# Patient Record
Sex: FEMALE | Race: WHITE | NOT HISPANIC OR LATINO | Employment: FULL TIME | URBAN - METROPOLITAN AREA
[De-identification: names, ages, dates, MRNs, and addresses within clinical notes are randomized per-mention and may not be internally consistent; named-entity substitution may affect disease eponyms.]

---

## 2023-03-16 PROBLEM — R10.12 ABDOMINAL PAIN, LUQ (LEFT UPPER QUADRANT): Status: ACTIVE | Noted: 2023-03-16

## 2023-03-16 PROBLEM — R94.31 ABNORMAL EKG: Status: ACTIVE | Noted: 2023-03-16

## 2023-03-16 PROBLEM — K43.9 ABDOMINAL WALL HERNIA: Status: ACTIVE | Noted: 2023-03-16

## 2023-03-16 PROBLEM — M79.89 SOFT TISSUE MASS: Status: ACTIVE | Noted: 2023-03-16

## 2023-03-16 PROBLEM — R11.0 FEELING QUEASY: Status: ACTIVE | Noted: 2023-03-16

## 2023-03-16 PROBLEM — J02.9 SORE THROAT: Status: ACTIVE | Noted: 2023-03-16

## 2023-03-16 PROBLEM — M25.512 PAIN IN JOINT OF LEFT SHOULDER: Status: ACTIVE | Noted: 2023-03-16

## 2023-03-16 PROBLEM — R10.9 FLANK PAIN, ACUTE: Status: ACTIVE | Noted: 2023-03-16

## 2023-03-16 PROBLEM — R10.11 ABDOMINAL PAIN, RUQ (RIGHT UPPER QUADRANT): Status: ACTIVE | Noted: 2023-03-16

## 2023-03-16 PROBLEM — R39.89 BLADDER PAIN: Status: ACTIVE | Noted: 2023-03-16

## 2023-03-16 PROBLEM — E11.9 DIABETES MELLITUS TYPE 2 WITHOUT RETINOPATHY (MULTI): Status: ACTIVE | Noted: 2023-03-16

## 2023-03-16 PROBLEM — R31.9 HEMATURIA: Status: ACTIVE | Noted: 2023-03-16

## 2023-03-16 PROBLEM — M79.646 THUMB PAIN: Status: ACTIVE | Noted: 2023-03-16

## 2023-03-16 PROBLEM — K76.0 STEATOSIS OF LIVER: Status: ACTIVE | Noted: 2023-03-16

## 2023-03-16 PROBLEM — R91.1 LUNG NODULE SEEN ON IMAGING STUDY: Status: ACTIVE | Noted: 2023-03-16

## 2023-03-16 PROBLEM — M79.602 LEFT ARM PAIN: Status: ACTIVE | Noted: 2023-03-16

## 2023-03-16 PROBLEM — H61.20 IMPACTED CERUMEN: Status: ACTIVE | Noted: 2023-03-16

## 2023-03-16 PROBLEM — J20.9 ACUTE BRONCHITIS: Status: ACTIVE | Noted: 2023-03-16

## 2023-03-16 PROBLEM — R44.8 FACIAL PRESSURE: Status: ACTIVE | Noted: 2023-03-16

## 2023-03-16 PROBLEM — R19.7 DIARRHEA: Status: ACTIVE | Noted: 2023-03-16

## 2023-03-16 PROBLEM — D30.00 RENAL BENIGN NEOPLASM: Status: ACTIVE | Noted: 2023-03-16

## 2023-03-16 PROBLEM — R10.31 ABDOMINAL PAIN, RLQ (RIGHT LOWER QUADRANT): Status: ACTIVE | Noted: 2023-03-16

## 2023-03-16 PROBLEM — R10.32 ABDOMINAL PAIN, LLQ (LEFT LOWER QUADRANT): Status: ACTIVE | Noted: 2023-03-16

## 2023-03-16 PROBLEM — E78.5 HYPERLIPIDEMIA: Status: ACTIVE | Noted: 2023-03-16

## 2023-03-16 PROBLEM — R53.83 FATIGUE: Status: ACTIVE | Noted: 2023-03-16

## 2023-03-16 PROBLEM — H93.11 TINNITUS OF RIGHT EAR: Status: ACTIVE | Noted: 2023-03-16

## 2023-03-16 PROBLEM — H52.00 HYPEROPIA: Status: ACTIVE | Noted: 2023-03-16

## 2023-03-16 PROBLEM — R14.0 ABDOMINAL BLOATING: Status: ACTIVE | Noted: 2023-03-16

## 2023-03-16 PROBLEM — N81.9 VAGINAL VAULT PROLAPSE: Status: ACTIVE | Noted: 2023-03-16

## 2023-03-16 PROBLEM — E11.9 TYPE 2 DIABETES MELLITUS (MULTI): Status: ACTIVE | Noted: 2023-03-16

## 2023-03-16 PROBLEM — K59.00 CONSTIPATION: Status: ACTIVE | Noted: 2023-03-16

## 2023-03-16 PROBLEM — R68.81 EARLY SATIETY: Status: ACTIVE | Noted: 2023-03-16

## 2023-03-16 PROBLEM — J06.9 URI WITH COUGH AND CONGESTION: Status: ACTIVE | Noted: 2023-03-16

## 2023-03-16 PROBLEM — R05.3 CHRONIC COUGH: Status: ACTIVE | Noted: 2023-03-16

## 2023-03-16 PROBLEM — I10 ESSENTIAL HYPERTENSION: Status: ACTIVE | Noted: 2023-03-16

## 2023-03-16 PROBLEM — N13.30 HYDRONEPHROSIS: Status: ACTIVE | Noted: 2023-03-16

## 2023-03-16 PROBLEM — R10.2 FEMALE PELVIC PAIN: Status: ACTIVE | Noted: 2023-03-16

## 2023-03-16 PROBLEM — L03.90 WOUND CELLULITIS: Status: ACTIVE | Noted: 2023-03-16

## 2023-03-16 PROBLEM — R06.02 SHORTNESS OF BREATH: Status: ACTIVE | Noted: 2023-03-16

## 2023-03-16 PROBLEM — R16.0 HEPATOMEGALY: Status: ACTIVE | Noted: 2023-03-16

## 2023-03-16 PROBLEM — R05.9 COUGH: Status: ACTIVE | Noted: 2023-03-16

## 2023-03-16 PROBLEM — N39.0 UTI (URINARY TRACT INFECTION): Status: ACTIVE | Noted: 2023-03-16

## 2023-03-16 PROBLEM — R09.81 NASAL CONGESTION: Status: ACTIVE | Noted: 2023-03-16

## 2023-03-16 PROBLEM — R35.0 URINARY FREQUENCY: Status: ACTIVE | Noted: 2023-03-16

## 2023-03-16 PROBLEM — R11.10 VOMITING IN ADULT: Status: ACTIVE | Noted: 2023-03-16

## 2023-03-16 PROBLEM — N39.3 FEMALE STRESS INCONTINENCE: Status: ACTIVE | Noted: 2023-03-16

## 2023-03-16 PROBLEM — J30.2 SEASONAL ALLERGIES: Status: ACTIVE | Noted: 2023-03-16

## 2023-03-16 PROBLEM — N81.11 CYSTOCELE, MIDLINE: Status: ACTIVE | Noted: 2023-03-16

## 2023-03-16 PROBLEM — H81.10 BENIGN PAROXYSMAL POSITIONAL VERTIGO: Status: ACTIVE | Noted: 2023-03-16

## 2023-03-16 PROBLEM — J34.2 ACQUIRED DEVIATED NASAL SEPTUM: Status: ACTIVE | Noted: 2023-03-16

## 2023-03-16 PROBLEM — G47.33 OBSTRUCTIVE SLEEP APNEA: Status: ACTIVE | Noted: 2023-03-16

## 2023-03-16 PROBLEM — U07.1 COVID-19 VIRUS INFECTION: Status: ACTIVE | Noted: 2023-03-16

## 2023-03-16 PROBLEM — M54.50 LOW BACK PAIN: Status: ACTIVE | Noted: 2023-03-16

## 2023-03-16 PROBLEM — E28.39 HYPOESTROGENISM: Status: ACTIVE | Noted: 2023-03-16

## 2023-03-16 PROBLEM — E55.9 VITAMIN D DEFICIENCY: Status: ACTIVE | Noted: 2023-03-16

## 2023-03-16 PROBLEM — R10.13 ABDOMINAL PAIN, EPIGASTRIC: Status: ACTIVE | Noted: 2023-03-16

## 2023-03-16 PROBLEM — L30.9 ECZEMA: Status: ACTIVE | Noted: 2023-03-16

## 2023-03-16 RX ORDER — BLOOD SUGAR DIAGNOSTIC
STRIP MISCELLANEOUS
COMMUNITY
End: 2023-04-18 | Stop reason: SDUPTHER

## 2023-03-16 RX ORDER — LANCETS
EACH MISCELLANEOUS
COMMUNITY
Start: 2015-09-23 | End: 2024-01-12 | Stop reason: SDUPTHER

## 2023-03-16 RX ORDER — ASPIRIN 81 MG/1
1 TABLET ORAL DAILY
COMMUNITY
Start: 2022-03-09

## 2023-03-16 RX ORDER — AZELASTINE 1 MG/ML
SPRAY, METERED NASAL 2 TIMES DAILY
COMMUNITY
End: 2023-03-31 | Stop reason: SDUPTHER

## 2023-03-16 RX ORDER — LISINOPRIL 20 MG/1
1 TABLET ORAL DAILY
COMMUNITY
Start: 2013-10-31 | End: 2023-05-05 | Stop reason: SDUPTHER

## 2023-03-16 RX ORDER — METFORMIN HYDROCHLORIDE 500 MG/1
1 TABLET, EXTENDED RELEASE ORAL 2 TIMES DAILY
COMMUNITY
Start: 2015-10-21 | End: 2023-06-20 | Stop reason: SDUPTHER

## 2023-03-16 RX ORDER — FAMOTIDINE 20 MG/1
TABLET, FILM COATED ORAL 2 TIMES DAILY
COMMUNITY
Start: 2020-02-26 | End: 2023-05-05 | Stop reason: SDUPTHER

## 2023-03-16 RX ORDER — POTASSIUM CHLORIDE 750 MG/1
1 TABLET, FILM COATED, EXTENDED RELEASE ORAL DAILY
COMMUNITY
Start: 2017-09-22 | End: 2023-05-23 | Stop reason: SDUPTHER

## 2023-03-16 RX ORDER — HYDROCHLOROTHIAZIDE 25 MG/1
1 TABLET ORAL DAILY
COMMUNITY
Start: 2012-11-01 | End: 2023-05-23 | Stop reason: SDUPTHER

## 2023-03-16 RX ORDER — ROSUVASTATIN CALCIUM 5 MG/1
1 TABLET, COATED ORAL NIGHTLY
COMMUNITY
Start: 2018-05-29 | End: 2023-03-31 | Stop reason: SDUPTHER

## 2023-03-24 LAB
ALANINE AMINOTRANSFERASE (SGPT) (U/L) IN SER/PLAS: 49 U/L (ref 7–45)
ALBUMIN (G/DL) IN SER/PLAS: 4.5 G/DL (ref 3.4–5)
ALKALINE PHOSPHATASE (U/L) IN SER/PLAS: 110 U/L (ref 33–136)
ANION GAP IN SER/PLAS: 16 MMOL/L (ref 10–20)
ASPARTATE AMINOTRANSFERASE (SGOT) (U/L) IN SER/PLAS: 41 U/L (ref 9–39)
BASOPHILS (10*3/UL) IN BLOOD BY AUTOMATED COUNT: 0.07 X10E9/L (ref 0–0.1)
BASOPHILS/100 LEUKOCYTES IN BLOOD BY AUTOMATED COUNT: 0.9 % (ref 0–2)
BILIRUBIN TOTAL (MG/DL) IN SER/PLAS: 0.5 MG/DL (ref 0–1.2)
CALCIUM (MG/DL) IN SER/PLAS: 10 MG/DL (ref 8.6–10.3)
CARBON DIOXIDE, TOTAL (MMOL/L) IN SER/PLAS: 27 MMOL/L (ref 21–32)
CHLORIDE (MMOL/L) IN SER/PLAS: 98 MMOL/L (ref 98–107)
CREATININE (MG/DL) IN SER/PLAS: 0.99 MG/DL (ref 0.5–1.05)
EOSINOPHILS (10*3/UL) IN BLOOD BY AUTOMATED COUNT: 0.32 X10E9/L (ref 0–0.7)
EOSINOPHILS/100 LEUKOCYTES IN BLOOD BY AUTOMATED COUNT: 4.1 % (ref 0–6)
ERYTHROCYTE DISTRIBUTION WIDTH (RATIO) BY AUTOMATED COUNT: 13.2 % (ref 11.5–14.5)
ERYTHROCYTE MEAN CORPUSCULAR HEMOGLOBIN CONCENTRATION (G/DL) BY AUTOMATED: 32.3 G/DL (ref 32–36)
ERYTHROCYTE MEAN CORPUSCULAR VOLUME (FL) BY AUTOMATED COUNT: 93 FL (ref 80–100)
ERYTHROCYTES (10*6/UL) IN BLOOD BY AUTOMATED COUNT: 4.55 X10E12/L (ref 4–5.2)
GFR FEMALE: 63 ML/MIN/1.73M2
GLUCOSE (MG/DL) IN SER/PLAS: 107 MG/DL (ref 74–99)
HEMATOCRIT (%) IN BLOOD BY AUTOMATED COUNT: 42.4 % (ref 36–46)
HEMOGLOBIN (G/DL) IN BLOOD: 13.7 G/DL (ref 12–16)
IMMATURE GRANULOCYTES/100 LEUKOCYTES IN BLOOD BY AUTOMATED COUNT: 0.4 % (ref 0–0.9)
LEUKOCYTES (10*3/UL) IN BLOOD BY AUTOMATED COUNT: 7.8 X10E9/L (ref 4.4–11.3)
LYMPHOCYTES (10*3/UL) IN BLOOD BY AUTOMATED COUNT: 2.53 X10E9/L (ref 1.2–4.8)
LYMPHOCYTES/100 LEUKOCYTES IN BLOOD BY AUTOMATED COUNT: 32.4 % (ref 13–44)
MONOCYTES (10*3/UL) IN BLOOD BY AUTOMATED COUNT: 0.7 X10E9/L (ref 0.1–1)
MONOCYTES/100 LEUKOCYTES IN BLOOD BY AUTOMATED COUNT: 9 % (ref 2–10)
NEUTROPHILS (10*3/UL) IN BLOOD BY AUTOMATED COUNT: 4.17 X10E9/L (ref 1.2–7.7)
NEUTROPHILS/100 LEUKOCYTES IN BLOOD BY AUTOMATED COUNT: 53.2 % (ref 40–80)
PLATELETS (10*3/UL) IN BLOOD AUTOMATED COUNT: 245 X10E9/L (ref 150–450)
POTASSIUM (MMOL/L) IN SER/PLAS: 3.8 MMOL/L (ref 3.5–5.3)
PROTEIN TOTAL: 7.7 G/DL (ref 6.4–8.2)
SODIUM (MMOL/L) IN SER/PLAS: 137 MMOL/L (ref 136–145)
UREA NITROGEN (MG/DL) IN SER/PLAS: 18 MG/DL (ref 6–23)

## 2023-03-25 LAB
ESTIMATED AVERAGE GLUCOSE FOR HBA1C: 171 MG/DL
HEMOGLOBIN A1C/HEMOGLOBIN TOTAL IN BLOOD: 7.6 %

## 2023-03-31 ENCOUNTER — OFFICE VISIT (OUTPATIENT)
Dept: PRIMARY CARE | Facility: CLINIC | Age: 68
End: 2023-03-31
Payer: COMMERCIAL

## 2023-03-31 VITALS
HEIGHT: 62 IN | WEIGHT: 148.2 LBS | RESPIRATION RATE: 16 BRPM | BODY MASS INDEX: 27.27 KG/M2 | HEART RATE: 77 BPM | TEMPERATURE: 97.7 F | SYSTOLIC BLOOD PRESSURE: 126 MMHG | OXYGEN SATURATION: 98 % | DIASTOLIC BLOOD PRESSURE: 84 MMHG

## 2023-03-31 DIAGNOSIS — K76.0 STEATOSIS OF LIVER: ICD-10-CM

## 2023-03-31 DIAGNOSIS — E78.2 MIXED HYPERLIPIDEMIA: Primary | ICD-10-CM

## 2023-03-31 DIAGNOSIS — E11.9 DIABETES MELLITUS TYPE 2 WITHOUT RETINOPATHY (MULTI): ICD-10-CM

## 2023-03-31 DIAGNOSIS — R16.0 HEPATOMEGALY: ICD-10-CM

## 2023-03-31 DIAGNOSIS — J30.2 SEASONAL ALLERGIES: ICD-10-CM

## 2023-03-31 DIAGNOSIS — Z00.00 HEALTHCARE MAINTENANCE: ICD-10-CM

## 2023-03-31 DIAGNOSIS — E55.9 VITAMIN D DEFICIENCY: ICD-10-CM

## 2023-03-31 DIAGNOSIS — I10 ESSENTIAL HYPERTENSION: ICD-10-CM

## 2023-03-31 PROCEDURE — 4010F ACE/ARB THERAPY RXD/TAKEN: CPT | Performed by: INTERNAL MEDICINE

## 2023-03-31 PROCEDURE — 99214 OFFICE O/P EST MOD 30 MIN: CPT | Performed by: INTERNAL MEDICINE

## 2023-03-31 PROCEDURE — 1036F TOBACCO NON-USER: CPT | Performed by: INTERNAL MEDICINE

## 2023-03-31 PROCEDURE — 1160F RVW MEDS BY RX/DR IN RCRD: CPT | Performed by: INTERNAL MEDICINE

## 2023-03-31 PROCEDURE — 3051F HG A1C>EQUAL 7.0%<8.0%: CPT | Performed by: INTERNAL MEDICINE

## 2023-03-31 PROCEDURE — 3074F SYST BP LT 130 MM HG: CPT | Performed by: INTERNAL MEDICINE

## 2023-03-31 PROCEDURE — 1159F MED LIST DOCD IN RCRD: CPT | Performed by: INTERNAL MEDICINE

## 2023-03-31 PROCEDURE — 3079F DIAST BP 80-89 MM HG: CPT | Performed by: INTERNAL MEDICINE

## 2023-03-31 RX ORDER — AZELASTINE 1 MG/ML
2 SPRAY, METERED NASAL 2 TIMES DAILY
Qty: 30 ML | Refills: 3 | Status: SHIPPED | OUTPATIENT
Start: 2023-03-31 | End: 2023-03-31

## 2023-03-31 RX ORDER — ROSUVASTATIN CALCIUM 5 MG/1
5 TABLET, COATED ORAL NIGHTLY
Qty: 90 TABLET | Refills: 3 | Status: SHIPPED | OUTPATIENT
Start: 2023-03-31 | End: 2023-03-31

## 2023-03-31 ASSESSMENT — PATIENT HEALTH QUESTIONNAIRE - PHQ9
SUM OF ALL RESPONSES TO PHQ9 QUESTIONS 1 AND 2: 0
1. LITTLE INTEREST OR PLEASURE IN DOING THINGS: NOT AT ALL
2. FEELING DOWN, DEPRESSED OR HOPELESS: NOT AT ALL

## 2023-03-31 ASSESSMENT — ENCOUNTER SYMPTOMS
DEPRESSION: 0
LOSS OF SENSATION IN FEET: 0
OCCASIONAL FEELINGS OF UNSTEADINESS: 0

## 2023-03-31 NOTE — PROGRESS NOTES
Subjective   Patient ID: Pooja Burns is a 67 y.o. female who presents for Follow-up (Patient is here for a follow up./Has no new concerns today. /Was in the ER in Jan and Feb. ).  HPI    Patient has been feeling pretty good and has been complaint with prescribed medications.    We reviewed and discussed details of recent blood work: CBC, CMP, TSH, Lipid panel, Hb A1c, Vit D done in March 2023. Results within acceptable range.  Mildly elevated LFT's noted.  HbA1c: 7.6.  She was contacted by [facility] pharmacy and advised to lower Metformin dose to 500 mg bid.    Patient was evaluated at [facility] ER in Feb 2023 with LLQ abdominal pain, diagnosed with colotis.  I reviewed available hospital records and discharge orders. Discussed hospital course with patient in details. I reviewed discharge medications, reconciled discharge medications and compared with outpatient medication list. All medications changes were discussed with patient in details. Current medication list was updated to reflect recent changes.       She developed skin rash after taking Dicyclomine, went to ER, treated with Benadryl, sx removed.     CT abd/pel showed diffuse steatosis of the liver. And diverticulosis, also possible enterocolitis.     Patient had coloscopy in Oct 2020, few polyps removed, another colonoscopy on Feb 7th 2023, one polyp removed, next colonoscopy advised in 2025.     Review of Systems   Constitutional: Negative.  Negative for activity change, appetite change and unexpected weight change.   HENT: Negative.  Negative for congestion, ear discharge, ear pain, hearing loss, mouth sores, nosebleeds, sinus pressure, sinus pain, sore throat, trouble swallowing and voice change.    Eyes: Negative.  Negative for pain, discharge, redness and visual disturbance.   Respiratory: Negative.  Negative for cough, chest tightness, shortness of breath, wheezing and stridor.    Cardiovascular: Negative.  Negative for chest pain, palpitations and leg  "swelling.   Gastrointestinal: Negative.  Negative for abdominal pain, blood in stool, constipation, diarrhea, nausea and vomiting.   Endocrine: Negative.  Negative for polydipsia, polyphagia and polyuria.   Genitourinary: Negative.  Negative for difficulty urinating, dysuria, flank pain, frequency and urgency.   Musculoskeletal: Negative.  Negative for arthralgias, back pain, gait problem, joint swelling, myalgias, neck pain and neck stiffness.   Skin: Negative.  Negative for color change, rash and wound.   Allergic/Immunologic: Negative.  Negative for environmental allergies, food allergies and immunocompromised state.   Neurological: Negative.  Negative for dizziness, tremors, seizures, syncope, facial asymmetry, speech difficulty, weakness, light-headedness, numbness and headaches.   Hematological: Negative.  Negative for adenopathy. Does not bruise/bleed easily.   Psychiatric/Behavioral: Negative.  Negative for agitation, behavioral problems, confusion, hallucinations, sleep disturbance and suicidal ideas. The patient is not nervous/anxious.    All other systems reviewed and are negative.      Objective     Review of systems was performed and all systems were negative except what in HPI    /84   Pulse 77   Temp 36.5 °C (97.7 °F)   Resp 16   Ht 1.575 m (5' 2\")   Wt 67.2 kg (148 lb 3.2 oz)   SpO2 98%   BMI 27.11 kg/m²    Physical Exam  Vitals and nursing note reviewed.   Constitutional:       General: She is not in acute distress.     Appearance: Normal appearance.   HENT:      Head: Normocephalic and atraumatic.      Right Ear: External ear normal.      Left Ear: External ear normal.      Nose: Nose normal. No congestion or rhinorrhea.   Eyes:      General:         Right eye: No discharge.         Left eye: No discharge.      Extraocular Movements: Extraocular movements intact.      Conjunctiva/sclera: Conjunctivae normal.      Pupils: Pupils are equal, round, and reactive to light.   Cardiovascular: "      Rate and Rhythm: Normal rate and regular rhythm.      Pulses: Normal pulses.      Heart sounds: Normal heart sounds. No murmur heard.     No friction rub. No gallop.   Pulmonary:      Effort: Pulmonary effort is normal. No respiratory distress.      Breath sounds: Normal breath sounds. No stridor. No wheezing, rhonchi or rales.   Chest:      Chest wall: No tenderness.   Abdominal:      General: Bowel sounds are normal.      Palpations: Abdomen is soft. There is no mass.      Tenderness: There is no abdominal tenderness. There is no guarding or rebound.   Musculoskeletal:         General: No swelling or deformity. Normal range of motion.      Cervical back: Normal range of motion and neck supple. No rigidity or tenderness.      Right lower leg: No edema.      Left lower leg: No edema.   Lymphadenopathy:      Cervical: No cervical adenopathy.   Skin:     General: Skin is warm and dry.      Coloration: Skin is not jaundiced.      Findings: No bruising or erythema.   Neurological:      General: No focal deficit present.      Mental Status: She is alert and oriented to person, place, and time. Mental status is at baseline.      Cranial Nerves: No cranial nerve deficit.      Motor: No weakness.      Coordination: Coordination normal.      Gait: Gait normal.   Psychiatric:         Mood and Affect: Mood normal.         Behavior: Behavior normal.         Thought Content: Thought content normal.         Judgment: Judgment normal.         Assessment/Plan   Problem List Items Addressed This Visit          Circulatory    Essential hypertension     Controlled. Continue current medication.             Digestive    Hepatomegaly     Obtain US liver         Steatosis of liver    Relevant Orders    Referral to Hepatology       Endocrine/Metabolic    Diabetes mellitus type 2 without retinopathy (CMS/HCC)     Clinically stable. Continue current medications.  Follow low carb diet         Vitamin D deficiency     Continue Vit D  supplement.            Other    Hyperlipidemia - Primary     Clinically stable. Continue statin.         Relevant Medications    rosuvastatin (Crestor) 5 mg tablet    Seasonal allergies    Relevant Medications    azelastine (Astelin) 137 mcg (0.1 %) nasal spray     Other Visit Diagnoses       Healthcare maintenance        Relevant Orders    CBC    Comprehensive Metabolic Panel    Lipid Panel    TSH with reflex to Free T4 if abnormal    Vitamin D, Total    Hemoglobin A1C    Albumin , Urine Random        It was a pleasure to see you today.  I would like to remind you about importance of a healthy lifestyle in order to improve your well-being and live longer.  Try to engage in physical activities for at least 150 minutes per week.  Eat about 10 servings of fruits and vegetables daily. My advice is 2 servings of fruits and 8 servings of vegetables.  For vegetables choose at least half of them green and at least half of them fresh.  Please avoid sugar, salt, fried food and saturated fat.    F/up in 3 months or sooner if needed.

## 2023-04-01 PROBLEM — I10 ESSENTIAL (PRIMARY) HYPERTENSION: Status: ACTIVE | Noted: 2023-04-01

## 2023-04-01 ASSESSMENT — ENCOUNTER SYMPTOMS
VOMITING: 0
EYE PAIN: 0
ALLERGIC/IMMUNOLOGIC NEGATIVE: 1
SLEEP DISTURBANCE: 0
WEAKNESS: 0
STRIDOR: 0
DIARRHEA: 0
CONFUSION: 0
CONSTIPATION: 0
SINUS PRESSURE: 0
ACTIVITY CHANGE: 0
DYSURIA: 0
APPETITE CHANGE: 0
FREQUENCY: 0
POLYPHAGIA: 0
EYES NEGATIVE: 1
SORE THROAT: 0
PSYCHIATRIC NEGATIVE: 1
HALLUCINATIONS: 0
ABDOMINAL PAIN: 0
NEUROLOGICAL NEGATIVE: 1
ARTHRALGIAS: 0
RESPIRATORY NEGATIVE: 1
NECK STIFFNESS: 0
COLOR CHANGE: 0
HEADACHES: 0
DIFFICULTY URINATING: 0
FLANK PAIN: 0
TREMORS: 0
POLYDIPSIA: 0
JOINT SWELLING: 0
NUMBNESS: 0
NERVOUS/ANXIOUS: 0
SPEECH DIFFICULTY: 0
SINUS PAIN: 0
SHORTNESS OF BREATH: 0
BLOOD IN STOOL: 0
AGITATION: 0
BACK PAIN: 0
LIGHT-HEADEDNESS: 0
WHEEZING: 0
TROUBLE SWALLOWING: 0
UNEXPECTED WEIGHT CHANGE: 0
HEMATOLOGIC/LYMPHATIC NEGATIVE: 1
EYE DISCHARGE: 0
CONSTITUTIONAL NEGATIVE: 1
NECK PAIN: 0
WOUND: 0
ADENOPATHY: 0
BRUISES/BLEEDS EASILY: 0
COUGH: 0
ENDOCRINE NEGATIVE: 1
PALPITATIONS: 0
CHEST TIGHTNESS: 0
CARDIOVASCULAR NEGATIVE: 1
EYE REDNESS: 0
SEIZURES: 0
MYALGIAS: 0
FACIAL ASYMMETRY: 0
VOICE CHANGE: 0
DIZZINESS: 0
GASTROINTESTINAL NEGATIVE: 1
MUSCULOSKELETAL NEGATIVE: 1
NAUSEA: 0

## 2023-04-18 DIAGNOSIS — E11.9 TYPE 2 DIABETES MELLITUS WITHOUT COMPLICATION, WITHOUT LONG-TERM CURRENT USE OF INSULIN (MULTI): Primary | ICD-10-CM

## 2023-04-18 RX ORDER — BLOOD SUGAR DIAGNOSTIC
1 STRIP MISCELLANEOUS DAILY
Qty: 100 STRIP | Refills: 3 | Status: SHIPPED | OUTPATIENT
Start: 2023-04-18 | End: 2023-04-18

## 2023-05-05 DIAGNOSIS — I10 ESSENTIAL (PRIMARY) HYPERTENSION: Primary | ICD-10-CM

## 2023-05-05 DIAGNOSIS — R10.13 ABDOMINAL PAIN, EPIGASTRIC: ICD-10-CM

## 2023-05-05 RX ORDER — LISINOPRIL 20 MG/1
20 TABLET ORAL DAILY
Qty: 30 TABLET | Refills: 2 | Status: SHIPPED | OUTPATIENT
Start: 2023-05-05 | End: 2023-05-12 | Stop reason: ALTCHOICE

## 2023-05-05 RX ORDER — FAMOTIDINE 20 MG/1
20 TABLET, FILM COATED ORAL 2 TIMES DAILY PRN
Qty: 30 TABLET | Refills: 3 | Status: SHIPPED | OUTPATIENT
Start: 2023-05-05 | End: 2023-06-30 | Stop reason: SDUPTHER

## 2023-05-12 DIAGNOSIS — I10 ESSENTIAL (PRIMARY) HYPERTENSION: ICD-10-CM

## 2023-05-12 RX ORDER — LISINOPRIL 5 MG/1
5 TABLET ORAL DAILY
Qty: 30 TABLET | Refills: 0 | Status: SHIPPED | OUTPATIENT
Start: 2023-05-12 | End: 2023-06-13 | Stop reason: SDUPTHER

## 2023-05-12 RX ORDER — LISINOPRIL 5 MG/1
5 TABLET ORAL DAILY
COMMUNITY
End: 2023-05-12 | Stop reason: SDUPTHER

## 2023-05-12 NOTE — TELEPHONE ENCOUNTER
Patient states she picked up her lisinopril and it was 20 mg, she states previously was taking only 5 mg.

## 2023-05-23 DIAGNOSIS — I10 ESSENTIAL HYPERTENSION: Primary | ICD-10-CM

## 2023-05-23 RX ORDER — POTASSIUM CHLORIDE 750 MG/1
10 TABLET, FILM COATED, EXTENDED RELEASE ORAL DAILY
Qty: 90 TABLET | Refills: 3 | Status: SHIPPED | OUTPATIENT
Start: 2023-05-23 | End: 2023-05-23

## 2023-05-23 RX ORDER — HYDROCHLOROTHIAZIDE 25 MG/1
25 TABLET ORAL DAILY
Qty: 90 TABLET | Refills: 3 | Status: SHIPPED | OUTPATIENT
Start: 2023-05-23 | End: 2023-05-23

## 2023-06-13 DIAGNOSIS — I10 ESSENTIAL (PRIMARY) HYPERTENSION: ICD-10-CM

## 2023-06-13 RX ORDER — LISINOPRIL 5 MG/1
5 TABLET ORAL DAILY
Qty: 90 TABLET | Refills: 3 | Status: SHIPPED | OUTPATIENT
Start: 2023-06-13 | End: 2023-06-13

## 2023-06-20 DIAGNOSIS — E11.9 DIABETES MELLITUS TYPE 2 WITHOUT RETINOPATHY (MULTI): Primary | ICD-10-CM

## 2023-06-20 RX ORDER — METFORMIN HYDROCHLORIDE 500 MG/1
500 TABLET, EXTENDED RELEASE ORAL 2 TIMES DAILY
Qty: 180 TABLET | Refills: 3 | Status: SHIPPED | OUTPATIENT
Start: 2023-06-20 | End: 2023-06-20

## 2023-06-30 DIAGNOSIS — R10.13 ABDOMINAL PAIN, EPIGASTRIC: ICD-10-CM

## 2023-06-30 RX ORDER — FAMOTIDINE 20 MG/1
20 TABLET, FILM COATED ORAL 2 TIMES DAILY
Qty: 180 TABLET | Refills: 3 | Status: SHIPPED | OUTPATIENT
Start: 2023-06-30 | End: 2023-06-30

## 2023-07-08 ENCOUNTER — LAB (OUTPATIENT)
Dept: LAB | Facility: LAB | Age: 68
End: 2023-07-08
Payer: COMMERCIAL

## 2023-07-08 DIAGNOSIS — Z00.00 HEALTHCARE MAINTENANCE: ICD-10-CM

## 2023-07-08 LAB
ALANINE AMINOTRANSFERASE (SGPT) (U/L) IN SER/PLAS: 43 U/L (ref 7–45)
ALBUMIN (G/DL) IN SER/PLAS: 4.4 G/DL (ref 3.4–5)
ALKALINE PHOSPHATASE (U/L) IN SER/PLAS: 123 U/L (ref 33–136)
ANION GAP IN SER/PLAS: 13 MMOL/L (ref 10–20)
ASPARTATE AMINOTRANSFERASE (SGOT) (U/L) IN SER/PLAS: 30 U/L (ref 9–39)
BILIRUBIN TOTAL (MG/DL) IN SER/PLAS: 0.5 MG/DL (ref 0–1.2)
CALCIDIOL (25 OH VITAMIN D3) (NG/ML) IN SER/PLAS: 25 NG/ML
CALCIUM (MG/DL) IN SER/PLAS: 9.8 MG/DL (ref 8.6–10.3)
CARBON DIOXIDE, TOTAL (MMOL/L) IN SER/PLAS: 26 MMOL/L (ref 21–32)
CHLORIDE (MMOL/L) IN SER/PLAS: 101 MMOL/L (ref 98–107)
CHOLESTEROL (MG/DL) IN SER/PLAS: 187 MG/DL (ref 0–199)
CHOLESTEROL IN HDL (MG/DL) IN SER/PLAS: 50.2 MG/DL
CHOLESTEROL/HDL RATIO: 3.7
CREATININE (MG/DL) IN SER/PLAS: 1.13 MG/DL (ref 0.5–1.05)
ERYTHROCYTE DISTRIBUTION WIDTH (RATIO) BY AUTOMATED COUNT: 12.6 % (ref 11.5–14.5)
ERYTHROCYTE MEAN CORPUSCULAR HEMOGLOBIN CONCENTRATION (G/DL) BY AUTOMATED: 33.7 G/DL (ref 32–36)
ERYTHROCYTE MEAN CORPUSCULAR VOLUME (FL) BY AUTOMATED COUNT: 91 FL (ref 80–100)
ERYTHROCYTES (10*6/UL) IN BLOOD BY AUTOMATED COUNT: 4.62 X10E12/L (ref 4–5.2)
ESTIMATED AVERAGE GLUCOSE FOR HBA1C: 197 MG/DL
GFR FEMALE: 53 ML/MIN/1.73M2
GLUCOSE (MG/DL) IN SER/PLAS: 157 MG/DL (ref 74–99)
HEMATOCRIT (%) IN BLOOD BY AUTOMATED COUNT: 41.9 % (ref 36–46)
HEMOGLOBIN (G/DL) IN BLOOD: 14.1 G/DL (ref 12–16)
HEMOGLOBIN A1C/HEMOGLOBIN TOTAL IN BLOOD: 8.5 %
LDL: 120 MG/DL (ref 0–99)
LEUKOCYTES (10*3/UL) IN BLOOD BY AUTOMATED COUNT: 8 X10E9/L (ref 4.4–11.3)
PLATELETS (10*3/UL) IN BLOOD AUTOMATED COUNT: 255 X10E9/L (ref 150–450)
POTASSIUM (MMOL/L) IN SER/PLAS: 4.2 MMOL/L (ref 3.5–5.3)
PROTEIN TOTAL: 7.6 G/DL (ref 6.4–8.2)
SODIUM (MMOL/L) IN SER/PLAS: 136 MMOL/L (ref 136–145)
THYROTROPIN (MIU/L) IN SER/PLAS BY DETECTION LIMIT <= 0.05 MIU/L: 1.81 MIU/L (ref 0.44–3.98)
TRIGLYCERIDE (MG/DL) IN SER/PLAS: 86 MG/DL (ref 0–149)
UREA NITROGEN (MG/DL) IN SER/PLAS: 20 MG/DL (ref 6–23)
VLDL: 17 MG/DL (ref 0–40)

## 2023-07-08 PROCEDURE — 83036 HEMOGLOBIN GLYCOSYLATED A1C: CPT

## 2023-07-08 PROCEDURE — 36415 COLL VENOUS BLD VENIPUNCTURE: CPT

## 2023-07-08 PROCEDURE — 85027 COMPLETE CBC AUTOMATED: CPT

## 2023-07-08 PROCEDURE — 80053 COMPREHEN METABOLIC PANEL: CPT

## 2023-07-08 PROCEDURE — 80061 LIPID PANEL: CPT

## 2023-07-08 PROCEDURE — 82306 VITAMIN D 25 HYDROXY: CPT

## 2023-07-08 PROCEDURE — 84443 ASSAY THYROID STIM HORMONE: CPT

## 2023-07-17 ENCOUNTER — APPOINTMENT (OUTPATIENT)
Dept: PRIMARY CARE | Facility: CLINIC | Age: 68
End: 2023-07-17
Payer: COMMERCIAL

## 2023-07-27 ENCOUNTER — OFFICE VISIT (OUTPATIENT)
Dept: PRIMARY CARE | Facility: CLINIC | Age: 68
End: 2023-07-27
Payer: COMMERCIAL

## 2023-07-27 VITALS
RESPIRATION RATE: 14 BRPM | DIASTOLIC BLOOD PRESSURE: 79 MMHG | HEIGHT: 62 IN | WEIGHT: 152.4 LBS | TEMPERATURE: 97.3 F | OXYGEN SATURATION: 98 % | SYSTOLIC BLOOD PRESSURE: 121 MMHG | HEART RATE: 75 BPM | BODY MASS INDEX: 28.05 KG/M2

## 2023-07-27 DIAGNOSIS — R06.09 DYSPNEA ON EXERTION: ICD-10-CM

## 2023-07-27 DIAGNOSIS — E11.9 TYPE 2 DIABETES MELLITUS WITHOUT COMPLICATION, WITHOUT LONG-TERM CURRENT USE OF INSULIN (MULTI): Primary | ICD-10-CM

## 2023-07-27 DIAGNOSIS — R94.31 ABNORMAL EKG: ICD-10-CM

## 2023-07-27 DIAGNOSIS — E78.2 MIXED HYPERLIPIDEMIA: ICD-10-CM

## 2023-07-27 DIAGNOSIS — I10 ESSENTIAL (PRIMARY) HYPERTENSION: ICD-10-CM

## 2023-07-27 DIAGNOSIS — K76.0 STEATOSIS OF LIVER: ICD-10-CM

## 2023-07-27 PROCEDURE — 1159F MED LIST DOCD IN RCRD: CPT | Performed by: INTERNAL MEDICINE

## 2023-07-27 PROCEDURE — 4010F ACE/ARB THERAPY RXD/TAKEN: CPT | Performed by: INTERNAL MEDICINE

## 2023-07-27 PROCEDURE — 1036F TOBACCO NON-USER: CPT | Performed by: INTERNAL MEDICINE

## 2023-07-27 PROCEDURE — 3052F HG A1C>EQUAL 8.0%<EQUAL 9.0%: CPT | Performed by: INTERNAL MEDICINE

## 2023-07-27 PROCEDURE — 3074F SYST BP LT 130 MM HG: CPT | Performed by: INTERNAL MEDICINE

## 2023-07-27 PROCEDURE — 99215 OFFICE O/P EST HI 40 MIN: CPT | Performed by: INTERNAL MEDICINE

## 2023-07-27 PROCEDURE — 1160F RVW MEDS BY RX/DR IN RCRD: CPT | Performed by: INTERNAL MEDICINE

## 2023-07-27 PROCEDURE — 3078F DIAST BP <80 MM HG: CPT | Performed by: INTERNAL MEDICINE

## 2023-07-27 ASSESSMENT — ENCOUNTER SYMPTOMS
BACK PAIN: 0
JOINT SWELLING: 0
POLYDIPSIA: 0
CONSTIPATION: 0
LIGHT-HEADEDNESS: 0
TROUBLE SWALLOWING: 0
EYE PAIN: 0
POLYPHAGIA: 0
PALPITATIONS: 0
VOMITING: 0
ABDOMINAL PAIN: 0
ADENOPATHY: 0
ALLERGIC/IMMUNOLOGIC NEGATIVE: 1
FREQUENCY: 0
NERVOUS/ANXIOUS: 0
NECK STIFFNESS: 0
DIARRHEA: 0
COUGH: 0
CONFUSION: 0
FACIAL ASYMMETRY: 0
ENDOCRINE NEGATIVE: 1
HEMATOLOGIC/LYMPHATIC NEGATIVE: 1
EYE REDNESS: 0
OCCASIONAL FEELINGS OF UNSTEADINESS: 0
WHEEZING: 0
CARDIOVASCULAR NEGATIVE: 1
NUMBNESS: 0
HEADACHES: 0
SHORTNESS OF BREATH: 1
SORE THROAT: 0
FLANK PAIN: 0
COLOR CHANGE: 0
DIFFICULTY URINATING: 0
NECK PAIN: 0
SLEEP DISTURBANCE: 0
DEPRESSION: 0
HALLUCINATIONS: 0
SPEECH DIFFICULTY: 0
FATIGUE: 1
DIZZINESS: 0
PSYCHIATRIC NEGATIVE: 1
TREMORS: 0
DYSURIA: 0
SINUS PRESSURE: 0
ACTIVITY CHANGE: 0
NEUROLOGICAL NEGATIVE: 1
EYE DISCHARGE: 0
MUSCULOSKELETAL NEGATIVE: 1
APPETITE CHANGE: 0
ARTHRALGIAS: 0
SINUS PAIN: 0
AGITATION: 0
NAUSEA: 0
EYES NEGATIVE: 1
VOICE CHANGE: 0
GASTROINTESTINAL NEGATIVE: 1
MYALGIAS: 0
LOSS OF SENSATION IN FEET: 0
BRUISES/BLEEDS EASILY: 0
STRIDOR: 0
WOUND: 0
BLOOD IN STOOL: 0
CHEST TIGHTNESS: 0
WEAKNESS: 0
UNEXPECTED WEIGHT CHANGE: 0
SEIZURES: 0

## 2023-07-27 ASSESSMENT — PATIENT HEALTH QUESTIONNAIRE - PHQ9
2. FEELING DOWN, DEPRESSED OR HOPELESS: NOT AT ALL
1. LITTLE INTEREST OR PLEASURE IN DOING THINGS: NOT AT ALL
SUM OF ALL RESPONSES TO PHQ9 QUESTIONS 1 AND 2: 0

## 2023-07-27 NOTE — PROGRESS NOTES
Subjective   Patient ID: Pooja Burns is a 68 y.o. female who presents for Follow-up (Patient is here for a follow up./No new concerns. ).  HPI    Patient has been feeling pretty good and has been complaint with prescribed medications.  C/o SOB with exertion previous EKG  (Feb 2023) showed abnormalities suggestive of inferior and anterolateral infarct, CT CAC score in 2022 was 87.  Due to DM she is considered high CVD rsk.  Will obtain perfusion stress test for further evaluation.    We reviewed and discussed details of recent blood work: CBC, CMP, TSH, Lipid panel, Hb A1c.  Results within acceptable range except elevated HbA1c: 8.5.  Will add Jardiance.  Due to elevated CVD risk and DM we discussed treatment with SGLT-2i with proven CV benefit.   Possible side effects discussed (renal impairment, genital/urinary infections, hypoglycemia, etc.)      Review of Systems   Constitutional:  Positive for fatigue. Negative for activity change, appetite change and unexpected weight change.   HENT: Negative.  Negative for congestion, ear discharge, ear pain, hearing loss, mouth sores, nosebleeds, sinus pressure, sinus pain, sore throat, trouble swallowing and voice change.    Eyes: Negative.  Negative for pain, discharge, redness and visual disturbance.   Respiratory:  Positive for shortness of breath. Negative for cough, chest tightness, wheezing and stridor.    Cardiovascular: Negative.  Negative for chest pain, palpitations and leg swelling.   Gastrointestinal: Negative.  Negative for abdominal pain, blood in stool, constipation, diarrhea, nausea and vomiting.   Endocrine: Negative.  Negative for polydipsia, polyphagia and polyuria.   Genitourinary: Negative.  Negative for difficulty urinating, dysuria, flank pain, frequency and urgency.   Musculoskeletal: Negative.  Negative for arthralgias, back pain, gait problem, joint swelling, myalgias, neck pain and neck stiffness.   Skin: Negative.  Negative for color change,  "rash and wound.   Allergic/Immunologic: Negative.  Negative for environmental allergies, food allergies and immunocompromised state.   Neurological: Negative.  Negative for dizziness, tremors, seizures, syncope, facial asymmetry, speech difficulty, weakness, light-headedness, numbness and headaches.   Hematological: Negative.  Negative for adenopathy. Does not bruise/bleed easily.   Psychiatric/Behavioral: Negative.  Negative for agitation, behavioral problems, confusion, hallucinations, sleep disturbance and suicidal ideas. The patient is not nervous/anxious.    All other systems reviewed and are negative.      Objective     Review of systems was performed and all systems were negative except what in HPI    /79   Pulse 75   Temp 36.3 °C (97.3 °F)   Resp 14   Ht 1.575 m (5' 2\")   Wt 69.1 kg (152 lb 6.4 oz)   SpO2 98%   BMI 27.87 kg/m²    Physical Exam  Vitals and nursing note reviewed.   Constitutional:       General: She is not in acute distress.     Appearance: Normal appearance.   HENT:      Head: Normocephalic and atraumatic.      Right Ear: External ear normal.      Left Ear: External ear normal.      Nose: Nose normal. No congestion or rhinorrhea.   Eyes:      General:         Right eye: No discharge.         Left eye: No discharge.      Extraocular Movements: Extraocular movements intact.      Conjunctiva/sclera: Conjunctivae normal.      Pupils: Pupils are equal, round, and reactive to light.   Cardiovascular:      Rate and Rhythm: Normal rate and regular rhythm.      Pulses: Normal pulses.      Heart sounds: Normal heart sounds. No murmur heard.     No friction rub. No gallop.   Pulmonary:      Effort: Pulmonary effort is normal. No respiratory distress.      Breath sounds: Normal breath sounds. No stridor. No wheezing, rhonchi or rales.   Chest:      Chest wall: No tenderness.   Abdominal:      General: Bowel sounds are normal.      Palpations: Abdomen is soft. There is no mass.      " Tenderness: There is no abdominal tenderness. There is no guarding or rebound.   Musculoskeletal:         General: No swelling or deformity. Normal range of motion.      Cervical back: Normal range of motion and neck supple. No rigidity or tenderness.      Right lower leg: No edema.      Left lower leg: No edema.   Lymphadenopathy:      Cervical: No cervical adenopathy.   Skin:     General: Skin is warm and dry.      Coloration: Skin is not jaundiced.      Findings: No bruising or erythema.   Neurological:      General: No focal deficit present.      Mental Status: She is alert and oriented to person, place, and time. Mental status is at baseline.      Cranial Nerves: No cranial nerve deficit.      Motor: No weakness.      Coordination: Coordination normal.      Gait: Gait normal.   Psychiatric:         Mood and Affect: Mood normal.         Behavior: Behavior normal.         Thought Content: Thought content normal.         Judgment: Judgment normal.         Assessment/Plan   Problem List Items Addressed This Visit          Cardiac and Vasculature    Abnormal EKG     Please schedule stress test.          Relevant Orders    Nuclear Stress Test    Hyperlipidemia     Clinically stable. Continue statin.         Dyspnea on exertion     Please arrange stress test.  Go to ER if symptoms worsen.         Relevant Orders    Nuclear Stress Test    RESOLVED: Essential (primary) hypertension       Endocrine/Metabolic    Type 2 diabetes mellitus (CMS/HCC) - Primary     Continue Metformin, add Jardiance 10 mg daily.          Relevant Medications    empagliflozin (Jardiance) 10 mg    Other Relevant Orders    Nuclear Stress Test       Gastrointestinal and Abdominal    Steatosis of liver     Weight loss is advised. Target BMI: 25. Please follow low carbohydrate diet and exercise at least 150 minutes weekly.  Nutritional consultation is available, please let me know if interested.         It was a pleasure to see you today.  I would  like to remind you about importance of a healthy lifestyle in order to improve your well-being and live longer.  Try to engage in physical activities for at least 150 minutes per week.  Eat about 10 servings of fruits and vegetables daily. My advice is 2 servings of fruits and 8 servings of vegetables.  For vegetables choose at least half of them green and at least half of them fresh.  Please avoid sugar, salt, fried food and saturated fat.    I spent a total of 42 minutes on the date of service for follow up visit, which included preparing to see the patient, face-to-face patient care, completing clinical documentation, obtaining and/or reviewing separately obtained history, performing a medically appropriate examination, counseling and educating the patient/family/caregiver, ordering medications, tests, or procedures, communicating with other health care providers (not separately reported), independently interpreting results (not separately reported), communicating results to the patient/family/caregiver, and care coordination (not separately reported).      F/up in 1 month or sooner if needed.

## 2023-09-27 ENCOUNTER — OFFICE VISIT (OUTPATIENT)
Dept: PRIMARY CARE | Facility: CLINIC | Age: 68
End: 2023-09-27
Payer: COMMERCIAL

## 2023-09-27 VITALS
BODY MASS INDEX: 27.79 KG/M2 | OXYGEN SATURATION: 98 % | DIASTOLIC BLOOD PRESSURE: 80 MMHG | WEIGHT: 151 LBS | HEIGHT: 62 IN | HEART RATE: 70 BPM | SYSTOLIC BLOOD PRESSURE: 114 MMHG | TEMPERATURE: 96.8 F

## 2023-09-27 DIAGNOSIS — E11.9 TYPE 2 DIABETES MELLITUS WITHOUT COMPLICATION, WITHOUT LONG-TERM CURRENT USE OF INSULIN (MULTI): Primary | ICD-10-CM

## 2023-09-27 DIAGNOSIS — I10 ESSENTIAL HYPERTENSION: ICD-10-CM

## 2023-09-27 DIAGNOSIS — K76.0 STEATOSIS OF LIVER: ICD-10-CM

## 2023-09-27 DIAGNOSIS — E55.9 VITAMIN D DEFICIENCY: ICD-10-CM

## 2023-09-27 DIAGNOSIS — R06.09 DYSPNEA ON EXERTION: ICD-10-CM

## 2023-09-27 DIAGNOSIS — E78.2 MIXED HYPERLIPIDEMIA: ICD-10-CM

## 2023-09-27 LAB
POC FINGERSTICK BLOOD GLUCOSE: 290 MG/DL (ref 70–100)
POC HEMOGLOBIN A1C: 7.9 % (ref 4.2–6.5)

## 2023-09-27 PROCEDURE — 83036 HEMOGLOBIN GLYCOSYLATED A1C: CPT | Performed by: INTERNAL MEDICINE

## 2023-09-27 PROCEDURE — 3052F HG A1C>EQUAL 8.0%<EQUAL 9.0%: CPT | Performed by: INTERNAL MEDICINE

## 2023-09-27 PROCEDURE — 4010F ACE/ARB THERAPY RXD/TAKEN: CPT | Performed by: INTERNAL MEDICINE

## 2023-09-27 PROCEDURE — 3074F SYST BP LT 130 MM HG: CPT | Performed by: INTERNAL MEDICINE

## 2023-09-27 PROCEDURE — 1159F MED LIST DOCD IN RCRD: CPT | Performed by: INTERNAL MEDICINE

## 2023-09-27 PROCEDURE — 90471 IMMUNIZATION ADMIN: CPT | Performed by: INTERNAL MEDICINE

## 2023-09-27 PROCEDURE — 3079F DIAST BP 80-89 MM HG: CPT | Performed by: INTERNAL MEDICINE

## 2023-09-27 PROCEDURE — 99214 OFFICE O/P EST MOD 30 MIN: CPT | Performed by: INTERNAL MEDICINE

## 2023-09-27 PROCEDURE — 90662 IIV NO PRSV INCREASED AG IM: CPT | Performed by: INTERNAL MEDICINE

## 2023-09-27 PROCEDURE — 1160F RVW MEDS BY RX/DR IN RCRD: CPT | Performed by: INTERNAL MEDICINE

## 2023-09-27 PROCEDURE — 82962 GLUCOSE BLOOD TEST: CPT | Performed by: INTERNAL MEDICINE

## 2023-09-27 PROCEDURE — 1036F TOBACCO NON-USER: CPT | Performed by: INTERNAL MEDICINE

## 2023-09-27 ASSESSMENT — ENCOUNTER SYMPTOMS
FACIAL ASYMMETRY: 0
STRIDOR: 0
POLYDIPSIA: 0
MYALGIAS: 0
CARDIOVASCULAR NEGATIVE: 1
CHEST TIGHTNESS: 0
APPETITE CHANGE: 0
COLOR CHANGE: 0
ENDOCRINE NEGATIVE: 1
DIARRHEA: 0
CONFUSION: 0
NEUROLOGICAL NEGATIVE: 1
ALLERGIC/IMMUNOLOGIC NEGATIVE: 1
CONSTIPATION: 0
GASTROINTESTINAL NEGATIVE: 1
AGITATION: 0
ACTIVITY CHANGE: 0
SORE THROAT: 0
JOINT SWELLING: 0
NECK PAIN: 0
DIZZINESS: 0
LIGHT-HEADEDNESS: 0
ADENOPATHY: 0
EYES NEGATIVE: 1
POLYPHAGIA: 0
BRUISES/BLEEDS EASILY: 0
NERVOUS/ANXIOUS: 0
RESPIRATORY NEGATIVE: 1
MUSCULOSKELETAL NEGATIVE: 1
SHORTNESS OF BREATH: 0
WHEEZING: 0
ARTHRALGIAS: 0
HEADACHES: 0
ABDOMINAL PAIN: 0
CONSTITUTIONAL NEGATIVE: 1
HEMATOLOGIC/LYMPHATIC NEGATIVE: 1
TREMORS: 0
SLEEP DISTURBANCE: 0
TROUBLE SWALLOWING: 0
DIFFICULTY URINATING: 0
EYE REDNESS: 0
VOICE CHANGE: 0
NECK STIFFNESS: 0
FREQUENCY: 0
NUMBNESS: 0
EYE DISCHARGE: 0
HALLUCINATIONS: 0
PALPITATIONS: 0
BLOOD IN STOOL: 0
COUGH: 0
SINUS PRESSURE: 0
DYSURIA: 0
NAUSEA: 0
FLANK PAIN: 0
SPEECH DIFFICULTY: 0
WEAKNESS: 0
UNEXPECTED WEIGHT CHANGE: 0
SEIZURES: 0
SINUS PAIN: 0
EYE PAIN: 0
PSYCHIATRIC NEGATIVE: 1
WOUND: 0
VOMITING: 0
BACK PAIN: 0

## 2023-09-27 ASSESSMENT — PATIENT HEALTH QUESTIONNAIRE - PHQ9
2. FEELING DOWN, DEPRESSED OR HOPELESS: NOT AT ALL
SUM OF ALL RESPONSES TO PHQ9 QUESTIONS 1 AND 2: 0
1. LITTLE INTEREST OR PLEASURE IN DOING THINGS: NOT AT ALL

## 2023-09-27 NOTE — PROGRESS NOTES
Subjective   Patient ID: Pooja Burns is a 68 y.o. female who presents for Follow-up.  HPI  Patient has been feeling pretty good and has been complaint with prescribed medications.    She underwent perfusion stress test in July 2023 which was normal.  We discussed results in details.  She gets SOB with exertion, most likely due to poor exercise tolerance and sedentary lifestyle.  I advised to start exercise program and gradually increase level of exertion. Advised to diony heart rate to at least 120 bpm when exercising.       We reviewed and discussed details of recent blood work: CBC, CMP, TSH, Lipid panel, Hb A1c done in July 2023.  Results within acceptable range except elevated HbA1c: 8.4.  We added Jardiance, she was not feeling well, urinating a lot, lightheaded, discontinued after 1 week.   Today's HbA1c: 7.9.  Due to losing hearing in right ear saw Dr. Dixon, had MRI done which was unremarkable except chronic ischemic changes.    Review of Systems   Constitutional: Negative.  Negative for activity change, appetite change and unexpected weight change.   HENT: Negative.  Negative for congestion, ear discharge, ear pain, hearing loss, mouth sores, nosebleeds, sinus pressure, sinus pain, sore throat, trouble swallowing and voice change.    Eyes: Negative.  Negative for pain, discharge, redness and visual disturbance.   Respiratory: Negative.  Negative for cough, chest tightness, shortness of breath, wheezing and stridor.    Cardiovascular: Negative.  Negative for chest pain, palpitations and leg swelling.   Gastrointestinal: Negative.  Negative for abdominal pain, blood in stool, constipation, diarrhea, nausea and vomiting.   Endocrine: Negative.  Negative for polydipsia, polyphagia and polyuria.   Genitourinary: Negative.  Negative for difficulty urinating, dysuria, flank pain, frequency and urgency.   Musculoskeletal: Negative.  Negative for arthralgias, back pain, gait problem, joint swelling, myalgias, neck  "pain and neck stiffness.   Skin: Negative.  Negative for color change, rash and wound.   Allergic/Immunologic: Negative.  Negative for environmental allergies, food allergies and immunocompromised state.   Neurological: Negative.  Negative for dizziness, tremors, seizures, syncope, facial asymmetry, speech difficulty, weakness, light-headedness, numbness and headaches.   Hematological: Negative.  Negative for adenopathy. Does not bruise/bleed easily.   Psychiatric/Behavioral: Negative.  Negative for agitation, behavioral problems, confusion, hallucinations, sleep disturbance and suicidal ideas. The patient is not nervous/anxious.    All other systems reviewed and are negative.      Objective     Review of systems was performed and all systems were negative except what in HPI    /80   Pulse 70   Temp 36 °C (96.8 °F) (Temporal)   Ht 1.575 m (5' 2\")   Wt 68.5 kg (151 lb)   SpO2 98%   BMI 27.62 kg/m²    Physical Exam  Vitals and nursing note reviewed.   Constitutional:       General: She is not in acute distress.     Appearance: Normal appearance.   HENT:      Head: Normocephalic and atraumatic.      Right Ear: External ear normal.      Left Ear: External ear normal.      Nose: Nose normal. No congestion or rhinorrhea.   Eyes:      General:         Right eye: No discharge.         Left eye: No discharge.      Extraocular Movements: Extraocular movements intact.      Conjunctiva/sclera: Conjunctivae normal.      Pupils: Pupils are equal, round, and reactive to light.   Cardiovascular:      Rate and Rhythm: Normal rate and regular rhythm.      Pulses: Normal pulses.      Heart sounds: Normal heart sounds. No murmur heard.     No friction rub. No gallop.   Pulmonary:      Effort: Pulmonary effort is normal. No respiratory distress.      Breath sounds: Normal breath sounds. No stridor. No wheezing, rhonchi or rales.   Chest:      Chest wall: No tenderness.   Abdominal:      General: Bowel sounds are normal.      " Palpations: Abdomen is soft. There is no mass.      Tenderness: There is no abdominal tenderness. There is no guarding or rebound.   Musculoskeletal:         General: No swelling or deformity. Normal range of motion.      Cervical back: Normal range of motion and neck supple. No rigidity or tenderness.      Right lower leg: No edema.      Left lower leg: No edema.   Lymphadenopathy:      Cervical: No cervical adenopathy.   Skin:     General: Skin is warm and dry.      Coloration: Skin is not jaundiced.      Findings: No bruising or erythema.   Neurological:      General: No focal deficit present.      Mental Status: She is alert and oriented to person, place, and time. Mental status is at baseline.      Cranial Nerves: No cranial nerve deficit.      Motor: No weakness.      Coordination: Coordination normal.      Gait: Gait normal.   Psychiatric:         Mood and Affect: Mood normal.         Behavior: Behavior normal.         Thought Content: Thought content normal.         Judgment: Judgment normal.         Assessment/Plan   Problem List Items Addressed This Visit             ICD-10-CM       Cardiac and Vasculature    Essential hypertension I10     Controlled. Continue current medication.          Hyperlipidemia E78.5     Clinically stable. Continue statin.         Dyspnea on exertion R06.09     Recent stress test: Normal (2023).  Please gradually increase your activities/exercise.            Endocrine/Metabolic    Type 2 diabetes mellitus (CMS/HCC) - Primary E11.9     Continue Metformin, was not able to tolerate Jardiance 10 mg daily (2023).          Relevant Orders    POCT glycosylated hemoglobin (Hb A1C) manually resulted (Completed)    POCT Fingerstick Glucose manually resulted (Completed)    Referral to Nutrition Services    Albumin , Urine Random    CBC    Comprehensive Metabolic Panel    Hemoglobin A1C    Vitamin D deficiency E55.9     Continue Vit D supplement.            Gastrointestinal and Abdominal     Steatosis of liver K76.0     Weight loss is advised. Target BMI: 25. Please follow low carbohydrate diet and exercise at least 150 minutes weekly.  Nutritional consultation is available, please let me know if interested.           It was a pleasure to see you today.  I would like to remind you about importance of a healthy lifestyle in order to improve your well-being and live longer.  Try to engage in physical activities for at least 150 minutes per week.  Eat about 10 servings of fruits and vegetables daily. My advice is 2 servings of fruits and 8 servings of vegetables.  For vegetables choose at least half of them green and at least half of them fresh.  Please avoid sugar, salt, fried food and saturated fat.    I spent a total of 30 minutes on the date of service for follow up visit, which included preparing to see the patient, face-to-face patient care, completing clinical documentation, obtaining and/or reviewing separately obtained history, performing a medically appropriate examination, counseling and educating the patient/family/caregiver, ordering medications, tests, or procedures, communicating with other health care providers (not separately reported), independently interpreting results (not separately reported), communicating results to the patient/family/caregiver, and care coordination (not separately reported).      F/up in 3 months or sooner if needed.

## 2023-09-28 ENCOUNTER — APPOINTMENT (OUTPATIENT)
Dept: PRIMARY CARE | Facility: CLINIC | Age: 68
End: 2023-09-28
Payer: COMMERCIAL

## 2023-10-06 ENCOUNTER — PHARMACY VISIT (OUTPATIENT)
Dept: PHARMACY | Facility: CLINIC | Age: 68
End: 2023-10-06
Payer: COMMERCIAL

## 2023-10-06 DIAGNOSIS — E11.9 DIABETES MELLITUS TYPE 2 WITHOUT RETINOPATHY (MULTI): ICD-10-CM

## 2023-10-06 RX ORDER — DEXTROSE 4 G
1 TABLET,CHEWABLE ORAL DAILY
Qty: 1 EACH | Refills: 0 | Status: SHIPPED | OUTPATIENT
Start: 2023-10-06

## 2023-10-06 RX ORDER — DEXTROSE 4 G
TABLET,CHEWABLE ORAL
COMMUNITY
End: 2023-10-06 | Stop reason: SDUPTHER

## 2023-10-07 ENCOUNTER — PHARMACY VISIT (OUTPATIENT)
Dept: PHARMACY | Facility: CLINIC | Age: 68
End: 2023-10-07
Payer: COMMERCIAL

## 2023-10-07 PROCEDURE — RXMED WILLOW AMBULATORY MEDICATION CHARGE

## 2023-10-09 ENCOUNTER — PHARMACY VISIT (OUTPATIENT)
Dept: PHARMACY | Facility: CLINIC | Age: 68
End: 2023-10-09
Payer: COMMERCIAL

## 2023-10-09 PROCEDURE — RXMED WILLOW AMBULATORY MEDICATION CHARGE

## 2023-11-07 ENCOUNTER — PHARMACY VISIT (OUTPATIENT)
Dept: PHARMACY | Facility: CLINIC | Age: 68
End: 2023-11-07
Payer: COMMERCIAL

## 2023-11-16 ENCOUNTER — PHARMACY VISIT (OUTPATIENT)
Dept: PHARMACY | Facility: CLINIC | Age: 68
End: 2023-11-16
Payer: COMMERCIAL

## 2023-11-16 PROCEDURE — RXMED WILLOW AMBULATORY MEDICATION CHARGE

## 2023-12-06 PROCEDURE — RXMED WILLOW AMBULATORY MEDICATION CHARGE

## 2023-12-11 ENCOUNTER — PHARMACY VISIT (OUTPATIENT)
Dept: PHARMACY | Facility: CLINIC | Age: 68
End: 2023-12-11
Payer: COMMERCIAL

## 2023-12-19 ENCOUNTER — PATIENT OUTREACH (OUTPATIENT)
Dept: CARE COORDINATION | Facility: CLINIC | Age: 68
End: 2023-12-19
Payer: COMMERCIAL

## 2023-12-21 PROCEDURE — RXMED WILLOW AMBULATORY MEDICATION CHARGE

## 2023-12-26 ENCOUNTER — PHARMACY VISIT (OUTPATIENT)
Dept: PHARMACY | Facility: CLINIC | Age: 68
End: 2023-12-26
Payer: COMMERCIAL

## 2024-01-04 ENCOUNTER — PHARMACY VISIT (OUTPATIENT)
Dept: PHARMACY | Facility: CLINIC | Age: 69
End: 2024-01-04
Payer: COMMERCIAL

## 2024-01-04 PROCEDURE — RXMED WILLOW AMBULATORY MEDICATION CHARGE

## 2024-01-10 DIAGNOSIS — E11.9 DIABETES MELLITUS TYPE 2 WITHOUT RETINOPATHY (MULTI): Primary | ICD-10-CM

## 2024-01-12 ENCOUNTER — TELEMEDICINE (OUTPATIENT)
Dept: PHARMACY | Facility: HOSPITAL | Age: 69
End: 2024-01-12
Payer: COMMERCIAL

## 2024-01-12 DIAGNOSIS — E11.9 TYPE 2 DIABETES MELLITUS WITHOUT COMPLICATION, WITHOUT LONG-TERM CURRENT USE OF INSULIN (MULTI): ICD-10-CM

## 2024-01-12 DIAGNOSIS — E11.9 DIABETES MELLITUS TYPE 2 WITHOUT RETINOPATHY (MULTI): ICD-10-CM

## 2024-01-12 PROCEDURE — RXMED WILLOW AMBULATORY MEDICATION CHARGE

## 2024-01-12 RX ORDER — METFORMIN HYDROCHLORIDE 500 MG/1
500 TABLET, EXTENDED RELEASE ORAL 2 TIMES DAILY
Qty: 180 TABLET | Refills: 3 | Status: SHIPPED | OUTPATIENT
Start: 2024-01-12 | End: 2024-03-18 | Stop reason: SDUPTHER

## 2024-01-12 RX ORDER — LANCETS
EACH MISCELLANEOUS
Qty: 102 EACH | Refills: 3 | Status: SHIPPED | OUTPATIENT
Start: 2024-01-12

## 2024-01-12 ASSESSMENT — ENCOUNTER SYMPTOMS
POLYPHAGIA: 1
POLYDIPSIA: 1

## 2024-01-12 NOTE — ASSESSMENT & PLAN NOTE
Pooja Burns has uncontrolled type 2 diabetes mellitus complicated by hypertension, and hyperlipidemia as evidenced by her most recent A1c of 7.9% on 09/27/23 which had decreased from 8.5% on 07/08/23. She does report fasting readings at home in the 120s-130s that are hovering around goal. She is due for an appointment with Dr. Thakkar and also due for an updated A1c. She will call the office to schedule her next PCP appointment and will make sure her labwork is completed prior to that appointment.   If patient's A1c continues to be elevated >7% after re-check, Metformin may be increased to 2 tablets twice daily.   CONTINUE:   Accu-Chek Guide test strips use to test once daily   Accu-Chek Fastclix lancets use to test once daily   Metformin  mg 1 tablet by mouth twice daily   Refills sent to [facility] for medications listed above.   Patient information recorded and transmitted to [facility] for VBID override for diabetes medication copay reduction. Patient's insurance will be updated for the additional benefit of $0 copays in 7-10 business days.

## 2024-01-12 NOTE — PROGRESS NOTES
Pharmacy Clinic Annual VBID Consult     Pooja Burns is a 68 y.o. female was referred to Clinical Pharmacy Team to complete a comprehensive medication review (CMR) with a pharmacist as part of the [employer] diabetes program.  The patient was referred for their Diabetes.    Referring Provider: Felicia Valero MD PhD    Subjective   Allergies   Allergen Reactions    Atorvastatin Myalgia    Lovastatin Myalgia    Dicyclomine Rash       [facility]  [address], [address]  [facility] [address]  Phone: [phone] Fax: [phone]    [facility] - [facility] [place] [facility] AT Portal to [facility]  [place] [address]  Phone: [phone] Fax: [phone]      Diabetes  She presents for her follow-up diabetic visit. She has type 2 diabetes mellitus. Her disease course has been improving. There are no hypoglycemic associated symptoms. Associated symptoms include polydipsia and polyphagia. There are no hypoglycemic complications. Symptoms are stable. There are no diabetic complications. Risk factors for coronary artery disease include diabetes mellitus, dyslipidemia and hypertension. Current diabetic treatment includes oral agent (monotherapy). She is compliant with treatment all of the time. Her overall blood glucose range is 130-140 mg/dl. An ACE inhibitor/angiotensin II receptor blocker is being taken. She does not see a podiatrist.Eye exam is current.     Patient has been previously unable to tolerate Jardiance and did not start Trulicity due to not being comfortable with injections.     Adherence: doesn't miss doses of metformin     SMBG  Checks blood sugar at home once daily in the morning  Running in the 120s-130s fasting in the morning      Current Diabetes Pharmacotherapy:  Metformin  mg twice daily     Historical Diabetes Pharmacotherapy:   Jardiance 10 mg (discontinued after 1 week due  "to lightheadedness)  Glipizide (discontinued due to hypoglycemia at the time)     Objective     LAB  Lab Results   Component Value Date    BILITOT 0.5 07/08/2023    CALCIUM 9.8 07/08/2023    CO2 26 07/08/2023     07/08/2023    CREATININE 1.13 (H) 07/08/2023    GLUCOSE 157 (H) 07/08/2023    ALKPHOS 123 07/08/2023    K 4.2 07/08/2023    PROT 7.6 07/08/2023     07/08/2023    AST 30 07/08/2023    ALT 43 07/08/2023    BUN 20 07/08/2023    ANIONGAP 13 07/08/2023    ALBUMIN 4.4 07/08/2023    LIPASE 81 01/31/2023     Lab Results   Component Value Date    CHOL 187 07/08/2023    CHOL 184 07/09/2022    CHOL 185 08/04/2020     Lab Results   Component Value Date    HDL 50.2 07/08/2023    HDL 55.0 07/09/2022    HDL 50.2 08/04/2020     No results found for: \"LDLCALC\"  Lab Results   Component Value Date    TRIG 86 07/08/2023    TRIG 98 07/09/2022    TRIG 103 08/04/2020     No components found for: \"CHOLHDL\"   Lab Results   Component Value Date    HGBA1C 7.9 (A) 09/27/2023    HGBA1C 8.5 (A) 07/08/2023    HGBA1C 7.6 (A) 03/24/2023     The 10-year ASCVD risk score (Dex DK, et al., 2019) is: 13.8%    Values used to calculate the score:      Age: 68 years      Sex: Female      Is Non- : No      Diabetic: Yes      Tobacco smoker: No      Systolic Blood Pressure: 114 mmHg      Is BP treated: Yes      HDL Cholesterol: 50.2 mg/dL      Total Cholesterol: 187 mg/dL    Medication Reconciliation:   Removed:   Doxycycline 100 mg twice daily (therapy completed)     Current Outpatient Medications on File Prior to Visit   Medication Sig Dispense Refill    aspirin 81 mg EC tablet Take 1 tablet (81 mg) by mouth once daily.      azelastine (Astelin) 137 mcg (0.1 %) nasal spray ADMINISTER 2 SPRAYS INTO EACH NOSTRIL IN THE MORNING AND 2 SPRAYS BEFORE BEDTIME. 30 mL 3    blood-glucose meter (Accu-Chek Guide Me Glucose Mtr) misc Use meter as directed daily. 1 each 0    famotidine (Pepcid) 20 mg tablet TAKE 1 TABLET " (20 MG) BY MOUTH 2 TIMES A DAY. 180 tablet 3    hydroCHLOROthiazide (HYDRODiuril) 25 mg tablet TAKE 1 TABLET (25MG) BY MOUTH ONCE DAILY 90 tablet 3    lisinopril 5 mg tablet TAKE 1 TABLET (5 MG) BY MOUTH ONCE DAILY. 90 tablet 3    potassium chloride CR (Klor-Con) 10 mEq ER tablet TAKE 1 TABLET (10MEQ) BY MOUTH ONCE DAILY 90 tablet 3    rosuvastatin (Crestor) 5 mg tablet TAKE 1 TABLET (5 MG) BY MOUTH ONCE DAILY AT BEDTIME. 90 tablet 3    [DISCONTINUED] blood sugar diagnostic strip USE 1 STRIP ONCE DAILY 100 each 3    [DISCONTINUED] lancets misc TEST 3 TIMES A DAY      [DISCONTINUED] metFORMIN XR (Glucophage-XR) 500 mg 24 hr tablet TAKE 1 TABLET (500 MG) BY MOUTH 2 TIMES A DAY. 180 tablet 3    [DISCONTINUED] doxycycline (Vibramycin) 100 mg capsule TAKE ONE CAPSULE TWICE A DAY FOR 10 DAYS 20 capsule 0     No current facility-administered medications on file prior to visit.      Drug Interactions  No significant drug-drug interactions exist that require change in medication therapy    Secondary Prevention:   Statin? yes, Rosuvastatin 5 mg   ACE-I/ARB? yes, Lisinopril 5 mg   Aspirin? 81 mg daily   Influenza Immunization: 09/27/23  Pneumonia Immunization: PPSV23 01/26/10, 12/03/20  Eye Exam: <1 year ago   Foot Exam: None    Assessment/Plan   Problem List Items Addressed This Visit       Diabetes mellitus type 2 without retinopathy (CMS/HCC)     Pooja Burns has uncontrolled type 2 diabetes mellitus complicated by hypertension, and hyperlipidemia as evidenced by her most recent A1c of 7.9% on 09/27/23 which had decreased from 8.5% on 07/08/23. She does report fasting readings at home in the 120s-130s that are hovering around goal. She is due for an appointment with Dr. Thakkar and also due for an updated A1c. She will call the office to schedule her next PCP appointment and will make sure her labwork is completed prior to that appointment.   If patient's A1c continues to be elevated >7% after re-check, Metformin may be  increased to 2 tablets twice daily.   CONTINUE:   Accu-Chek Guide test strips use to test once daily   Accu-Chek Fastclix lancets use to test once daily   Metformin  mg 1 tablet by mouth twice daily   Refills sent to [facility] for medications listed above.   Patient information recorded and transmitted to [facility] for VBID override for diabetes medication copay reduction. Patient's insurance will be updated for the additional benefit of $0 copays in 7-10 business days.         Relevant Medications    lancets misc    metFORMIN XR (Glucophage-XR) 500 mg 24 hr tablet    Other Relevant Orders    Follow Up In [facility] - Pharmacy    Type 2 diabetes mellitus (CMS/[facility])    Relevant Medications    blood sugar diagnostic strip    lancets misc    Other Relevant Orders    Follow Up In [facility] - Pharmacy      Pharmacy Follow-Up: VBID Renewal 01/03/25  PCP Follow-Up: Needs scheduled     Austin Gregg, PharmD     Continue all meds under the continuation of care with the referring provider and clinical pharmacy team.

## 2024-01-19 ENCOUNTER — PHARMACY VISIT (OUTPATIENT)
Dept: PHARMACY | Facility: CLINIC | Age: 69
End: 2024-01-19
Payer: COMMERCIAL

## 2024-02-01 ENCOUNTER — APPOINTMENT (OUTPATIENT)
Dept: AUDIOLOGY | Facility: CLINIC | Age: 69
End: 2024-02-01
Payer: COMMERCIAL

## 2024-02-01 ENCOUNTER — APPOINTMENT (OUTPATIENT)
Dept: OTOLARYNGOLOGY | Facility: CLINIC | Age: 69
End: 2024-02-01
Payer: COMMERCIAL

## 2024-02-19 PROCEDURE — RXMED WILLOW AMBULATORY MEDICATION CHARGE

## 2024-02-20 ENCOUNTER — PHARMACY VISIT (OUTPATIENT)
Dept: PHARMACY | Facility: CLINIC | Age: 69
End: 2024-02-20
Payer: COMMERCIAL

## 2024-03-13 ENCOUNTER — PHARMACY VISIT (OUTPATIENT)
Dept: PHARMACY | Facility: CLINIC | Age: 69
End: 2024-03-13
Payer: COMMERCIAL

## 2024-03-13 ENCOUNTER — APPOINTMENT (OUTPATIENT)
Dept: CARDIOLOGY | Facility: HOSPITAL | Age: 69
End: 2024-03-13
Payer: COMMERCIAL

## 2024-03-13 ENCOUNTER — APPOINTMENT (OUTPATIENT)
Dept: RADIOLOGY | Facility: HOSPITAL | Age: 69
End: 2024-03-13
Payer: COMMERCIAL

## 2024-03-13 ENCOUNTER — HOSPITAL ENCOUNTER (EMERGENCY)
Facility: HOSPITAL | Age: 69
Discharge: HOME | End: 2024-03-13
Attending: STUDENT IN AN ORGANIZED HEALTH CARE EDUCATION/TRAINING PROGRAM
Payer: COMMERCIAL

## 2024-03-13 VITALS
WEIGHT: 135 LBS | RESPIRATION RATE: 16 BRPM | HEIGHT: 62 IN | TEMPERATURE: 97.5 F | SYSTOLIC BLOOD PRESSURE: 125 MMHG | OXYGEN SATURATION: 97 % | BODY MASS INDEX: 24.84 KG/M2 | DIASTOLIC BLOOD PRESSURE: 76 MMHG | HEART RATE: 81 BPM

## 2024-03-13 DIAGNOSIS — N30.00 ACUTE CYSTITIS WITHOUT HEMATURIA: Primary | ICD-10-CM

## 2024-03-13 DIAGNOSIS — K64.8 INTERNAL HEMORRHOID: ICD-10-CM

## 2024-03-13 LAB
ABO GROUP (TYPE) IN BLOOD: NORMAL
ALBUMIN SERPL BCP-MCNC: 4.5 G/DL (ref 3.4–5)
ALP SERPL-CCNC: 94 U/L (ref 33–136)
ALT SERPL W P-5'-P-CCNC: 47 U/L (ref 7–45)
ANION GAP SERPL CALC-SCNC: 14 MMOL/L (ref 10–20)
ANTIBODY SCREEN: NORMAL
APPEARANCE UR: ABNORMAL
AST SERPL W P-5'-P-CCNC: 28 U/L (ref 9–39)
BASOPHILS # BLD AUTO: NORMAL 10*3/UL
BASOPHILS NFR BLD AUTO: NORMAL %
BILIRUB SERPL-MCNC: 0.6 MG/DL (ref 0–1.2)
BILIRUB UR STRIP.AUTO-MCNC: NEGATIVE MG/DL
BUN SERPL-MCNC: 21 MG/DL (ref 6–23)
CALCIUM SERPL-MCNC: 10.1 MG/DL (ref 8.6–10.3)
CHLORIDE SERPL-SCNC: 100 MMOL/L (ref 98–107)
CO2 SERPL-SCNC: 27 MMOL/L (ref 21–32)
COLOR UR: YELLOW
CREAT SERPL-MCNC: 1.31 MG/DL (ref 0.5–1.05)
EGFRCR SERPLBLD CKD-EPI 2021: 45 ML/MIN/1.73M*2
EOSINOPHIL # BLD AUTO: NORMAL 10*3/UL
EOSINOPHIL NFR BLD AUTO: NORMAL %
ERYTHROCYTE [DISTWIDTH] IN BLOOD BY AUTOMATED COUNT: 12.8 % (ref 11.5–14.5)
FLUAV RNA RESP QL NAA+PROBE: NOT DETECTED
FLUBV RNA RESP QL NAA+PROBE: NOT DETECTED
GLUCOSE SERPL-MCNC: 156 MG/DL (ref 74–99)
GLUCOSE UR STRIP.AUTO-MCNC: NEGATIVE MG/DL
HCT VFR BLD AUTO: 41.5 % (ref 36–46)
HGB BLD-MCNC: 13.7 G/DL (ref 12–16)
HOLD SPECIMEN: NORMAL
IMM GRANULOCYTES NFR BLD AUTO: 0.2 % (ref 0–0.9)
INR PPP: 1 (ref 0.9–1.1)
KETONES UR STRIP.AUTO-MCNC: NEGATIVE MG/DL
LACTATE SERPL-SCNC: 1.7 MMOL/L (ref 0.4–2)
LEUKOCYTE ESTERASE UR QL STRIP.AUTO: ABNORMAL
LIPASE SERPL-CCNC: 83 U/L (ref 9–82)
LYMPHOCYTES # BLD AUTO: NORMAL 10*3/UL
LYMPHOCYTES NFR BLD AUTO: NORMAL %
MCHC RBC AUTO-ENTMCNC: 33 G/DL (ref 32–36)
MCV RBC AUTO: 91 FL (ref 80–100)
MONOCYTES # BLD AUTO: NORMAL 10*3/UL
MONOCYTES NFR BLD AUTO: NORMAL %
NEUTROPHILS # BLD AUTO: NORMAL 10*3/UL
NEUTROPHILS NFR BLD AUTO: NORMAL %
NITRITE UR QL STRIP.AUTO: NEGATIVE
PH UR STRIP.AUTO: 5 [PH]
PLATELET # BLD AUTO: 251 X10*3/UL (ref 150–450)
POTASSIUM SERPL-SCNC: 4.4 MMOL/L (ref 3.5–5.3)
PROT SERPL-MCNC: 7.5 G/DL (ref 6.4–8.2)
PROT UR STRIP.AUTO-MCNC: NEGATIVE MG/DL
PROTHROMBIN TIME: 10.7 SECONDS (ref 9.8–12.8)
RBC # BLD AUTO: 4.54 X10*6/UL (ref 4–5.2)
RBC # UR STRIP.AUTO: NEGATIVE /UL
RBC #/AREA URNS AUTO: ABNORMAL /HPF
RH FACTOR (ANTIGEN D): NORMAL
RSV RNA RESP QL NAA+PROBE: NOT DETECTED
SARS-COV-2 RNA RESP QL NAA+PROBE: NOT DETECTED
SODIUM SERPL-SCNC: 137 MMOL/L (ref 136–145)
SP GR UR STRIP.AUTO: 1.03
SQUAMOUS #/AREA URNS AUTO: ABNORMAL /HPF
UROBILINOGEN UR STRIP.AUTO-MCNC: <2 MG/DL
WBC # BLD AUTO: 9.5 X10*3/UL (ref 4.4–11.3)
WBC #/AREA URNS AUTO: >50 /HPF

## 2024-03-13 PROCEDURE — 99285 EMERGENCY DEPT VISIT HI MDM: CPT | Mod: 25

## 2024-03-13 PROCEDURE — 96374 THER/PROPH/DIAG INJ IV PUSH: CPT

## 2024-03-13 PROCEDURE — 93005 ELECTROCARDIOGRAM TRACING: CPT

## 2024-03-13 PROCEDURE — 74177 CT ABD & PELVIS W/CONTRAST: CPT | Mod: FOREIGN READ | Performed by: RADIOLOGY

## 2024-03-13 PROCEDURE — 80053 COMPREHEN METABOLIC PANEL: CPT | Performed by: STUDENT IN AN ORGANIZED HEALTH CARE EDUCATION/TRAINING PROGRAM

## 2024-03-13 PROCEDURE — 2500000004 HC RX 250 GENERAL PHARMACY W/ HCPCS (ALT 636 FOR OP/ED): Performed by: STUDENT IN AN ORGANIZED HEALTH CARE EDUCATION/TRAINING PROGRAM

## 2024-03-13 PROCEDURE — 74177 CT ABD & PELVIS W/CONTRAST: CPT

## 2024-03-13 PROCEDURE — 96375 TX/PRO/DX INJ NEW DRUG ADDON: CPT

## 2024-03-13 PROCEDURE — 87086 URINE CULTURE/COLONY COUNT: CPT | Mod: STJLAB | Performed by: STUDENT IN AN ORGANIZED HEALTH CARE EDUCATION/TRAINING PROGRAM

## 2024-03-13 PROCEDURE — 36415 COLL VENOUS BLD VENIPUNCTURE: CPT | Performed by: STUDENT IN AN ORGANIZED HEALTH CARE EDUCATION/TRAINING PROGRAM

## 2024-03-13 PROCEDURE — RXMED WILLOW AMBULATORY MEDICATION CHARGE

## 2024-03-13 PROCEDURE — 87637 SARSCOV2&INF A&B&RSV AMP PRB: CPT | Performed by: STUDENT IN AN ORGANIZED HEALTH CARE EDUCATION/TRAINING PROGRAM

## 2024-03-13 PROCEDURE — 2550000001 HC RX 255 CONTRASTS: Performed by: STUDENT IN AN ORGANIZED HEALTH CARE EDUCATION/TRAINING PROGRAM

## 2024-03-13 PROCEDURE — 85610 PROTHROMBIN TIME: CPT | Performed by: STUDENT IN AN ORGANIZED HEALTH CARE EDUCATION/TRAINING PROGRAM

## 2024-03-13 PROCEDURE — 83605 ASSAY OF LACTIC ACID: CPT | Performed by: STUDENT IN AN ORGANIZED HEALTH CARE EDUCATION/TRAINING PROGRAM

## 2024-03-13 PROCEDURE — 86901 BLOOD TYPING SEROLOGIC RH(D): CPT | Performed by: STUDENT IN AN ORGANIZED HEALTH CARE EDUCATION/TRAINING PROGRAM

## 2024-03-13 PROCEDURE — 81003 URINALYSIS AUTO W/O SCOPE: CPT | Performed by: STUDENT IN AN ORGANIZED HEALTH CARE EDUCATION/TRAINING PROGRAM

## 2024-03-13 PROCEDURE — 99285 EMERGENCY DEPT VISIT HI MDM: CPT | Performed by: STUDENT IN AN ORGANIZED HEALTH CARE EDUCATION/TRAINING PROGRAM

## 2024-03-13 PROCEDURE — 83690 ASSAY OF LIPASE: CPT | Performed by: STUDENT IN AN ORGANIZED HEALTH CARE EDUCATION/TRAINING PROGRAM

## 2024-03-13 RX ORDER — ONDANSETRON HYDROCHLORIDE 2 MG/ML
4 INJECTION, SOLUTION INTRAVENOUS ONCE
Status: COMPLETED | OUTPATIENT
Start: 2024-03-13 | End: 2024-03-13

## 2024-03-13 RX ORDER — MORPHINE SULFATE 4 MG/ML
4 INJECTION, SOLUTION INTRAMUSCULAR; INTRAVENOUS ONCE
Status: COMPLETED | OUTPATIENT
Start: 2024-03-13 | End: 2024-03-13

## 2024-03-13 RX ORDER — CEPHALEXIN 500 MG/1
500 CAPSULE ORAL 2 TIMES DAILY
Qty: 14 CAPSULE | Refills: 0 | Status: SHIPPED | OUTPATIENT
Start: 2024-03-13 | End: 2024-03-20

## 2024-03-13 RX ADMIN — ONDANSETRON 4 MG: 2 INJECTION INTRAMUSCULAR; INTRAVENOUS at 03:18

## 2024-03-13 RX ADMIN — MORPHINE SULFATE 4 MG: 4 INJECTION, SOLUTION INTRAMUSCULAR; INTRAVENOUS at 03:18

## 2024-03-13 RX ADMIN — IOHEXOL 75 ML: 350 INJECTION, SOLUTION INTRAVENOUS at 03:32

## 2024-03-13 ASSESSMENT — PAIN DESCRIPTION - LOCATION
LOCATION: ABDOMEN
LOCATION: ABDOMEN

## 2024-03-13 ASSESSMENT — LIFESTYLE VARIABLES
HAVE PEOPLE ANNOYED YOU BY CRITICIZING YOUR DRINKING: NO
HAVE YOU EVER FELT YOU SHOULD CUT DOWN ON YOUR DRINKING: NO
EVER HAD A DRINK FIRST THING IN THE MORNING TO STEADY YOUR NERVES TO GET RID OF A HANGOVER: NO
EVER FELT BAD OR GUILTY ABOUT YOUR DRINKING: NO

## 2024-03-13 ASSESSMENT — COLUMBIA-SUICIDE SEVERITY RATING SCALE - C-SSRS
2. HAVE YOU ACTUALLY HAD ANY THOUGHTS OF KILLING YOURSELF?: NO
6. HAVE YOU EVER DONE ANYTHING, STARTED TO DO ANYTHING, OR PREPARED TO DO ANYTHING TO END YOUR LIFE?: NO
1. IN THE PAST MONTH, HAVE YOU WISHED YOU WERE DEAD OR WISHED YOU COULD GO TO SLEEP AND NOT WAKE UP?: NO

## 2024-03-13 ASSESSMENT — PAIN DESCRIPTION - PAIN TYPE: TYPE: CHRONIC PAIN

## 2024-03-13 ASSESSMENT — PAIN SCALES - GENERAL
PAINLEVEL_OUTOF10: 8
PAINLEVEL_OUTOF10: 0 - NO PAIN
PAINLEVEL_OUTOF10: 3
PAINLEVEL_OUTOF10: 8

## 2024-03-13 ASSESSMENT — PAIN - FUNCTIONAL ASSESSMENT
PAIN_FUNCTIONAL_ASSESSMENT: 0-10

## 2024-03-13 NOTE — ED PROVIDER NOTES
EMERGENCY DEPARTMENT ENCOUNTER      Pt Name: Pooja Burns  MRN: [id number]  Birthdate 1955  Date of evaluation: 3/13/2024  Provider: Nelly Haynes DO    CHIEF COMPLAINT       Chief Complaint   Patient presents with    Abdominal Pain     Diverticulitis flare up         HISTORY OF PRESENT ILLNESS    68-year-old female who presents to the emergency department with scant amount of bright red blood per rectum, and left lower quadrant abdominal pain.  No fevers, no nausea, no vomiting, no chest pain or difficulty breathing.  States that over the last day she has noticed swipes of bright red blood on the toilet paper, but nothing that seems rodrigues, and no blood with this with the poop.  She is not on a blood thinner.  She additionally states that she has got some discomfort in her left lower quadrant of her abdomen.  Discussed with a friend of hers who is a nurse who was concerned for diverticulitis flareup and sent her to the emergency department for evaluation.  She has diverticula diagnosed on prior colonoscopy, but has never had diverticulitis.  Additionally states that she has had 1 episode of diarrhea approximately 3 days ago, followed by several episodes of stool that is very long and skinny.  She is concerned that she may be constipated.          Nursing Notes were reviewed.    PAST MEDICAL HISTORY     Past Medical History:   Diagnosis Date    Benign paroxysmal vertigo, unspecified ear 03/22/2022    Benign paroxysmal positional vertigo    Calculus of gallbladder without cholecystitis without obstruction 11/14/2019    Gallstones    Chronic cough 10/18/2016    Chronic cough    Essential (primary) hypertension 03/12/2021    Hypertension    Essential (primary) hypertension 11/06/2022    Essential hypertension    Personal history of other diseases of the circulatory system 04/26/2022    History of essential hypertension    Personal history of other diseases of the digestive system 10/09/2018    History of  cholelithiasis    Personal history of other diseases of the digestive system     History of hiatal hernia    Personal history of other diseases of the nervous system and sense organs     History of sleep apnea    Personal history of other diseases of the respiratory system 11/19/2018    History of acute sinusitis    Personal history of other endocrine, nutritional and metabolic disease     History of high cholesterol    Personal history of other endocrine, nutritional and metabolic disease     History of diabetes mellitus    Personal history of other specified conditions 04/26/2022    History of chest pain    Personal history of other specified conditions 11/21/2016    History of nausea    Type 2 diabetes mellitus without complications (CMS/HCC) 11/04/2022    Diabetes mellitus type 2 without retinopathy    Unspecified disorder of ear, unspecified ear 04/26/2022    Ear, nose and throat disorder    Urinary tract infection, site not specified 04/02/2018    Acute lower UTI         SURGICAL HISTORY       Past Surgical History:   Procedure Laterality Date    APPENDECTOMY  08/22/2013    Appendectomy    HYSTERECTOMY  08/22/2013    Hysterectomy    OTHER SURGICAL HISTORY  11/22/2016    Incisional Hernia Repair - Incarcerated    OTHER SURGICAL HISTORY  10/20/2015    Nephrectomy    RHINOPLASTY  08/22/2013    Rhinoplasty         CURRENT MEDICATIONS       Previous Medications    ASPIRIN 81 MG EC TABLET    Take 1 tablet (81 mg) by mouth once daily.    AZELASTINE (ASTELIN) 137 MCG (0.1 %) NASAL SPRAY    ADMINISTER 2 SPRAYS INTO EACH NOSTRIL IN THE MORNING AND 2 SPRAYS BEFORE BEDTIME.    BLOOD SUGAR DIAGNOSTIC STRIP    Use 1 test strip to check blood sugar once daily or as directed    BLOOD-GLUCOSE METER (ACCU-CHEK GUIDE ME GLUCOSE MTR) MISC    Use meter as directed daily.    FAMOTIDINE (PEPCID) 20 MG TABLET    TAKE 1 TABLET (20 MG) BY MOUTH 2 TIMES A DAY.    HYDROCHLOROTHIAZIDE (HYDRODIURIL) 25 MG TABLET    TAKE 1 TABLET (25MG) BY  MOUTH ONCE DAILY    LANCETS MISC    Use 1 lancet to check blood sugar once daily or as directed    LISINOPRIL 5 MG TABLET    TAKE 1 TABLET (5 MG) BY MOUTH ONCE DAILY.    METFORMIN XR (GLUCOPHAGE-XR) 500 MG 24 HR TABLET    TAKE 1 TABLET (500 MG) BY MOUTH 2 TIMES A DAY.    POTASSIUM CHLORIDE CR (KLOR-CON) 10 MEQ ER TABLET    TAKE 1 TABLET (10MEQ) BY MOUTH ONCE DAILY    ROSUVASTATIN (CRESTOR) 5 MG TABLET    TAKE 1 TABLET (5 MG) BY MOUTH ONCE DAILY AT BEDTIME.       ALLERGIES     Atorvastatin, Lovastatin, and Dicyclomine    FAMILY HISTORY       Family History   Problem Relation Name Age of Onset    Heart attack Mother      Hypertension Mother      Other (acute myocardial infarction) Father      Heart disease Other            SOCIAL HISTORY       Social History     Socioeconomic History    Marital status:      Spouse name: None    Number of children: None    Years of education: None    Highest education level: None   Occupational History    None   Tobacco Use    Smoking status: Never    Smokeless tobacco: Never   Substance and Sexual Activity    Alcohol use: Never    Drug use: Never    Sexual activity: None   Other Topics Concern    None   Social History Narrative    None     Social Determinants of Health     Financial Resource Strain: Not on file   Food Insecurity: Not on file   Transportation Needs: Not on file   Physical Activity: Not on file   Stress: Not on file   Social Connections: Not on file   Intimate Partner Violence: Not on file   Housing Stability: Not on file       SCREENINGS                        PHYSICAL EXAM    (up to 7 for level 4, 8 or more for level 5)     ED Triage Vitals   Temperature Heart Rate Respirations BP   03/13/24 0157 03/13/24 0157 03/13/24 0157 03/13/24 0157   36.4 °C (97.5 °F) 70 18 156/77      Pulse Ox Temp src Heart Rate Source Patient Position   03/13/24 0157 -- 03/13/24 0157 03/13/24 0427   98 %  Monitor Sitting      BP Location FiO2 (%)     03/13/24 0427 --     Right arm         Physical Exam  Vitals and nursing note reviewed.   Constitutional:       General: She is not in acute distress.     Appearance: She is well-developed.   HENT:      Head: Normocephalic and atraumatic.   Eyes:      Conjunctiva/sclera: Conjunctivae normal.   Cardiovascular:      Rate and Rhythm: Normal rate and regular rhythm.      Heart sounds: No murmur heard.  Pulmonary:      Effort: Pulmonary effort is normal. No respiratory distress.      Breath sounds: Normal breath sounds.   Abdominal:      Palpations: Abdomen is soft.      Tenderness: There is abdominal tenderness (Mild left lower quadrant). There is no right CVA tenderness, left CVA tenderness or guarding.   Musculoskeletal:         General: No swelling.      Cervical back: Neck supple.   Skin:     General: Skin is warm and dry.      Capillary Refill: Capillary refill takes less than 2 seconds.   Neurological:      Mental Status: She is alert.   Psychiatric:         Mood and Affect: Mood normal.          DIAGNOSTIC RESULTS     LABS:  Labs Reviewed   COMPREHENSIVE METABOLIC PANEL - Abnormal       Result Value    Glucose 156 (*)     Sodium 137      Potassium 4.4      Chloride 100      Bicarbonate 27      Anion Gap 14      Urea Nitrogen 21      Creatinine 1.31 (*)     eGFR 45 (*)     Calcium 10.1      Albumin 4.5      Alkaline Phosphatase 94      Total Protein 7.5      AST 28      Bilirubin, Total 0.6      ALT 47 (*)    LIPASE - Abnormal    Lipase 83 (*)     Narrative:     Venipuncture immediately after or during the administration of Metamizole may lead to falsely low results. Testing should be performed immediately prior to Metamizole dosing.   URINALYSIS WITH REFLEX CULTURE AND MICROSCOPIC - Abnormal    Color, Urine Yellow      Appearance, Urine Hazy (*)     Specific Gravity, Urine 1.034      pH, Urine 5.0      Protein, Urine NEGATIVE      Glucose, Urine NEGATIVE      Blood, Urine NEGATIVE      Ketones, Urine NEGATIVE      Bilirubin, Urine NEGATIVE       Urobilinogen, Urine <2.0      Nitrite, Urine NEGATIVE      Leukocyte Esterase, Urine LARGE (3+) (*)    MICROSCOPIC ONLY, URINE - Abnormal    WBC, Urine >50 (*)     RBC, Urine 3-5      Squamous Epithelial Cells, Urine 1-9 (SPARSE)     SARS-COV-2 AND INFLUENZA A/B PCR - Normal    Flu A Result Not Detected      Flu B Result Not Detected      Coronavirus 2019, PCR Not Detected      Narrative:     This assay has received FDA Emergency Use Authorization (EUA) and  is only authorized for the duration of time that circumstances exist to justify the authorization of the emergency use of in vitro diagnostic tests for the detection of SARS-CoV-2 virus and/or diagnosis of COVID-19 infection under section 564(b)(1) of the Act, 21 U.S.C. 360bbb-3(b)(1). Testing for SARS-CoV-2 is only recommended for patients who meet current clinical and/or epidemiological criteria as defined by federal, state, or local public health directives. This assay is an in vitro diagnostic nucleic acid amplification test for the qualitative detection of SARS-CoV-2, Influenza A, and Influenza B from nasopharyngeal specimens and has been validated for use at [facility]. Negative results do not preclude COVID-19 infections or Influenza A/B infections, and should not be used as the sole basis for diagnosis, treatment, or other management decisions. If Influenza A/B and RSV PCR results are negative, testing for Parainfluenza virus, Adenovirus and Metapneumovirus is routinely performed for [facility] [place] and intensive care inpatients, and is available on other patients by placing an add-on request.    RSV PCR - Normal    RSV PCR Not Detected      Narrative:     This assay is an FDA-cleared, in vitro diagnostic nucleic acid amplification test for the detection of RSV from nasopharyngeal specimens, and has been validated for use at [facility]. Negative results do not preclude RSV infections, and should not  be used as the sole basis for diagnosis, treatment, or other management decisions. If Influenza A/B and RSV PCR results are negative, testing for Parainfluenza virus, Adenovirus and Metapneumovirus is routinely performed for pediatric oncology and intensive care inpatients at [facility], and is available on other patients by placing an add-on request.       LACTATE - Normal    Lactate 1.7      Narrative:     Venipuncture immediately after or during the administration of Metamizole may lead to falsely low results. Testing should be performed immediately  prior to Metamizole dosing.   PROTIME-INR - Normal    Protime 10.7      INR 1.0     URINE CULTURE   CBC WITH AUTO DIFFERENTIAL    WBC 9.5      RBC 4.54      Hemoglobin 13.7      Hematocrit 41.5      MCV 91      MCHC 33.0      RDW 12.8      Platelets 251      Neutrophils %        Immature Granulocytes %, Automated 0.2      Lymphocytes %        Monocytes %        Eosinophils %        Basophils %        Neutrophils Absolute        Lymphocytes Absolute        Monocytes Absolute        Eosinophils Absolute        Basophils Absolute       TYPE AND SCREEN    ABO TYPE A      Rh TYPE POS      ANTIBODY SCREEN NEG     URINALYSIS WITH REFLEX CULTURE AND MICROSCOPIC    Narrative:     The following orders were created for panel order Urinalysis with Reflex Culture and Microscopic.  Procedure                               Abnormality         Status                     ---------                               -----------         ------                     Urinalysis with Reflex C...[[id number]]  Abnormal            Final result               Extra Urine Gray Tube[[id number]]                            In process                   Please view results for these tests on the individual orders.   EXTRA URINE GRAY TUBE       All other labs were within normal range or not returned as of this dictation.    Imaging  CT abdomen pelvis w IV contrast   Final Result   Moderate fecal material is seen  throughout the  colon without evidence   for bowel obstruction.   Colonic diverticulosis without distinct CT evidence for acute   diverticulitis.   Hepatosplenomegaly with hepatic steatosis.   Signed by Manish Knight MD           Procedures  Procedures     EMERGENCY DEPARTMENT COURSE/MDM:   Pooja Burns is a 68 y.o. female presenting to the ED for evaluation of had concerns including Abdominal Pain (Diverticulitis flare up)..   Medical Decision Making  68-year-old female who presented to the emergency department with left lower quadrant abdominal pain and scant amount of bright red blood per rectum.  Abdominal pain workup initiated.  She had no leukocytosis or signs of anemia and no thrombocytopenia.  She had normal kidney and liver function.  CT showed moderate stool burden but no evidence of impaction or stool in the rectal vault.  Urinalysis is positive for pyuria and LE.  Will be covered with Keflex.  ANITA revealed palpable internal hemorrhoids it is not actively bleeding.  Discharged with GI follow-up.        Diagnoses as of 03/13/24 0737   Acute cystitis without hematuria   Internal hemorrhoid          External records reviewed: I reviewed external records including outpatient, PCP records, and prior discharge summaries    I have reviewed this case with the ED attending physician, and the attending agrees with the plan. Patient or family was counselled regarding labs, imaging, likely diagnosis, and plan. All questions were answered.     Nelly Haynes DO  PGY-4, emergency medicine    The above documentation was completed with the use of speech recognition software. It may contain dictation errors secondary to limitations of the software.      ED Medications administered this visit:    Medications   morphine injection 4 mg (4 mg intravenous Given 3/13/24 0318)   ondansetron (Zofran) injection 4 mg (4 mg intravenous Given 3/13/24 0318)   iohexol (OMNIPaque) 350 mg iodine/mL solution 75 mL (75 mL intravenous  Given 3/13/24 0332)       New Prescriptions from this visit:    New Prescriptions    No medications on file       Final Impression:   1. Acute cystitis without hematuria    2. Internal hemorrhoid          (Please note that portions of this note were completed with a voice recognition program.  Efforts were made to edit the dictations but occasionally words are mis-transcribed.)     Nelly Haynes, DO  Resident  03/13/24 5140

## 2024-03-13 NOTE — DISCHARGE INSTRUCTIONS
If you develop fevers, if you have nausea and vomiting that prevents you from keeping fluids down, if you notice any blood in your urine or stool, or if you develop difficulty breathing, or chest pain, or for any additional concerns, return to the emergency department for evaluation.    Please follow-up outpatient with your primary care provider. If you do not have a primary care provider, you may follow-up with the office listed above.    I given you a phone number to call and schedule with GI, you may schedule first available to ask questions regarding your internal hemorrhoids and symptoms of skinny poop.

## 2024-03-16 LAB
BACTERIA UR CULT: ABNORMAL
BACTERIA UR CULT: ABNORMAL
CARBA5 NEG: ABNORMAL
CARBA5 NEG: ABNORMAL

## 2024-03-18 ENCOUNTER — OFFICE VISIT (OUTPATIENT)
Dept: PRIMARY CARE | Facility: CLINIC | Age: 69
End: 2024-03-18
Payer: COMMERCIAL

## 2024-03-18 ENCOUNTER — TELEPHONE (OUTPATIENT)
Dept: PHARMACY | Facility: HOSPITAL | Age: 69
End: 2024-03-18

## 2024-03-18 VITALS
TEMPERATURE: 97.5 F | HEART RATE: 80 BPM | WEIGHT: 149 LBS | BODY MASS INDEX: 28.13 KG/M2 | DIASTOLIC BLOOD PRESSURE: 70 MMHG | HEIGHT: 61 IN | OXYGEN SATURATION: 97 % | RESPIRATION RATE: 16 BRPM | SYSTOLIC BLOOD PRESSURE: 120 MMHG

## 2024-03-18 DIAGNOSIS — Z12.31 BREAST CANCER SCREENING BY MAMMOGRAM: ICD-10-CM

## 2024-03-18 DIAGNOSIS — K76.0 STEATOSIS OF LIVER: ICD-10-CM

## 2024-03-18 DIAGNOSIS — E11.9 DIABETES MELLITUS TYPE 2 WITHOUT RETINOPATHY (MULTI): ICD-10-CM

## 2024-03-18 DIAGNOSIS — Q25.40 ABNORMALITY OF THORACIC AORTA (HHS-HCC): ICD-10-CM

## 2024-03-18 DIAGNOSIS — R74.8 ELEVATED LIPASE: ICD-10-CM

## 2024-03-18 DIAGNOSIS — E78.2 MIXED HYPERLIPIDEMIA: ICD-10-CM

## 2024-03-18 DIAGNOSIS — R16.0 HEPATOMEGALY: ICD-10-CM

## 2024-03-18 DIAGNOSIS — M79.674 GREAT TOE PAIN, RIGHT: Primary | ICD-10-CM

## 2024-03-18 DIAGNOSIS — N39.0 URINARY TRACT INFECTION WITHOUT HEMATURIA, SITE UNSPECIFIED: ICD-10-CM

## 2024-03-18 DIAGNOSIS — E11.9 TYPE 2 DIABETES MELLITUS WITHOUT COMPLICATION, WITHOUT LONG-TERM CURRENT USE OF INSULIN (MULTI): ICD-10-CM

## 2024-03-18 DIAGNOSIS — I10 ESSENTIAL HYPERTENSION: ICD-10-CM

## 2024-03-18 DIAGNOSIS — K64.9 HEMORRHOIDS, UNSPECIFIED HEMORRHOID TYPE: ICD-10-CM

## 2024-03-18 PROCEDURE — RXMED WILLOW AMBULATORY MEDICATION CHARGE

## 2024-03-18 PROCEDURE — 1160F RVW MEDS BY RX/DR IN RCRD: CPT | Performed by: INTERNAL MEDICINE

## 2024-03-18 PROCEDURE — 3074F SYST BP LT 130 MM HG: CPT | Performed by: INTERNAL MEDICINE

## 2024-03-18 PROCEDURE — 3078F DIAST BP <80 MM HG: CPT | Performed by: INTERNAL MEDICINE

## 2024-03-18 PROCEDURE — 1159F MED LIST DOCD IN RCRD: CPT | Performed by: INTERNAL MEDICINE

## 2024-03-18 PROCEDURE — 99215 OFFICE O/P EST HI 40 MIN: CPT | Performed by: INTERNAL MEDICINE

## 2024-03-18 PROCEDURE — 1036F TOBACCO NON-USER: CPT | Performed by: INTERNAL MEDICINE

## 2024-03-18 PROCEDURE — 4010F ACE/ARB THERAPY RXD/TAKEN: CPT | Performed by: INTERNAL MEDICINE

## 2024-03-18 RX ORDER — ROSUVASTATIN CALCIUM 5 MG/1
5 TABLET, COATED ORAL NIGHTLY
Qty: 90 TABLET | Refills: 3 | Status: SHIPPED | OUTPATIENT
Start: 2024-03-18 | End: 2025-03-18

## 2024-03-18 RX ORDER — METFORMIN HYDROCHLORIDE 500 MG/1
500 TABLET, EXTENDED RELEASE ORAL 2 TIMES DAILY
Qty: 180 TABLET | Refills: 3 | Status: SHIPPED | OUTPATIENT
Start: 2024-03-18 | End: 2025-03-18

## 2024-03-18 ASSESSMENT — ENCOUNTER SYMPTOMS
WOUND: 0
BRUISES/BLEEDS EASILY: 0
MYALGIAS: 0
CARDIOVASCULAR NEGATIVE: 1
TROUBLE SWALLOWING: 0
CONSTIPATION: 0
EYES NEGATIVE: 1
ANAL BLEEDING: 1
LIGHT-HEADEDNESS: 0
JOINT SWELLING: 0
WHEEZING: 0
PALPITATIONS: 0
MUSCULOSKELETAL NEGATIVE: 1
EYE PAIN: 0
FREQUENCY: 0
RESPIRATORY NEGATIVE: 1
NAUSEA: 0
UNEXPECTED WEIGHT CHANGE: 0
CONSTITUTIONAL NEGATIVE: 1
ACTIVITY CHANGE: 0
DYSURIA: 0
SEIZURES: 0
ABDOMINAL PAIN: 1
VOICE CHANGE: 0
AGITATION: 0
APPETITE CHANGE: 0
ENDOCRINE NEGATIVE: 1
SPEECH DIFFICULTY: 0
FACIAL ASYMMETRY: 0
SORE THROAT: 0
CHEST TIGHTNESS: 0
SINUS PAIN: 0
EYE REDNESS: 0
SLEEP DISTURBANCE: 0
FLANK PAIN: 0
SINUS PRESSURE: 0
NERVOUS/ANXIOUS: 0
POLYPHAGIA: 0
ADENOPATHY: 0
NECK STIFFNESS: 0
BACK PAIN: 0
HALLUCINATIONS: 0
HEMATOLOGIC/LYMPHATIC NEGATIVE: 1
STRIDOR: 0
BLOOD IN STOOL: 0
ARTHRALGIAS: 0
DIARRHEA: 0
TREMORS: 0
COLOR CHANGE: 0
HEADACHES: 0
CONFUSION: 0
PSYCHIATRIC NEGATIVE: 1
SHORTNESS OF BREATH: 0
NUMBNESS: 0
DIFFICULTY URINATING: 0
DIZZINESS: 0
EYE DISCHARGE: 0
VOMITING: 0
ALLERGIC/IMMUNOLOGIC NEGATIVE: 1
WEAKNESS: 0
POLYDIPSIA: 0
NEUROLOGICAL NEGATIVE: 1
NECK PAIN: 0
COUGH: 0

## 2024-03-18 NOTE — ASSESSMENT & PLAN NOTE
Consult GI for evaluation of enlarged liver and spleen.  Weight loss is advised. Target BMI: 25. Please follow low carbohydrate diet and exercise at least 150 minutes weekly.  Nutritional consultation is available, please let me know if interested.

## 2024-03-18 NOTE — PROGRESS NOTES
EDPD Note: Antibiotics Reviewed and Warranted    Contacted Ms. Pooja Burns regarding a positive urine culture/result that was taken during their recent emergency room visit. I completed education with patient. The patient is being treated appropriately with Keflex 500mg BID x 7 days. She had symptoms of LLQ & rectal bleeding with presence of pyuria in her urine, which has resolved since starting abx. Advised her to continue with regimen.    Susceptibility data from last 90 days.  Collected Specimen Info Organism Amoxicillin/Clavulanate Ampicillin Ampicillin/Sulbactam Cefazolin Cefazolin (uncomplicated UTIs only) Ceftazidime + Avibactam Ciprofloxacin Ertapenem Gentamicin Imipenem Meropenem   03/13/24 Urine from Clean Catch/Voided Klebsiella pneumoniae/variicola (1) S R S S S  S I S       Klebsiella pneumoniae/variicola (2) R R S S S S S R S R R     Collected Specimen Info Organism Meropenem + Vaborbactam Minocycline Nitrofurantoin Piperacillin/Tazobactam Tigecycline Trimethoprim/Sulfamethoxazole   03/13/24 Urine from Clean Catch/Voided Klebsiella pneumoniae/variicola (1)   I S  S     Klebsiella pneumoniae/variicola (2) S S I S S S        No further follow up needed from EDPD Team.     Katlyn Betancur, PharmD

## 2024-03-18 NOTE — PROGRESS NOTES
Subjective   Patient ID: Pooja Burns is a 68 y.o. female who presents for Follow-up (Patient is here Follow up on diabetes).  HPI    Patient has been feeling pretty good and has been complaint with prescribed medications.  C/o right great toe pain, no recent injury.    We reviewed and discussed details of recent blood work: CBC, CMP, Urine cultures.  Results within acceptable range except elevated lipase, ALT, positive urine cultures.    She went to ER on 3/13/24 with c/o abdominal pain and rectal bleeding.   CT abdomen and pelvis was done, no acute findings.   Urine cultures were positive for UTI, started on Cephalexin.    I reviewed available hospital ER  records and discharge orders. Discussed  patient's condition in details. I reviewed discharge medications, reconciled discharge medications and compared with outpatient medication list. All medications changes were discussed with patient in details. Current medication list was updated to reflect recent changes.     Patient will see GI for evaluation of enlarged liver, spleen and episodes of rectal bleeding.  Had colonoscopy in 2023, external and internal hemorrhoids were noted.    She underwent perfusion stress test in July 2023 which was normal.  We discussed results in details.  She gets SOB with exertion, most likely due to poor exercise tolerance and sedentary lifestyle.  I advised to start exercise program and gradually increase level of exertion. Advised to diony heart rate to at least 120 bpm when exercising.     Review of Systems   Constitutional: Negative.  Negative for activity change, appetite change and unexpected weight change.   HENT: Negative.  Negative for congestion, ear discharge, ear pain, hearing loss, mouth sores, nosebleeds, sinus pressure, sinus pain, sore throat, trouble swallowing and voice change.    Eyes: Negative.  Negative for pain, discharge, redness and visual disturbance.   Respiratory: Negative.  Negative for cough, chest  "tightness, shortness of breath, wheezing and stridor.    Cardiovascular: Negative.  Negative for chest pain, palpitations and leg swelling.   Gastrointestinal:  Positive for abdominal pain and anal bleeding. Negative for blood in stool, constipation, diarrhea, nausea and vomiting.   Endocrine: Negative.  Negative for polydipsia, polyphagia and polyuria.   Genitourinary: Negative.  Negative for difficulty urinating, dysuria, flank pain, frequency and urgency.   Musculoskeletal: Negative.  Negative for arthralgias, back pain, gait problem, joint swelling, myalgias, neck pain and neck stiffness.   Skin: Negative.  Negative for color change, rash and wound.   Allergic/Immunologic: Negative.  Negative for environmental allergies, food allergies and immunocompromised state.   Neurological: Negative.  Negative for dizziness, tremors, seizures, syncope, facial asymmetry, speech difficulty, weakness, light-headedness, numbness and headaches.   Hematological: Negative.  Negative for adenopathy. Does not bruise/bleed easily.   Psychiatric/Behavioral: Negative.  Negative for agitation, behavioral problems, confusion, hallucinations, sleep disturbance and suicidal ideas. The patient is not nervous/anxious.    All other systems reviewed and are negative.    Objective     Review of systems was performed and all systems were negative except what in HPI    /70 (BP Location: Left arm, Patient Position: Sitting, BP Cuff Size: Adult)   Pulse 80   Temp 36.4 °C (97.5 °F) (Temporal)   Resp 16   Ht 1.549 m (5' 1\")   Wt 67.6 kg (149 lb)   SpO2 97%   BMI 28.15 kg/m²    Physical Exam  Vitals and nursing note reviewed.   Constitutional:       General: She is not in acute distress.     Appearance: Normal appearance.   HENT:      Head: Normocephalic and atraumatic.      Right Ear: External ear normal.      Left Ear: External ear normal.      Nose: Nose normal. No congestion or rhinorrhea.   Eyes:      General:         Right eye: No " discharge.         Left eye: No discharge.      Extraocular Movements: Extraocular movements intact.      Conjunctiva/sclera: Conjunctivae normal.      Pupils: Pupils are equal, round, and reactive to light.   Cardiovascular:      Rate and Rhythm: Normal rate and regular rhythm.      Pulses: Normal pulses.      Heart sounds: Normal heart sounds. No murmur heard.     No friction rub. No gallop.   Pulmonary:      Effort: Pulmonary effort is normal. No respiratory distress.      Breath sounds: Normal breath sounds. No stridor. No wheezing, rhonchi or rales.   Chest:      Chest wall: No tenderness.   Abdominal:      General: Bowel sounds are normal.      Palpations: Abdomen is soft. There is no mass.      Tenderness: There is no abdominal tenderness. There is no guarding or rebound.   Musculoskeletal:         General: No swelling or deformity. Normal range of motion.      Cervical back: Normal range of motion and neck supple. No rigidity or tenderness.      Right lower leg: No edema.      Left lower leg: No edema.   Lymphadenopathy:      Cervical: No cervical adenopathy.   Skin:     General: Skin is warm and dry.      Coloration: Skin is not jaundiced.      Findings: No bruising or erythema.   Neurological:      General: No focal deficit present.      Mental Status: She is alert and oriented to person, place, and time. Mental status is at baseline.      Cranial Nerves: No cranial nerve deficit.      Motor: No weakness.      Coordination: Coordination normal.      Gait: Gait normal.   Psychiatric:         Mood and Affect: Mood normal.         Behavior: Behavior normal.         Thought Content: Thought content normal.         Judgment: Judgment normal.         Assessment/Plan   Problem List Items Addressed This Visit             ICD-10-CM       Cardiac and Vasculature    Essential hypertension I10     Controlled. Continue current medication.          Hyperlipidemia E78.5     Clinically stable. Continue statin.          Abnormality of thoracic aorta Q25.40     Clinically stable, will monitor.             Endocrine/Metabolic    Diabetes mellitus type 2 without retinopathy (CMS/HCC) E11.9    Relevant Medications    metFORMIN XR (Glucophage-XR) 500 mg 24 hr tablet    Type 2 diabetes mellitus (CMS/HCC) E11.9     Clinically stable. Continue current treatment.             Gastrointestinal and Abdominal    Hepatomegaly R16.0     Consult GI.         Steatosis of liver K76.0     Consult GI for evaluation of enlarged liver and spleen.  Weight loss is advised. Target BMI: 25. Please follow low carbohydrate diet and exercise at least 150 minutes weekly.  Nutritional consultation is available, please let me know if interested.          Elevated lipase R74.8     Will monitor.          Relevant Orders    Lipase    Hemorrhoids K64.9     Avoid constipation. Follow up with GI.             Genitourinary and Reproductive    UTI (urinary tract infection) N39.0    Relevant Orders    Urine Culture    Urinalysis with Reflex Microscopic       Musculoskeletal and Injuries    Great toe pain, right - Primary M79.674    Relevant Orders    Referral to Podiatry     Other Visit Diagnoses         Codes    Breast cancer screening by mammogram     Z12.31    Relevant Orders    BI mammo bilateral screening tomosynthesis        It was a pleasure to see you today.  I would like to remind you about importance of a healthy lifestyle in order to improve your well-being and live longer.  Try to engage in physical activities for at least 150 minutes per week.  Eat about 10 servings of fruits and vegetables daily. My advice is 2 servings of fruits and 8 servings of vegetables.  For vegetables choose at least half of them green and at least half of them fresh.  Please avoid sugar, salt, fried food and saturated fat.      I spent a total of 40 minutes on the date of service for follow up visit, which included preparing to see the patient, face-to-face patient care, completing  clinical documentation, obtaining and/or reviewing separately obtained history, performing a medically appropriate examination, counseling and educating the patient/family/caregiver, ordering medications, tests, or procedures, communicating with other health care providers (not separately reported), independently interpreting results (not separately reported), communicating results to the patient/family/caregiver, and care coordination (not separately reported).    F/up in 3 months or sooner if needed.

## 2024-03-21 ENCOUNTER — PHARMACY VISIT (OUTPATIENT)
Dept: PHARMACY | Facility: CLINIC | Age: 69
End: 2024-03-21
Payer: COMMERCIAL

## 2024-03-29 ENCOUNTER — LAB (OUTPATIENT)
Dept: LAB | Facility: LAB | Age: 69
End: 2024-03-29
Payer: COMMERCIAL

## 2024-03-29 DIAGNOSIS — N39.0 URINARY TRACT INFECTION WITHOUT HEMATURIA, SITE UNSPECIFIED: ICD-10-CM

## 2024-03-29 DIAGNOSIS — Z00.00 HEALTHCARE MAINTENANCE: ICD-10-CM

## 2024-03-29 DIAGNOSIS — R74.8 ELEVATED LIPASE: ICD-10-CM

## 2024-03-29 LAB
BACTERIA #/AREA URNS AUTO: ABNORMAL /HPF
LIPASE SERPL-CCNC: 87 U/L (ref 9–82)
MUCOUS THREADS #/AREA URNS AUTO: ABNORMAL /LPF
RBC #/AREA URNS AUTO: ABNORMAL /HPF
SQUAMOUS #/AREA URNS AUTO: ABNORMAL /HPF
WBC #/AREA URNS AUTO: ABNORMAL /HPF

## 2024-03-29 PROCEDURE — 36415 COLL VENOUS BLD VENIPUNCTURE: CPT

## 2024-03-29 PROCEDURE — 82570 ASSAY OF URINE CREATININE: CPT

## 2024-03-29 PROCEDURE — 82043 UR ALBUMIN QUANTITATIVE: CPT

## 2024-03-29 PROCEDURE — 81001 URINALYSIS AUTO W/SCOPE: CPT

## 2024-03-29 PROCEDURE — 83690 ASSAY OF LIPASE: CPT

## 2024-03-30 LAB
APPEARANCE UR: CLEAR
BILIRUB UR STRIP.AUTO-MCNC: NEGATIVE MG/DL
COLOR UR: ABNORMAL
CREAT UR-MCNC: 109.3 MG/DL (ref 20–320)
GLUCOSE UR STRIP.AUTO-MCNC: ABNORMAL MG/DL
KETONES UR STRIP.AUTO-MCNC: NEGATIVE MG/DL
LEUKOCYTE ESTERASE UR QL STRIP.AUTO: ABNORMAL
MICROALBUMIN UR-MCNC: 13.2 MG/L
MICROALBUMIN/CREAT UR: 12.1 UG/MG CREAT
NITRITE UR QL STRIP.AUTO: ABNORMAL
PH UR STRIP.AUTO: 5 [PH]
PROT UR STRIP.AUTO-MCNC: NEGATIVE MG/DL
RBC # UR STRIP.AUTO: NEGATIVE /UL
SP GR UR STRIP.AUTO: 1.01
UROBILINOGEN UR STRIP.AUTO-MCNC: NORMAL MG/DL

## 2024-04-05 ENCOUNTER — TELEPHONE (OUTPATIENT)
Dept: PRIMARY CARE | Facility: CLINIC | Age: 69
End: 2024-04-05
Payer: COMMERCIAL

## 2024-04-05 ENCOUNTER — PHARMACY VISIT (OUTPATIENT)
Dept: PHARMACY | Facility: CLINIC | Age: 69
End: 2024-04-05
Payer: COMMERCIAL

## 2024-04-05 DIAGNOSIS — N39.0 URINARY TRACT INFECTION WITHOUT HEMATURIA, SITE UNSPECIFIED: Primary | ICD-10-CM

## 2024-04-05 PROCEDURE — RXMED WILLOW AMBULATORY MEDICATION CHARGE

## 2024-04-05 RX ORDER — CEPHALEXIN 500 MG/1
500 CAPSULE ORAL 3 TIMES DAILY
Qty: 21 CAPSULE | Refills: 0 | Status: SHIPPED | OUTPATIENT
Start: 2024-04-05 | End: 2024-04-12

## 2024-04-05 NOTE — TELEPHONE ENCOUNTER
Patient states you left a message on her MyChart 3/29 saying that her Urine culture showed that she had a mild UTI. Patient still having symptoms and would like to know if you could prescribe an antibiotic.     Uses [facility] pharmacy.     Please advise

## 2024-04-09 LAB
ATRIAL RATE: 68 BPM
P AXIS: 20 DEGREES
P OFFSET: 184 MS
P ONSET: 138 MS
PR INTERVAL: 146 MS
Q ONSET: 211 MS
QRS COUNT: 11 BEATS
QRS DURATION: 90 MS
QT INTERVAL: 416 MS
QTC CALCULATION(BAZETT): 442 MS
QTC FREDERICIA: 434 MS
R AXIS: -18 DEGREES
T AXIS: 27 DEGREES
T OFFSET: 419 MS
VENTRICULAR RATE: 68 BPM

## 2024-04-10 ENCOUNTER — PHARMACY VISIT (OUTPATIENT)
Dept: PHARMACY | Facility: CLINIC | Age: 69
End: 2024-04-10
Payer: COMMERCIAL

## 2024-05-14 DIAGNOSIS — I10 ESSENTIAL HYPERTENSION: ICD-10-CM

## 2024-05-14 PROCEDURE — RXMED WILLOW AMBULATORY MEDICATION CHARGE

## 2024-05-14 RX ORDER — POTASSIUM CHLORIDE 750 MG/1
10 TABLET, FILM COATED, EXTENDED RELEASE ORAL DAILY
Qty: 90 TABLET | Refills: 3 | Status: SHIPPED | OUTPATIENT
Start: 2024-05-14 | End: 2025-05-14

## 2024-05-14 RX ORDER — HYDROCHLOROTHIAZIDE 25 MG/1
25 TABLET ORAL DAILY
Qty: 90 TABLET | Refills: 3 | Status: SHIPPED | OUTPATIENT
Start: 2024-05-14

## 2024-05-17 ENCOUNTER — PHARMACY VISIT (OUTPATIENT)
Dept: PHARMACY | Facility: CLINIC | Age: 69
End: 2024-05-17
Payer: COMMERCIAL

## 2024-05-24 ENCOUNTER — HOSPITAL ENCOUNTER (OUTPATIENT)
Dept: RADIOLOGY | Facility: CLINIC | Age: 69
End: 2024-05-24
Payer: COMMERCIAL

## 2024-05-29 ENCOUNTER — APPOINTMENT (OUTPATIENT)
Dept: PODIATRY | Facility: CLINIC | Age: 69
End: 2024-05-29
Payer: COMMERCIAL

## 2024-05-29 DIAGNOSIS — J30.2 SEASONAL ALLERGIES: ICD-10-CM

## 2024-05-29 PROCEDURE — RXMED WILLOW AMBULATORY MEDICATION CHARGE

## 2024-05-29 RX ORDER — AZELASTINE 1 MG/ML
2 SPRAY, METERED NASAL NIGHTLY
Qty: 30 ML | Refills: 3 | Status: SHIPPED | OUTPATIENT
Start: 2024-05-29 | End: 2025-05-29

## 2024-05-31 PROCEDURE — RXMED WILLOW AMBULATORY MEDICATION CHARGE

## 2024-06-01 ENCOUNTER — PHARMACY VISIT (OUTPATIENT)
Dept: PHARMACY | Facility: CLINIC | Age: 69
End: 2024-06-01
Payer: COMMERCIAL

## 2024-06-03 ENCOUNTER — PHARMACY VISIT (OUTPATIENT)
Dept: PHARMACY | Facility: CLINIC | Age: 69
End: 2024-06-03
Payer: COMMERCIAL

## 2024-06-06 ENCOUNTER — OFFICE VISIT (OUTPATIENT)
Dept: GASTROENTEROLOGY | Facility: HOSPITAL | Age: 69
End: 2024-06-06
Payer: COMMERCIAL

## 2024-06-06 VITALS
HEART RATE: 85 BPM | RESPIRATION RATE: 19 BRPM | BODY MASS INDEX: 28.76 KG/M2 | DIASTOLIC BLOOD PRESSURE: 75 MMHG | OXYGEN SATURATION: 96 % | TEMPERATURE: 97.6 F | SYSTOLIC BLOOD PRESSURE: 127 MMHG | WEIGHT: 152.2 LBS

## 2024-06-06 DIAGNOSIS — K64.9 HEMORRHOIDS, UNSPECIFIED HEMORRHOID TYPE: ICD-10-CM

## 2024-06-06 DIAGNOSIS — K58.9 IRRITABLE BOWEL SYNDROME, UNSPECIFIED TYPE: Primary | ICD-10-CM

## 2024-06-06 DIAGNOSIS — K76.0 HEPATIC STEATOSIS: ICD-10-CM

## 2024-06-06 DIAGNOSIS — I10 ESSENTIAL (PRIMARY) HYPERTENSION: ICD-10-CM

## 2024-06-06 PROCEDURE — 1159F MED LIST DOCD IN RCRD: CPT | Performed by: STUDENT IN AN ORGANIZED HEALTH CARE EDUCATION/TRAINING PROGRAM

## 2024-06-06 PROCEDURE — 3074F SYST BP LT 130 MM HG: CPT | Performed by: STUDENT IN AN ORGANIZED HEALTH CARE EDUCATION/TRAINING PROGRAM

## 2024-06-06 PROCEDURE — 99205 OFFICE O/P NEW HI 60 MIN: CPT | Performed by: STUDENT IN AN ORGANIZED HEALTH CARE EDUCATION/TRAINING PROGRAM

## 2024-06-06 PROCEDURE — 99215 OFFICE O/P EST HI 40 MIN: CPT | Performed by: STUDENT IN AN ORGANIZED HEALTH CARE EDUCATION/TRAINING PROGRAM

## 2024-06-06 PROCEDURE — 3061F NEG MICROALBUMINURIA REV: CPT | Performed by: STUDENT IN AN ORGANIZED HEALTH CARE EDUCATION/TRAINING PROGRAM

## 2024-06-06 PROCEDURE — 1126F AMNT PAIN NOTED NONE PRSNT: CPT | Performed by: STUDENT IN AN ORGANIZED HEALTH CARE EDUCATION/TRAINING PROGRAM

## 2024-06-06 PROCEDURE — RXMED WILLOW AMBULATORY MEDICATION CHARGE

## 2024-06-06 PROCEDURE — 4010F ACE/ARB THERAPY RXD/TAKEN: CPT | Performed by: STUDENT IN AN ORGANIZED HEALTH CARE EDUCATION/TRAINING PROGRAM

## 2024-06-06 PROCEDURE — 3078F DIAST BP <80 MM HG: CPT | Performed by: STUDENT IN AN ORGANIZED HEALTH CARE EDUCATION/TRAINING PROGRAM

## 2024-06-06 RX ORDER — LISINOPRIL 5 MG/1
5 TABLET ORAL DAILY
Qty: 90 TABLET | Refills: 3 | Status: SHIPPED | OUTPATIENT
Start: 2024-06-06 | End: 2025-06-06

## 2024-06-06 RX ORDER — HYDROCORTISONE 25 MG/G
CREAM TOPICAL 2 TIMES DAILY
Qty: 28 G | Refills: 1 | Status: SHIPPED | OUTPATIENT
Start: 2024-06-06 | End: 2024-07-06

## 2024-06-06 RX ORDER — HYOSCYAMINE SULFATE 0.125 MG
0.12 TABLET ORAL EVERY 6 HOURS PRN
Qty: 90 TABLET | Refills: 1 | Status: SHIPPED | OUTPATIENT
Start: 2024-06-06 | End: 2024-07-06

## 2024-06-06 ASSESSMENT — PAIN SCALES - GENERAL: PAINLEVEL: 0-NO PAIN

## 2024-06-06 NOTE — PATIENT INSTRUCTIONS
Thank you for seeing us in clinic today!     In summary, please do the followin.  Please get your bloodwork and ultrasound at your earliest convenience.   2. Try to eat a high fiber diet such as high-fiber whole grains include oatmeal, whole-wheat bread and pasta, bran cereals and barley; legumes include beans, peas and lentils, and fruits and vegetables. You can also add a fiber supplement.   3. Drink at least eight 8 ounce glasses of water a day.  4. Start metamucil 1 heaping teaspoon in 12 ounces of water daily.   5. You can also start miralax one packet or 17 grams daily.   6. You can use anusol twice a day as needed for bleeding hemorrhoids.   7. You can use levsin as needed for abdominal cramping. You can also try IBGard (OTC) for abdominal pain.    We will see you again in 3 months or sooner if needed.   If you have any questions or concerns, please call the office at [phone].

## 2024-06-06 NOTE — PROGRESS NOTES
"REASON FOR VISIT:  IBS symptoms    HPI:  Pooja Burns is a 69 y.o. female with a pmhx of HLD, HTN, DM2, FIDELINA who presents for IBS symptoms and hepatomegaly.     Today, her main concern is for \"IBS symptoms\" which have been occurring over about the past year. She tells me that she has been having intermittent diarrhea, about once weekly. Typically has small, skinny, solid bowel movements about every other day, but once a week has several larger loose bowel movements following a few small solid stools. She has regularly been having bleeding most noticeable on the toilet paper after the diarrheal episodes. She does occasionally note a LLQ achy pain, which does not seem to be related to/relieved by her bowel movements. She has not noticed relationship of her symptoms to any particular foods. Occasionally takes pepto bismol which helps. Denies bloating or excess flatulence. Denies nausea or vomiting. She does have GERD for which she takes PRN famotidine.     Med list notable for pepcid and metformin.     Labs notable for ALT 47 (3/2024), CBC normal.     CT a/p IVC 3/2024: Moderate fecal material is seen throughout the  colon without evidence for bowel obstruction. Colonic diverticulosis without distinct CT evidence for acute diverticulitis. Hepatosplenomegaly with hepatic steatosis.    CT a/p IVC 1/2023:   Apparent circumferential wall thickening of multiple fluid-filled  small bowel and colonic loops, nonspecific finding which can be seen with acute enterocolitis.  Distal colonic diverticulosis. No acute diverticulitis. Diffuse hepatic steatosis    Fam Hx: Uncle with colon cancer; unsure at what age.     ROS otherwise negative.     Prev endoscopic eval:   Colonoscopy 2/2023:  - Preparation of the colon was fair.                         - Stool in the rectum, in the sigmoid colon, in the                          transverse colon and in the ascending colon.                         - One 4 mm polyp in the transverse " colon, removed with                          a cold snare. Resected and retrieved.                         - Diverticulosis in the recto-sigmoid colon and in the                          sigmoid colon.                         - External and internal hemorrhoids.                         - The examination was otherwise normal.  Path: Nondiagnostic   Recall 2 years with extended bowel prep e  Colonoscopy 2020:  - One 4 mm polyp in the cecum, removed with a cold                          snare. Resected and retrieved.                         - One 5 mm polyp in the transverse colon, removed with                          a cold snare. Resected and retrieved.                         - One 8 mm polyp in the descending colon, removed with                          a cold snare. Resected and retrieved. Clip (MR                          conditional) was placed.                         - One 5 mm polyp in the sigmoid colon, removed with a                          cold snare. Resected and retrieved.                         - Three 3 to 5 mm polyps in the sigmoid colon, removed                          with a cold snare. Resected and retrieved.                         - One 6 mm polyp in the rectum, removed with a cold                          snare. Resected and retrieved. Clip (MR conditional)                          was placed.                         - Non-bleeding external and internal hemorrhoids.  Path: All polyps were TA's    REVIEW OF SYSTEMS  CONSTITUTIONAL: Negative for fevers  HEENT: Negative for frequent/significant headaches  RESPIRATORY: Negative for hemoptysis  CARDIOVASCULAR: Negative for chest pain  GI: See HPI  : Negative for hematuria  MUSCULOSKELETAL: Negative for arthralgia or myalgia  INTEGUMENTARY/SKIN: Negative for rash or skin lesion  HEMATOLOGY/LYMPHOLOGY: Negative for prolonged bleeding  ENDOCRINE: Negative for cold/heat intolerance  NEURO: Negative for encephalopathy  PSYCH: Negative for new changes  in mood or affect      Allergies   Allergen Reactions    Atorvastatin Myalgia    Lovastatin Myalgia    Dicyclomine Rash       Past Medical History:   Diagnosis Date    Benign paroxysmal vertigo, unspecified ear 03/22/2022    Benign paroxysmal positional vertigo    Calculus of gallbladder without cholecystitis without obstruction 11/14/2019    Gallstones    Chronic cough 10/18/2016    Chronic cough    Essential (primary) hypertension 03/12/2021    Hypertension    Essential (primary) hypertension 11/06/2022    Essential hypertension    Personal history of other diseases of the circulatory system 04/26/2022    History of essential hypertension    Personal history of other diseases of the digestive system 10/09/2018    History of cholelithiasis    Personal history of other diseases of the digestive system     History of hiatal hernia    Personal history of other diseases of the nervous system and sense organs     History of sleep apnea    Personal history of other diseases of the respiratory system 11/19/2018    History of acute sinusitis    Personal history of other endocrine, nutritional and metabolic disease     History of high cholesterol    Personal history of other endocrine, nutritional and metabolic disease     History of diabetes mellitus    Personal history of other specified conditions 04/26/2022    History of chest pain    Personal history of other specified conditions 11/21/2016    History of nausea    Type 2 diabetes mellitus without complications (Multi) 11/04/2022    Diabetes mellitus type 2 without retinopathy    Unspecified disorder of ear, unspecified ear 04/26/2022    Ear, nose and throat disorder    Urinary tract infection, site not specified 04/02/2018    Acute lower UTI       Past Surgical History:   Procedure Laterality Date    APPENDECTOMY  08/22/2013    Appendectomy    HYSTERECTOMY  08/22/2013    Hysterectomy    OTHER SURGICAL HISTORY  11/22/2016    Incisional Hernia Repair - Incarcerated     OTHER SURGICAL HISTORY  10/20/2015    Nephrectomy    RHINOPLASTY  08/22/2013    Rhinoplasty       Family History   Problem Relation Name Age of Onset    Heart attack Mother      Hypertension Mother      Other (acute myocardial infarction) Father      Heart disease Other         Social History     Tobacco Use    Smoking status: Never    Smokeless tobacco: Never   Substance Use Topics    Alcohol use: Never       Current Outpatient Medications   Medication Sig Dispense Refill    aspirin 81 mg EC tablet Take 1 tablet (81 mg) by mouth once daily.      azelastine (Astelin) 137 mcg (0.1 %) nasal spray ADMINISTER 2 SPRAYS INTO EACH NOSTRIL IN THE MORNING AND 2 SPRAYS BEFORE BEDTIME. 30 mL 3    blood sugar diagnostic strip Use 1 test strip to check blood sugar once daily or as directed 100 each 3    blood-glucose meter (Accu-Chek Guide Me Glucose Mtr) misc Use meter as directed daily. 1 each 0    famotidine (Pepcid) 20 mg tablet TAKE 1 TABLET (20 MG) BY MOUTH 2 TIMES A DAY. 180 tablet 3    hydroCHLOROthiazide (HYDRODiuril) 25 mg tablet TAKE 1 TABLET (25MG) BY MOUTH ONCE DAILY 90 tablet 3    lancets misc Use 1 lancet to check blood sugar once daily or as directed 102 each 3    lisinopril 5 mg tablet TAKE 1 TABLET (5 MG) BY MOUTH ONCE DAILY. 90 tablet 3    metFORMIN XR (Glucophage-XR) 500 mg 24 hr tablet TAKE 1 TABLET (500 MG) BY MOUTH 2 TIMES A DAY. 180 tablet 3    potassium chloride CR (Klor-Con) 10 mEq ER tablet Take 1 tablet (10 mEq) by mouth once daily. 90 tablet 3    rosuvastatin (Crestor) 5 mg tablet TAKE 1 TABLET (5 MG) BY MOUTH ONCE DAILY AT BEDTIME. 90 tablet 3     No current facility-administered medications for this visit.       PHYSICAL EXAM:  /75   Pulse 85   Temp 36.4 °C (97.6 °F)   Resp 19   Wt 69 kg (152 lb 3.2 oz)   SpO2 96%   BMI 28.76 kg/m²    Gen: aaox3, NAD  Head: Normocephalic, atraumatic  Eyes: Sclera anicteric, conjunctiva pink   Neck: Supple  Heart: RRR, no murmurs, rubs or gallops  Lungs:  CTAB, non-labored breathing  Abd: Soft, non-distended, non-tender, bowel sounds present, no rebound or guarding, no organomegaly  Ext: No LE edema b/l  Neuro: no gross focal deficits  Psych: Congruent mood and affect, appropriate insight and judgement  Skin: No jaundice      This SmartLink has not been configured with any valid records.    cbc  Lab Results   Component Value Date    ALT 47 (H) 03/13/2024    AST 28 03/13/2024    ALKPHOS 94 03/13/2024    BILITOT 0.6 03/13/2024       === 03/13/24 ===    CT ABDOMEN PELVIS W IV CONTRAST    - Impression -  Moderate fecal material is seen throughout the  colon without evidence  for bowel obstruction.  Colonic diverticulosis without distinct CT evidence for acute  diverticulitis.  Hepatosplenomegaly with hepatic steatosis.  Signed by Manish Knight MD      ASSESSMENT  68 yo female PMH HTN, HLD, DM2, FIDELINA, who presents to GI clinic to address diarrhea and hepatomegaly. Symptoms seem to be consistent with overflow diarrhea in the setting of constipation with high stool burden seen on recent CT A/P. Less likely to be consistent with mixed IBS. Hepatomegaly and steatosis is most likely due to MASLD given her metabolic risk factors.     PLAN  # IBS-C  -Recommend metamucil and miralax daily.   - She may also try IBgard.   -Documented allergy with hives in response to Bentyl, she may try levsin as needed for cramping if no relief with ibgard  -Educated on a high fiber diet as well as staying well-hydrated   - Ansuol for hemorrhoidal bleeding  - 2 day prep for future colonoscopies    #Hepatomegaly   -Repeat LFTs today as ALT was slightl elevated previously to 47  -Fibroscan ordered  -Hepatitis serologies negative in 2018; she will see her PCP to get HAV and HBV vaccines  -Counseled on weight loss, diabetes control, cholesterol control, and avoidance of alcohol.     RTC 3-4 months     HM:  Colonoscopy: 2/2023. Due in 2025 with a 2-day prep.   Hepatitis: HAV, HBV, HCV negative in  2018    Portia Youngblood MD  Internal Medicine PGY-2        Attending Note:   I have personally performed a face to face assessment of this Patient, which included an interview, physical exam and Assessment and Plan.  I have reviewed and confirmed the history and key findings as documented by the medical resident and edited as appropriate.     Signature: Maritza Enrique MD    Date: 6/6/2024  Time: 11:13 AM

## 2024-06-07 ENCOUNTER — HOSPITAL ENCOUNTER (OUTPATIENT)
Dept: RADIOLOGY | Facility: CLINIC | Age: 69
Discharge: HOME | End: 2024-06-07
Payer: COMMERCIAL

## 2024-06-07 VITALS — BODY MASS INDEX: 27.6 KG/M2 | WEIGHT: 150 LBS | HEIGHT: 62 IN

## 2024-06-07 DIAGNOSIS — Z12.31 BREAST CANCER SCREENING BY MAMMOGRAM: ICD-10-CM

## 2024-06-07 PROCEDURE — 77067 SCR MAMMO BI INCL CAD: CPT

## 2024-06-12 ENCOUNTER — PHARMACY VISIT (OUTPATIENT)
Dept: PHARMACY | Facility: CLINIC | Age: 69
End: 2024-06-12
Payer: COMMERCIAL

## 2024-06-21 ENCOUNTER — APPOINTMENT (OUTPATIENT)
Dept: PRIMARY CARE | Facility: CLINIC | Age: 69
End: 2024-06-21
Payer: COMMERCIAL

## 2024-06-26 ENCOUNTER — CLINICAL SUPPORT (OUTPATIENT)
Dept: GASTROENTEROLOGY | Facility: HOSPITAL | Age: 69
End: 2024-06-26
Payer: COMMERCIAL

## 2024-06-26 DIAGNOSIS — K76.0 HEPATIC STEATOSIS: ICD-10-CM

## 2024-06-28 ENCOUNTER — LAB (OUTPATIENT)
Dept: LAB | Facility: LAB | Age: 69
End: 2024-06-28
Payer: COMMERCIAL

## 2024-06-28 DIAGNOSIS — K76.0 HEPATIC STEATOSIS: ICD-10-CM

## 2024-06-28 PROCEDURE — 80076 HEPATIC FUNCTION PANEL: CPT

## 2024-06-28 PROCEDURE — 86803 HEPATITIS C AB TEST: CPT

## 2024-06-28 PROCEDURE — 36415 COLL VENOUS BLD VENIPUNCTURE: CPT

## 2024-06-29 LAB
ALBUMIN SERPL BCP-MCNC: 4.4 G/DL (ref 3.4–5)
ALP SERPL-CCNC: 84 U/L (ref 33–136)
ALT SERPL W P-5'-P-CCNC: 30 U/L (ref 7–45)
AST SERPL W P-5'-P-CCNC: 22 U/L (ref 9–39)
BILIRUB DIRECT SERPL-MCNC: 0.1 MG/DL (ref 0–0.3)
BILIRUB SERPL-MCNC: 0.5 MG/DL (ref 0–1.2)
HCV AB SER QL: NONREACTIVE
PROT SERPL-MCNC: 7.1 G/DL (ref 6.4–8.2)

## 2024-07-03 DIAGNOSIS — R10.13 ABDOMINAL PAIN, EPIGASTRIC: ICD-10-CM

## 2024-07-03 PROCEDURE — RXMED WILLOW AMBULATORY MEDICATION CHARGE

## 2024-07-03 RX ORDER — FAMOTIDINE 20 MG/1
TABLET, FILM COATED ORAL
Qty: 180 TABLET | Refills: 3 | Status: SHIPPED | OUTPATIENT
Start: 2024-07-03 | End: 2025-07-03

## 2024-07-03 RX ORDER — FAMOTIDINE 20 MG/1
TABLET, FILM COATED ORAL
Qty: 180 TABLET | Refills: 3 | Status: CANCELLED | OUTPATIENT
Start: 2024-07-03 | End: 2025-07-03

## 2024-07-09 ENCOUNTER — PHARMACY VISIT (OUTPATIENT)
Dept: PHARMACY | Facility: CLINIC | Age: 69
End: 2024-07-09
Payer: COMMERCIAL

## 2024-07-10 ENCOUNTER — TELEPHONE (OUTPATIENT)
Dept: PRIMARY CARE | Facility: CLINIC | Age: 69
End: 2024-07-10
Payer: COMMERCIAL

## 2024-07-10 ENCOUNTER — PHARMACY VISIT (OUTPATIENT)
Dept: PHARMACY | Facility: CLINIC | Age: 69
End: 2024-07-10
Payer: COMMERCIAL

## 2024-07-10 DIAGNOSIS — N39.0 URINARY TRACT INFECTION WITHOUT HEMATURIA, SITE UNSPECIFIED: Primary | ICD-10-CM

## 2024-07-10 PROCEDURE — RXMED WILLOW AMBULATORY MEDICATION CHARGE

## 2024-07-10 RX ORDER — NITROFURANTOIN 25; 75 MG/1; MG/1
100 CAPSULE ORAL 2 TIMES DAILY
Qty: 14 CAPSULE | Refills: 0 | Status: SHIPPED | OUTPATIENT
Start: 2024-07-10 | End: 2024-07-17

## 2024-07-15 ENCOUNTER — APPOINTMENT (OUTPATIENT)
Dept: PRIMARY CARE | Facility: CLINIC | Age: 69
End: 2024-07-15
Payer: COMMERCIAL

## 2024-07-15 VITALS
DIASTOLIC BLOOD PRESSURE: 60 MMHG | RESPIRATION RATE: 16 BRPM | BODY MASS INDEX: 28.58 KG/M2 | WEIGHT: 151.4 LBS | SYSTOLIC BLOOD PRESSURE: 100 MMHG | OXYGEN SATURATION: 97 % | TEMPERATURE: 98 F | HEIGHT: 61 IN | HEART RATE: 84 BPM

## 2024-07-15 DIAGNOSIS — R06.09 DYSPNEA ON EXERTION: ICD-10-CM

## 2024-07-15 DIAGNOSIS — E78.2 MIXED HYPERLIPIDEMIA: ICD-10-CM

## 2024-07-15 DIAGNOSIS — I10 ESSENTIAL HYPERTENSION: ICD-10-CM

## 2024-07-15 DIAGNOSIS — Z00.00 HEALTHCARE MAINTENANCE: ICD-10-CM

## 2024-07-15 DIAGNOSIS — N39.0 URINARY TRACT INFECTION WITHOUT HEMATURIA, SITE UNSPECIFIED: ICD-10-CM

## 2024-07-15 DIAGNOSIS — K76.0 STEATOSIS OF LIVER: ICD-10-CM

## 2024-07-15 DIAGNOSIS — E11.9 DIABETES MELLITUS TYPE 2 WITHOUT RETINOPATHY (MULTI): Primary | ICD-10-CM

## 2024-07-15 PROCEDURE — 1160F RVW MEDS BY RX/DR IN RCRD: CPT | Performed by: INTERNAL MEDICINE

## 2024-07-15 PROCEDURE — 3078F DIAST BP <80 MM HG: CPT | Performed by: INTERNAL MEDICINE

## 2024-07-15 PROCEDURE — 3074F SYST BP LT 130 MM HG: CPT | Performed by: INTERNAL MEDICINE

## 2024-07-15 PROCEDURE — 1159F MED LIST DOCD IN RCRD: CPT | Performed by: INTERNAL MEDICINE

## 2024-07-15 PROCEDURE — 4010F ACE/ARB THERAPY RXD/TAKEN: CPT | Performed by: INTERNAL MEDICINE

## 2024-07-15 PROCEDURE — 99214 OFFICE O/P EST MOD 30 MIN: CPT | Performed by: INTERNAL MEDICINE

## 2024-07-15 PROCEDURE — 1036F TOBACCO NON-USER: CPT | Performed by: INTERNAL MEDICINE

## 2024-07-15 PROCEDURE — 3061F NEG MICROALBUMINURIA REV: CPT | Performed by: INTERNAL MEDICINE

## 2024-07-15 ASSESSMENT — ENCOUNTER SYMPTOMS
APPETITE CHANGE: 0
NECK STIFFNESS: 0
ABDOMINAL PAIN: 0
EYES NEGATIVE: 1
EYE REDNESS: 0
HEADACHES: 0
BLOOD IN STOOL: 0
EYE DISCHARGE: 0
WOUND: 0
DIFFICULTY URINATING: 0
SORE THROAT: 0
MUSCULOSKELETAL NEGATIVE: 1
ENDOCRINE NEGATIVE: 1
SPEECH DIFFICULTY: 0
BRUISES/BLEEDS EASILY: 0
CONSTIPATION: 0
PSYCHIATRIC NEGATIVE: 1
LIGHT-HEADEDNESS: 0
PALPITATIONS: 0
SINUS PAIN: 0
GASTROINTESTINAL NEGATIVE: 1
NEUROLOGICAL NEGATIVE: 1
CONFUSION: 0
FREQUENCY: 0
NUMBNESS: 0
COUGH: 0
ARTHRALGIAS: 0
SLEEP DISTURBANCE: 0
DIZZINESS: 0
TROUBLE SWALLOWING: 0
SINUS PRESSURE: 0
COLOR CHANGE: 0
VOMITING: 0
WEAKNESS: 0
MYALGIAS: 0
FLANK PAIN: 0
DYSURIA: 0
POLYDIPSIA: 0
SHORTNESS OF BREATH: 0
EYE PAIN: 0
NAUSEA: 0
DIARRHEA: 0
TREMORS: 0
ALLERGIC/IMMUNOLOGIC NEGATIVE: 1
ADENOPATHY: 0
BACK PAIN: 0
VOICE CHANGE: 0
CARDIOVASCULAR NEGATIVE: 1
JOINT SWELLING: 0
CONSTITUTIONAL NEGATIVE: 1
WHEEZING: 0
NECK PAIN: 0
FACIAL ASYMMETRY: 0
HEMATOLOGIC/LYMPHATIC NEGATIVE: 1
UNEXPECTED WEIGHT CHANGE: 0
HALLUCINATIONS: 0
AGITATION: 0
CHEST TIGHTNESS: 0
NERVOUS/ANXIOUS: 0
ACTIVITY CHANGE: 0
STRIDOR: 0
POLYPHAGIA: 0
RESPIRATORY NEGATIVE: 1
SEIZURES: 0

## 2024-07-15 NOTE — PROGRESS NOTES
Subjective   Patient ID: Pooja Burns is a 69 y.o. female who presents for Follow-up (Pt is here due to a 3 month follow up).  HPI    Patient has been feeling pretty good and has been complaint with prescribed medications.    We reviewed and discussed details of blood work: CBC, CMP, TSH, Lipid panel, Hb A1c, Vit D done in 2023.  Results within acceptable range except elevated hbA1c: 8.5.    Last mammogram in June 2024 was negative.     Due to UTI she started Macrobid last week, still taking, symptoms improving.     Patient was diagnosed with fatty liver, saw GI, had Fiberscan done which showed steatosis and 0 fibrosis.    We discussed importance of weight loss.    Patient may be a candidate for GLP1-a, will obtain blood work, and if HbA1c still elevated will refer to  [facility] pharmacist for initiation of Ozempic or Mounjaro.    She went to ER on 3/13/24 with c/o abdominal pain and rectal bleeding.   CT abdomen and pelvis was done, no acute findings.   Urine cultures were positive for UTI, started on Cephalexin.    She underwent perfusion stress test in July 2023 which was normal.  She gets SOB with exertion, most likely due to poor exercise tolerance and sedentary lifestyle.  I advised to start exercise program and gradually increase level of exertion. Advised to diony heart rate to at least 120 bpm when exercising.        Review of Systems   Constitutional: Negative.  Negative for activity change, appetite change and unexpected weight change.   HENT: Negative.  Negative for congestion, ear discharge, ear pain, hearing loss, mouth sores, nosebleeds, sinus pressure, sinus pain, sore throat, trouble swallowing and voice change.    Eyes: Negative.  Negative for pain, discharge, redness and visual disturbance.   Respiratory: Negative.  Negative for cough, chest tightness, shortness of breath, wheezing and stridor.    Cardiovascular: Negative.  Negative for chest pain, palpitations and leg swelling.   Gastrointestinal:  "Negative.  Negative for abdominal pain, blood in stool, constipation, diarrhea, nausea and vomiting.   Endocrine: Negative.  Negative for polydipsia, polyphagia and polyuria.   Genitourinary: Negative.  Negative for difficulty urinating, dysuria, flank pain, frequency and urgency.   Musculoskeletal: Negative.  Negative for arthralgias, back pain, gait problem, joint swelling, myalgias, neck pain and neck stiffness.   Skin: Negative.  Negative for color change, rash and wound.   Allergic/Immunologic: Negative.  Negative for environmental allergies, food allergies and immunocompromised state.   Neurological: Negative.  Negative for dizziness, tremors, seizures, syncope, facial asymmetry, speech difficulty, weakness, light-headedness, numbness and headaches.   Hematological: Negative.  Negative for adenopathy. Does not bruise/bleed easily.   Psychiatric/Behavioral: Negative.  Negative for agitation, behavioral problems, confusion, hallucinations, sleep disturbance and suicidal ideas. The patient is not nervous/anxious.    All other systems reviewed and are negative.      Objective     Review of systems was performed and all systems were negative except what in HPI    /60 (BP Location: Left arm, Patient Position: Sitting, BP Cuff Size: Adult)   Pulse 84   Temp 36.7 °C (98 °F) (Temporal)   Resp 16   Ht 1.537 m (5' 0.5\")   Wt 68.7 kg (151 lb 6.4 oz)   SpO2 97%   BMI 29.08 kg/m²    Physical Exam  Vitals and nursing note reviewed.   Constitutional:       General: She is not in acute distress.     Appearance: Normal appearance.   HENT:      Head: Normocephalic and atraumatic.      Right Ear: External ear normal.      Left Ear: External ear normal.      Nose: Nose normal. No congestion or rhinorrhea.   Eyes:      General:         Right eye: No discharge.         Left eye: No discharge.      Extraocular Movements: Extraocular movements intact.      Conjunctiva/sclera: Conjunctivae normal.      Pupils: Pupils are " equal, round, and reactive to light.   Cardiovascular:      Rate and Rhythm: Normal rate and regular rhythm.      Pulses: Normal pulses.      Heart sounds: Normal heart sounds. No murmur heard.     No friction rub. No gallop.   Pulmonary:      Effort: Pulmonary effort is normal. No respiratory distress.      Breath sounds: Normal breath sounds. No stridor. No wheezing, rhonchi or rales.   Chest:      Chest wall: No tenderness.   Abdominal:      General: Bowel sounds are normal.      Palpations: Abdomen is soft. There is no mass.      Tenderness: There is no abdominal tenderness. There is no guarding or rebound.   Musculoskeletal:         General: No swelling or deformity. Normal range of motion.      Cervical back: Normal range of motion and neck supple. No rigidity or tenderness.      Right lower leg: No edema.      Left lower leg: No edema.   Lymphadenopathy:      Cervical: No cervical adenopathy.   Skin:     General: Skin is warm and dry.      Coloration: Skin is not jaundiced.      Findings: No bruising or erythema.   Neurological:      General: No focal deficit present.      Mental Status: She is alert and oriented to person, place, and time. Mental status is at baseline.      Cranial Nerves: No cranial nerve deficit.      Motor: No weakness.      Coordination: Coordination normal.      Gait: Gait normal.   Psychiatric:         Mood and Affect: Mood normal.         Behavior: Behavior normal.         Thought Content: Thought content normal.         Judgment: Judgment normal.         Assessment/Plan   Problem List Items Addressed This Visit             ICD-10-CM       Cardiac and Vasculature    Essential hypertension I10     Controlled. Continue current medication.          Hyperlipidemia E78.5     Clinically stable. Continue statin.         Dyspnea on exertion R06.09     Recent stress test: Normal (2023).  Please gradually increase your activities/exercise.            Endocrine/Metabolic    Diabetes mellitus  type 2 without retinopathy (Multi) - Primary E11.9     Clinically stable. Continue current medications.  Follow low carb diet         Relevant Orders    Albumin-Creatinine Ratio, Urine Random    Hemoglobin A1C       Gastrointestinal and Abdominal    Steatosis of liver K76.0     Consult GI for evaluation of enlarged liver and spleen.  Weight loss is advised. Target BMI: 25. Please follow low carbohydrate diet and exercise at least 150 minutes weekly.  Nutritional consultation is available, please let me know if interested.             Genitourinary and Reproductive    UTI (urinary tract infection) N39.0     Clinically stable. Continue treatment as directed.          Other Visit Diagnoses         Codes    Healthcare maintenance     Z00.00    Relevant Orders    CBC    Comprehensive Metabolic Panel    Lipid Panel    TSH with reflex to Free T4 if abnormal        It was a pleasure to see you today.  I would like to remind you about importance of a healthy lifestyle in order to improve your well-being and live longer.  Try to engage in physical activities for at least 150 minutes per week.  Eat about 10 servings of fruits and vegetables daily. My advice is 2 servings of fruits and 8 servings of vegetables.  For vegetables choose at least half of them green and at least half of them fresh.  Please avoid sugar, salt, fried food and saturated fat.    I spent a total of 30 minutes on the date of service for follow up visit, which included preparing to see the patient, face-to-face patient care, completing clinical documentation, obtaining and/or reviewing separately obtained history, performing a medically appropriate examination, counseling and educating the patient/family/caregiver, ordering medications, tests, or procedures, communicating with other health care providers (not separately reported), independently interpreting results (not separately reported), communicating results to the patient/family/caregiver, and care  coordination (not separately reported).    F/up in 3 months or sooner if needed.

## 2024-07-27 ENCOUNTER — LAB (OUTPATIENT)
Dept: LAB | Facility: LAB | Age: 69
End: 2024-07-27
Payer: COMMERCIAL

## 2024-07-27 DIAGNOSIS — Z00.00 HEALTHCARE MAINTENANCE: ICD-10-CM

## 2024-07-27 DIAGNOSIS — E11.9 DIABETES MELLITUS TYPE 2 WITHOUT RETINOPATHY (MULTI): Primary | ICD-10-CM

## 2024-07-27 DIAGNOSIS — E11.9 DIABETES MELLITUS TYPE 2 WITHOUT RETINOPATHY (MULTI): ICD-10-CM

## 2024-07-27 LAB
ALBUMIN SERPL BCP-MCNC: 4.3 G/DL (ref 3.4–5)
ALP SERPL-CCNC: 77 U/L (ref 33–136)
ALT SERPL W P-5'-P-CCNC: 36 U/L (ref 7–45)
ANION GAP SERPL CALC-SCNC: 15 MMOL/L (ref 10–20)
AST SERPL W P-5'-P-CCNC: 26 U/L (ref 9–39)
BILIRUB SERPL-MCNC: 0.5 MG/DL (ref 0–1.2)
BUN SERPL-MCNC: 17 MG/DL (ref 6–23)
CALCIUM SERPL-MCNC: 9.6 MG/DL (ref 8.6–10.6)
CHLORIDE SERPL-SCNC: 104 MMOL/L (ref 98–107)
CHOLEST SERPL-MCNC: 170 MG/DL (ref 0–199)
CHOLESTEROL/HDL RATIO: 3.1
CO2 SERPL-SCNC: 25 MMOL/L (ref 21–32)
CREAT SERPL-MCNC: 1.12 MG/DL (ref 0.5–1.05)
CREAT UR-MCNC: 134.2 MG/DL (ref 20–320)
EGFRCR SERPLBLD CKD-EPI 2021: 54 ML/MIN/1.73M*2
ERYTHROCYTE [DISTWIDTH] IN BLOOD BY AUTOMATED COUNT: 13.1 % (ref 11.5–14.5)
EST. AVERAGE GLUCOSE BLD GHB EST-MCNC: 209 MG/DL
GLUCOSE SERPL-MCNC: 166 MG/DL (ref 74–99)
HBA1C MFR BLD: 8.9 %
HCT VFR BLD AUTO: 45.8 % (ref 36–46)
HDLC SERPL-MCNC: 54.4 MG/DL
HGB BLD-MCNC: 14 G/DL (ref 12–16)
LDLC SERPL CALC-MCNC: 95 MG/DL
MCH RBC QN AUTO: 30.8 PG (ref 26–34)
MCHC RBC AUTO-ENTMCNC: 30.6 G/DL (ref 32–36)
MCV RBC AUTO: 101 FL (ref 80–100)
MICROALBUMIN UR-MCNC: 10.9 MG/L
MICROALBUMIN/CREAT UR: 8.1 UG/MG CREAT
NON HDL CHOLESTEROL: 116 MG/DL (ref 0–149)
NRBC BLD-RTO: 0.3 /100 WBCS (ref 0–0)
PLATELET # BLD AUTO: 231 X10*3/UL (ref 150–450)
POTASSIUM SERPL-SCNC: 4.3 MMOL/L (ref 3.5–5.3)
PROT SERPL-MCNC: 6.9 G/DL (ref 6.4–8.2)
RBC # BLD AUTO: 4.54 X10*6/UL (ref 4–5.2)
SODIUM SERPL-SCNC: 140 MMOL/L (ref 136–145)
TRIGL SERPL-MCNC: 103 MG/DL (ref 0–149)
TSH SERPL-ACNC: 1.98 MIU/L (ref 0.44–3.98)
VLDL: 21 MG/DL (ref 0–40)
WBC # BLD AUTO: 6.9 X10*3/UL (ref 4.4–11.3)

## 2024-07-27 PROCEDURE — 80061 LIPID PANEL: CPT

## 2024-07-27 PROCEDURE — 80053 COMPREHEN METABOLIC PANEL: CPT

## 2024-07-27 PROCEDURE — 82570 ASSAY OF URINE CREATININE: CPT

## 2024-07-27 PROCEDURE — 82043 UR ALBUMIN QUANTITATIVE: CPT

## 2024-07-27 PROCEDURE — 85027 COMPLETE CBC AUTOMATED: CPT

## 2024-07-27 PROCEDURE — 36415 COLL VENOUS BLD VENIPUNCTURE: CPT

## 2024-07-27 PROCEDURE — 83036 HEMOGLOBIN GLYCOSYLATED A1C: CPT

## 2024-07-27 PROCEDURE — 84443 ASSAY THYROID STIM HORMONE: CPT

## 2024-07-28 NOTE — RESULT ENCOUNTER NOTE
Your recent lab work showed highly elevated glucose and HbA1c.  You will be contacted by [facility] Pharmacist regarding treatment recommendations.    Rest of results were was acceptable. We will discuss details during your next office visit. Please keep your next appointment as scheduled. Dr. Thakkar

## 2024-08-08 ENCOUNTER — APPOINTMENT (OUTPATIENT)
Dept: PODIATRY | Facility: CLINIC | Age: 69
End: 2024-08-08
Payer: COMMERCIAL

## 2024-08-09 ENCOUNTER — APPOINTMENT (OUTPATIENT)
Dept: OPHTHALMOLOGY | Facility: CLINIC | Age: 69
End: 2024-08-09
Payer: COMMERCIAL

## 2024-08-16 PROCEDURE — RXMED WILLOW AMBULATORY MEDICATION CHARGE

## 2024-08-21 ENCOUNTER — PHARMACY VISIT (OUTPATIENT)
Dept: PHARMACY | Facility: CLINIC | Age: 69
End: 2024-08-21
Payer: COMMERCIAL

## 2024-08-30 ENCOUNTER — APPOINTMENT (OUTPATIENT)
Dept: PHARMACY | Facility: HOSPITAL | Age: 69
End: 2024-08-30
Payer: COMMERCIAL

## 2024-09-04 PROCEDURE — RXMED WILLOW AMBULATORY MEDICATION CHARGE

## 2024-09-07 ENCOUNTER — PHARMACY VISIT (OUTPATIENT)
Dept: PHARMACY | Facility: CLINIC | Age: 69
End: 2024-09-07
Payer: COMMERCIAL

## 2024-09-09 ENCOUNTER — APPOINTMENT (OUTPATIENT)
Dept: GASTROENTEROLOGY | Facility: CLINIC | Age: 69
End: 2024-09-09
Payer: COMMERCIAL

## 2024-09-17 ENCOUNTER — APPOINTMENT (OUTPATIENT)
Dept: PODIATRY | Facility: CLINIC | Age: 69
End: 2024-09-17
Payer: COMMERCIAL

## 2024-09-25 ENCOUNTER — TELEPHONE (OUTPATIENT)
Dept: PRIMARY CARE | Facility: CLINIC | Age: 69
End: 2024-09-25
Payer: COMMERCIAL

## 2024-09-25 DIAGNOSIS — E11.9 DIABETES MELLITUS TYPE 2 WITHOUT RETINOPATHY (MULTI): Primary | ICD-10-CM

## 2024-09-25 PROCEDURE — RXMED WILLOW AMBULATORY MEDICATION CHARGE

## 2024-09-25 RX ORDER — DEXTROSE 4 G
1 TABLET,CHEWABLE ORAL DAILY
Qty: 1 EACH | Refills: 0 | Status: CANCELLED | OUTPATIENT
Start: 2024-09-25

## 2024-09-25 RX ORDER — DEXTROSE 4 G
1 TABLET,CHEWABLE ORAL DAILY
Qty: 1 EACH | Refills: 0 | Status: SHIPPED | OUTPATIENT
Start: 2024-09-25

## 2024-09-26 DIAGNOSIS — E11.9 DIABETES MELLITUS TYPE 2 WITHOUT RETINOPATHY (MULTI): Primary | ICD-10-CM

## 2024-09-26 PROCEDURE — RXMED WILLOW AMBULATORY MEDICATION CHARGE

## 2024-09-26 RX ORDER — LANCETS
EACH MISCELLANEOUS
Qty: 100 EACH | Refills: 2 | Status: SHIPPED | OUTPATIENT
Start: 2024-09-26

## 2024-09-30 ENCOUNTER — PHARMACY VISIT (OUTPATIENT)
Dept: PHARMACY | Facility: CLINIC | Age: 69
End: 2024-09-30
Payer: COMMERCIAL

## 2024-10-03 ENCOUNTER — APPOINTMENT (OUTPATIENT)
Dept: PODIATRY | Facility: CLINIC | Age: 69
End: 2024-10-03
Payer: COMMERCIAL

## 2024-10-03 DIAGNOSIS — E11.9 TYPE 2 DIABETES MELLITUS WITHOUT COMPLICATION, WITHOUT LONG-TERM CURRENT USE OF INSULIN (MULTI): ICD-10-CM

## 2024-10-03 DIAGNOSIS — M20.5X1 HALLUX LIMITUS OF RIGHT FOOT: Primary | ICD-10-CM

## 2024-10-03 PROCEDURE — 3061F NEG MICROALBUMINURIA REV: CPT | Performed by: PODIATRIST

## 2024-10-03 PROCEDURE — 3048F LDL-C <100 MG/DL: CPT | Performed by: PODIATRIST

## 2024-10-03 PROCEDURE — 99203 OFFICE O/P NEW LOW 30 MIN: CPT | Performed by: PODIATRIST

## 2024-10-03 PROCEDURE — 1159F MED LIST DOCD IN RCRD: CPT | Performed by: PODIATRIST

## 2024-10-03 PROCEDURE — 4010F ACE/ARB THERAPY RXD/TAKEN: CPT | Performed by: PODIATRIST

## 2024-10-03 PROCEDURE — 3052F HG A1C>EQUAL 8.0%<EQUAL 9.0%: CPT | Performed by: PODIATRIST

## 2024-10-03 NOTE — PROGRESS NOTES
History Of Present Illness  Pooja Burns is a 69 y.o. female presenting with chief complaint of: transition of care: patient complains of right great toe pain. History of fracture of the toe.  Fractured toe 30 years ago.  It was her distal phalanx.  Patient also has diabetes.  Her blood sugars have been a little bit high.  Hemoglobin A1c is just under 8  Patient works as a .  She has a sitting job.  She wears comfortable shoes at home.    PCP Felicia Haq PhD  Last visit 7/15/24     Past Medical History  She has a past medical history of Benign paroxysmal vertigo, unspecified ear (03/22/2022), Calculus of gallbladder without cholecystitis without obstruction (11/14/2019), Chronic cough (10/18/2016), Essential (primary) hypertension (03/12/2021), Essential (primary) hypertension (11/06/2022), Personal history of other diseases of the circulatory system (04/26/2022), Personal history of other diseases of the digestive system (10/09/2018), Personal history of other diseases of the digestive system, Personal history of other diseases of the nervous system and sense organs, Personal history of other diseases of the respiratory system (11/19/2018), Personal history of other endocrine, nutritional and metabolic disease, Personal history of other endocrine, nutritional and metabolic disease, Personal history of other specified conditions (04/26/2022), Personal history of other specified conditions (11/21/2016), Type 2 diabetes mellitus without complications (Multi) (11/04/2022), Unspecified disorder of ear, unspecified ear (04/26/2022), and Urinary tract infection, site not specified (04/02/2018).    Surgical History  She has a past surgical history that includes Appendectomy (08/22/2013); Rhinoplasty (08/22/2013); Hysterectomy (08/22/2013); Other surgical history (11/22/2016); and Other surgical history (10/20/2015).     Social History  She reports that she has never smoked. She has been exposed to  tobacco smoke. She has never used smokeless tobacco. She reports that she does not drink alcohol and does not use drugs.    Family History  Family History   Problem Relation Name Age of Onset    Heart attack Mother      Hypertension Mother      Other (acute myocardial infarction) Father      Heart disease Other      Crohn's disease Maternal Cousin          Allergies  Atorvastatin, Lovastatin, and Dicyclomine    Medications  Current Outpatient Medications   Medication Sig Dispense Refill    aspirin 81 mg EC tablet Take 1 tablet (81 mg) by mouth once daily.      azelastine (Astelin) 137 mcg (0.1 %) nasal spray ADMINISTER 2 SPRAYS INTO EACH NOSTRIL IN THE MORNING AND 2 SPRAYS BEFORE BEDTIME. 30 mL 3    blood sugar diagnostic strip Use 1 test strip to check blood sugar once daily or as directed 100 each 3    blood-glucose meter (Accu-Chek Guide Me Glucose Mtr) misc Use meter as directed daily. 1 each 0    famotidine (Pepcid) 20 mg tablet TAKE 1 TABLET (20 MG) BY MOUTH 2 TIMES A DAY. 180 tablet 3    hydroCHLOROthiazide (HYDRODiuril) 25 mg tablet TAKE 1 TABLET (25MG) BY MOUTH ONCE DAILY 90 tablet 3    lancets misc Use once daily whilte testing glucose level. 100 each 2    lisinopril 5 mg tablet Take 1 tablet (5 mg) by mouth once daily. 90 tablet 3    metFORMIN XR (Glucophage-XR) 500 mg 24 hr tablet TAKE 1 TABLET (500 MG) BY MOUTH 2 TIMES A DAY. 180 tablet 3    potassium chloride CR (Klor-Con) 10 mEq ER tablet Take 1 tablet (10 mEq) by mouth once daily. 90 tablet 3    rosuvastatin (Crestor) 5 mg tablet TAKE 1 TABLET (5 MG) BY MOUTH ONCE DAILY AT BEDTIME. 90 tablet 3    hydrocortisone (Anusol-HC) 2.5 % rectal cream Insert into the rectum 2 times a day. 28 g 1    hyoscyamine (Anaspaz, Levsin) 0.125 mg tablet Take 1 tablet (0.125 mg) by mouth every 6 hours if needed for cramping or diarrhea. 90 tablet 1    lancets misc Use 1 lancet to check blood sugar once daily or as directed 102 each 3     No current facility-administered  medications for this visit.       Review of Systems    REVIEW OF SYSTEMS  GENERAL:  Negative for malaise, significant weight loss, fever  CARDIOVASCULAR: leg swelling   MUSCULOSKELETAL:  Negative for joint pain or swelling, back pain, and muscle pain.  SKIN:  Negative for lesions, rash, and itching  PSYCH:  Negative for sleep disturbance, mood disorder and recent psychosocial stressors  NEURO: Negative, denies any burning, tingling or numbness     Objective:   Vasc: DP and PT pulses are palpable bilateral.  CFT is less than 3 seconds bilateral.  Skin temperature is warm to cool proximal to distal bilateral.      Neuro:  Light touch is intact to the foot bilateral.  Protective sensation is intact to the foot when tested with the 5.07 SWM bilateral.  There is no clonus noted.  The hallux is downgoing bilateral.      Derm: Nails are normal. Skin is supple with normal texture and turgor noted.  Webspaces are clean, dry and intact bilateral.  There are no hyperkeratoses, ulcerations, verruca or other lesions noted.      Ortho: Muscle strength is 5/5 for all pedal groups tested.  Patient has mild bunion deformity bilateral.  She has dorsal spurring on the right first MPJ with decreased range of motion and compared to the left first MPJ.  There is no crepitus that is appreciated.  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  Since patient's pain is mild, we will start off with a topical analgesic and wider shoes.  If pain increases we could consider injection and/or x-ray to discuss surgery.  Patient does not demonstrate signs of diabetes in her feet.  She is at no additional risk for foot ulceration at this time.

## 2024-10-04 PROCEDURE — RXMED WILLOW AMBULATORY MEDICATION CHARGE

## 2024-10-09 ENCOUNTER — PHARMACY VISIT (OUTPATIENT)
Dept: PHARMACY | Facility: CLINIC | Age: 69
End: 2024-10-09
Payer: COMMERCIAL

## 2024-10-10 NOTE — PROGRESS NOTES
Clinical Pharmacy Appointment    Patient ID: Pooja Burns is a 69 y.o. female who presents for Diabetes.    Pt is here for First appointment.     Referring Provider: Felicia Valero M*  PCP: Felicia Valero MD PhD   Last visit with PCP: 07/15/2024   Next visit with PCP: 10/15/2024      Subjective     Patient will be retiring at the end of October and will no longer be on  insurance.     HPI  DIABETES MELLITUS TYPE 2:    Diagnosed with diabetes: ~10 years. Known diabetic complications: none.  Does patient follow with Endocrinology: No  Last optometry exam: ~1 year ago, couldn't get in for annual this year but will wait until she gets her new insurance  Most recent visit in Podiatry: 10/03/2024 -- patient denies sores or cuts on feet today      Current diabetic medications include:  Metformin  mg once daily     Historical diabetic medications:   Jardiance (discontinued after 1 week due to lightheadedness)  Glipizide (discontinued due to hypoglycemia at the time)  Trulicity (was prescribed but did not start due to not being comfortable with injections and the side effects)  Metformin at 4 tablets daily (states blood sugars were too low)    Adverse Effects: notes a dry cough that she has had for a while, thinks it may be allergy related     Glucose Readings:  Glucometer/CGM Type: Accu Chek Guide Glucometer  Patient tests BG 1 time daily fasting in the morning    Current home BG readings: fasting typically in the 140s-180s     Any episodes of hypoglycemia? No, denies signs/symptoms of hypoglycemia .    Does pt have proteinuria? No, UACR 8.1 ug/mg crt 07/27/24    Lifestyle:  Diet: 3 meals/day.   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 Yes, Rosuvastatin 5 mg   ACE-I/ARB? Yes, Lisinopril 5 mg   Aspirin? Yes, 81 mg daily     Pertinent PMH Review:  PMH of Pancreatitis: No  PMH of Retinopathy: No  PMH of Urinary Tract Infections: Yes - a few throughout her life, none recently most recent was last year   PMH of MTC: No    Drug Interactions  No relevant drug interactions were noted.    Medication System Management  Adherence/Organization: reports never missing a dose of her medications (may occasionally miss her aspirin); does use a pill box to help with organization   Affordability/Accessibility: no issues reported     Patient's Preferred Pharmacy    [facility]  [address], [address]  [place] [id number]  Phone: [phone] Fax: [phone]    [facility] [place] [facility] to [facility]  [place] [address]  Phone: [phone] Fax: [phone]       Objective   Allergies   Allergen Reactions    Atorvastatin Myalgia    Lovastatin Myalgia    Dicyclomine Rash     Social History     Social History Narrative    Not on file      Medication Reconciliation:  Discontinued:   Hyoscyamine 0.125 mg (patient reports she never took)    Medication Review  Current Outpatient Medications   Medication Instructions    aspirin 81 mg EC tablet 1 tablet, oral, Daily    azelastine (Astelin) 137 mcg (0.1 %) nasal spray ADMINISTER 2 SPRAYS INTO EACH NOSTRIL IN THE MORNING AND 2 SPRAYS BEFORE BEDTIME.    blood sugar diagnostic strip Use 1 test strip to check blood sugar once daily or as directed    blood-glucose  "meter (Accu-Chek Guide Me Glucose Mtr) misc Use meter as directed daily.    famotidine (Pepcid) 20 mg tablet TAKE 1 TABLET (20 MG) BY MOUTH 2 TIMES A DAY.    hydroCHLOROthiazide (HYDRODiuril) 25 mg tablet TAKE 1 TABLET (25MG) BY MOUTH ONCE DAILY    lancets misc Use once daily whilte testing glucose level.    lisinopril 5 mg, oral, Daily    metFORMIN XR (GLUCOPHAGE-XR) 1,000 mg, oral, 2 times daily, Take with meals    potassium chloride CR (Klor-Con) 10 mEq ER tablet 10 mEq, oral, Daily    rosuvastatin (Crestor) 5 mg tablet TAKE 1 TABLET (5 MG) BY MOUTH ONCE DAILY AT BEDTIME.      Vitals  BP Readings from Last 2 Encounters:   07/15/24 100/60   06/06/24 127/75     BMI Readings from Last 1 Encounters:   07/15/24 29.08 kg/m²      Labs  A1C  Lab Results   Component Value Date    HGBA1C 8.9 (H) 07/27/2024    HGBA1C 7.9 (A) 09/27/2023    HGBA1C 8.5 (A) 07/08/2023     BMP  Lab Results   Component Value Date    CALCIUM 9.6 07/27/2024     07/27/2024    K 4.3 07/27/2024    CO2 25 07/27/2024     07/27/2024    BUN 17 07/27/2024    CREATININE 1.12 (H) 07/27/2024    EGFR 54 (L) 07/27/2024     LFTs  Lab Results   Component Value Date    ALT 36 07/27/2024    AST 26 07/27/2024    ALKPHOS 77 07/27/2024    BILITOT 0.5 07/27/2024     FLP  Lab Results   Component Value Date    TRIG 103 07/27/2024    CHOL 170 07/27/2024    LDLF 120 (H) 07/08/2023    LDLCALC 95 07/27/2024    HDL 54.4 07/27/2024     Urine Microalbumin  Lab Results   Component Value Date    MICROALBCREA 8.1 07/27/2024     Weight Management  Wt Readings from Last 3 Encounters:   07/15/24 68.7 kg (151 lb 6.4 oz)   06/07/24 68 kg (150 lb)   06/06/24 69 kg (152 lb 3.2 oz)      Estimated body mass index is 29.08 kg/m² as calculated from the following:    Height as of 7/15/24: 1.537 m (5' 0.5\").    Weight as of 7/15/24: 68.7 kg (151 lb 6.4 oz).     Assessment/Plan   Problem List Items Addressed This Visit       Diabetes mellitus type 2 without retinopathy (Multi) - " Primary     Patient's goal A1c is < 7%.  Is pt at goal? No, most recent A1c was 8.9% on 07/27/24 which had increased from 7.9% on 09/27/23.  Patient's SMBGs are typically in the 140s-180s fasting.     Rationale for plan:   Patient is hesitant to start new medications and is concerned with side effects of GLP-1 agonists and SGLT2 inhibitors  She has sedentary job and is retiring at the end of the month - is hoping she can work on improving her diet and increasing activity once she retires  Is agreeable to increasing her Metformin now - is due for an updated A1c at the end of the month so we will see where the updated value is then and discuss next steps. She was provided with the names of medication options and she will look into them.     Medication Changes:  INCREASE  Metformin  mg 2 tablets by mouth in the morning and 1 tablet in the evening for 7 days then increase to 2 tablets by mouth twice daily (increased from 1 tablet twice daily)    Future Considerations:  Could start either Farxiga 5 mg once daily or Januvia 100 mg once daily   If agreeable could start Ozempic, Trulicity, or Mounjaro once weekly     Monitoring and Education:  Patient was educated on common side effects of SGLT2 inhibitors such as increased urinary frequency and risk of UTI/yeast infection   Was also informed that Januvia/Tradjenta are typically well tolerated   Was instructed to check blood sugar once daily but rotate checking some days fasting in the morning and other days ~2 hours after a meal  Was educated that while it is good to be concerned about side effects we also need to assess the risk/benefit of leaving her uncontrolled diabetes untreated as it can increase the risk of heart attack and stroke and cause issues with her kidneys, vision, etc.     We will plan to follow-up in ~4 weeks which will be after patient's next A1c check and after next appointment with Dr. Thakkar. She was encouraged to reach out with any questions  and/or concerns prior to our next encounter.            Relevant Medications    metFORMIN XR (Glucophage-XR) 500 mg 24 hr tablet    Other Relevant Orders    Referral to Clinical Pharmacy     Pharmacy Follow-Up: 11/08/2024   PCP Follow-Up: 10/15/2024    Continue all meds under the continuation of care with the referring provider and clinical pharmacy team.    Thank you,    Austin Gregg, PharmD   Clinical Pharmacist    Verbal consent to manage patient's drug therapy was obtained from the patient. They were informed they may decline to participate or withdraw from participation in pharmacy services at any time.

## 2024-10-11 ENCOUNTER — APPOINTMENT (OUTPATIENT)
Dept: PHARMACY | Facility: HOSPITAL | Age: 69
End: 2024-10-11
Payer: COMMERCIAL

## 2024-10-11 DIAGNOSIS — E11.9 DIABETES MELLITUS TYPE 2 WITHOUT RETINOPATHY (MULTI): Primary | ICD-10-CM

## 2024-10-11 RX ORDER — METFORMIN HYDROCHLORIDE 500 MG/1
1000 TABLET, EXTENDED RELEASE ORAL 2 TIMES DAILY
Qty: 360 TABLET | Refills: 1 | Status: SHIPPED | OUTPATIENT
Start: 2024-10-11 | End: 2025-04-09

## 2024-10-11 NOTE — ASSESSMENT & PLAN NOTE
Patient's goal A1c is < 7%.  Is pt at goal? No, most recent A1c was 8.9% on 07/27/24 which had increased from 7.9% on 09/27/23.  Patient's SMBGs are typically in the 140s-180s fasting.     Rationale for plan:   Patient is hesitant to start new medications and is concerned with side effects of GLP-1 agonists and SGLT2 inhibitors  She has sedentary job and is retiring at the end of the month - is hoping she can work on improving her diet and increasing activity once she retires  Is agreeable to increasing her Metformin now - is due for an updated A1c at the end of the month so we will see where the updated value is then and discuss next steps. She was provided with the names of medication options and she will look into them.     Medication Changes:  INCREASE  Metformin  mg 2 tablets by mouth in the morning and 1 tablet in the evening for 7 days then increase to 2 tablets by mouth twice daily (increased from 1 tablet twice daily)    Future Considerations:  Could start either Farxiga 5 mg once daily or Januvia 100 mg once daily   If agreeable could start Ozempic, Trulicity, or Mounjaro once weekly     Monitoring and Education:  Patient was educated on common side effects of SGLT2 inhibitors such as increased urinary frequency and risk of UTI/yeast infection   Was also informed that Januvia/Tradjenta are typically well tolerated   Was instructed to check blood sugar once daily but rotate checking some days fasting in the morning and other days ~2 hours after a meal  Was educated that while it is good to be concerned about side effects we also need to assess the risk/benefit of leaving her uncontrolled diabetes untreated as it can increase the risk of heart attack and stroke and cause issues with her kidneys, vision, etc.     We will plan to follow-up in ~4 weeks which will be after patient's next A1c check and after next appointment with Dr. Thakkar. She was encouraged to reach out with any questions and/or concerns  prior to our next encounter.

## 2024-10-15 ENCOUNTER — APPOINTMENT (OUTPATIENT)
Dept: PRIMARY CARE | Facility: CLINIC | Age: 69
End: 2024-10-15
Payer: COMMERCIAL

## 2024-10-15 ENCOUNTER — LAB (OUTPATIENT)
Dept: LAB | Facility: LAB | Age: 69
End: 2024-10-15
Payer: COMMERCIAL

## 2024-10-15 VITALS
OXYGEN SATURATION: 98 % | HEART RATE: 88 BPM | DIASTOLIC BLOOD PRESSURE: 60 MMHG | HEIGHT: 61 IN | RESPIRATION RATE: 16 BRPM | BODY MASS INDEX: 29.34 KG/M2 | TEMPERATURE: 98 F | WEIGHT: 155.4 LBS | SYSTOLIC BLOOD PRESSURE: 120 MMHG

## 2024-10-15 DIAGNOSIS — Z00.00 HEALTHCARE MAINTENANCE: ICD-10-CM

## 2024-10-15 DIAGNOSIS — E11.9 DIABETES MELLITUS TYPE 2 WITHOUT RETINOPATHY (MULTI): ICD-10-CM

## 2024-10-15 DIAGNOSIS — Z23 NEED FOR IMMUNIZATION AGAINST INFLUENZA: Primary | ICD-10-CM

## 2024-10-15 DIAGNOSIS — E78.2 MIXED HYPERLIPIDEMIA: ICD-10-CM

## 2024-10-15 DIAGNOSIS — I10 ESSENTIAL HYPERTENSION: ICD-10-CM

## 2024-10-15 PROCEDURE — 3061F NEG MICROALBUMINURIA REV: CPT | Performed by: INTERNAL MEDICINE

## 2024-10-15 PROCEDURE — 83036 HEMOGLOBIN GLYCOSYLATED A1C: CPT

## 2024-10-15 PROCEDURE — 3048F LDL-C <100 MG/DL: CPT | Performed by: INTERNAL MEDICINE

## 2024-10-15 PROCEDURE — 3008F BODY MASS INDEX DOCD: CPT | Performed by: INTERNAL MEDICINE

## 2024-10-15 PROCEDURE — 3074F SYST BP LT 130 MM HG: CPT | Performed by: INTERNAL MEDICINE

## 2024-10-15 PROCEDURE — 3078F DIAST BP <80 MM HG: CPT | Performed by: INTERNAL MEDICINE

## 2024-10-15 PROCEDURE — 99397 PER PM REEVAL EST PAT 65+ YR: CPT | Performed by: INTERNAL MEDICINE

## 2024-10-15 PROCEDURE — 1123F ACP DISCUSS/DSCN MKR DOCD: CPT | Performed by: INTERNAL MEDICINE

## 2024-10-15 PROCEDURE — 90471 IMMUNIZATION ADMIN: CPT | Performed by: INTERNAL MEDICINE

## 2024-10-15 PROCEDURE — 36415 COLL VENOUS BLD VENIPUNCTURE: CPT

## 2024-10-15 PROCEDURE — 90662 IIV NO PRSV INCREASED AG IM: CPT | Performed by: INTERNAL MEDICINE

## 2024-10-15 PROCEDURE — 1160F RVW MEDS BY RX/DR IN RCRD: CPT | Performed by: INTERNAL MEDICINE

## 2024-10-15 PROCEDURE — 1159F MED LIST DOCD IN RCRD: CPT | Performed by: INTERNAL MEDICINE

## 2024-10-15 PROCEDURE — 4010F ACE/ARB THERAPY RXD/TAKEN: CPT | Performed by: INTERNAL MEDICINE

## 2024-10-15 PROCEDURE — 80053 COMPREHEN METABOLIC PANEL: CPT

## 2024-10-15 PROCEDURE — 3052F HG A1C>EQUAL 8.0%<EQUAL 9.0%: CPT | Performed by: INTERNAL MEDICINE

## 2024-10-15 PROCEDURE — 1158F ADVNC CARE PLAN TLK DOCD: CPT | Performed by: INTERNAL MEDICINE

## 2024-10-15 ASSESSMENT — PATIENT HEALTH QUESTIONNAIRE - PHQ9
1. LITTLE INTEREST OR PLEASURE IN DOING THINGS: NOT AT ALL
2. FEELING DOWN, DEPRESSED OR HOPELESS: NOT AT ALL
SUM OF ALL RESPONSES TO PHQ9 QUESTIONS 1 AND 2: 0

## 2024-10-15 NOTE — PROGRESS NOTES
Subjective   Patient ID: Pooja Burns is a 69 y.o. female who presents for Annual Exam (Pt is here due to annual physical .).  HPI    Patient comes for Physical Exam.     Patient has been feeling pretty good and has been complaint with prescribed medications.    She is planing to retire from   next month.    We reviewed blood work including CBC, CMP, TSH, Lipid Panel, HbA1c, Vit D level done in .  Results within acceptable range.    Mammogram: 2024  DEXA:   Colon ca screenin, next in   Pneumonia Vacc:  Advance Directives: Healthcare Living Will and POA not on file. Patient advised to complete forms and bring/mail to the office.   POA discussed, confirmed and documented in patient's  EPIC record.   ASCVD risk: 15.8%    Patient was diagnosed with fatty liver, saw GI, had Fiberscan done which showed steatosis and 0 fibrosis.     We discussed importance of weight loss.     Patient may be a candidate for GLP1-a, follows with  [facility] pharmacist, we will consider  initiation of Ozempic or Mounjaro.     She went to ER on 3/13/24 with c/o abdominal pain and rectal bleeding.   CT abdomen and pelvis was done, no acute findings.   Urine cultures were positive for UTI, started on Cephalexin.     She underwent perfusion stress test in 2023 which was normal.  She gets SOB with exertion, most likely due to poor exercise tolerance and sedentary lifestyle.  I advised to start exercise program and gradually increase level of exertion. Advised to diony heart rate to at least 120 bpm when exercising.     Review of Systems   Constitutional: Negative.  Negative for activity change, appetite change and unexpected weight change.   HENT: Negative.  Negative for congestion, ear discharge, ear pain, hearing loss, mouth sores, nosebleeds, sinus pressure, sinus pain, sore throat, trouble swallowing and voice change.    Eyes: Negative.  Negative for pain, discharge, redness and visual disturbance.   Respiratory:  "Negative.  Negative for cough, chest tightness, shortness of breath, wheezing and stridor.    Cardiovascular: Negative.  Negative for chest pain, palpitations and leg swelling.   Gastrointestinal: Negative.  Negative for abdominal pain, blood in stool, constipation, diarrhea, nausea and vomiting.   Endocrine: Negative.  Negative for polydipsia, polyphagia and polyuria.   Genitourinary: Negative.  Negative for difficulty urinating, dysuria, flank pain, frequency and urgency.   Musculoskeletal: Negative.  Negative for arthralgias, back pain, gait problem, joint swelling, myalgias, neck pain and neck stiffness.   Skin: Negative.  Negative for color change, rash and wound.   Allergic/Immunologic: Negative.  Negative for environmental allergies, food allergies and immunocompromised state.   Neurological: Negative.  Negative for dizziness, tremors, seizures, syncope, facial asymmetry, speech difficulty, weakness, light-headedness, numbness and headaches.   Hematological: Negative.  Negative for adenopathy. Does not bruise/bleed easily.   Psychiatric/Behavioral: Negative.  Negative for agitation, behavioral problems, confusion, hallucinations, sleep disturbance and suicidal ideas. The patient is not nervous/anxious.    All other systems reviewed and are negative.      Objective     Review of systems was performed and all systems were negative except what in HPI    /60 (BP Location: Left arm, Patient Position: Sitting, BP Cuff Size: Adult)   Pulse 88   Temp 36.7 °C (98 °F) (Temporal)   Resp 16   Ht 1.537 m (5' 0.5\")   Wt 70.5 kg (155 lb 6.4 oz)   SpO2 98%   BMI 29.85 kg/m²    Physical Exam  Vitals and nursing note reviewed.   Constitutional:       General: She is not in acute distress.     Appearance: Normal appearance.   HENT:      Head: Normocephalic and atraumatic.      Right Ear: Tympanic membrane, ear canal and external ear normal.      Left Ear: Tympanic membrane, ear canal and external ear normal.      " Nose: Nose normal. No congestion or rhinorrhea.      Mouth/Throat:      Mouth: Mucous membranes are moist.      Pharynx: Oropharynx is clear.   Eyes:      General:         Right eye: No discharge.         Left eye: No discharge.      Extraocular Movements: Extraocular movements intact.      Conjunctiva/sclera: Conjunctivae normal.      Pupils: Pupils are equal, round, and reactive to light.   Cardiovascular:      Rate and Rhythm: Normal rate and regular rhythm.      Pulses: Normal pulses.      Heart sounds: Normal heart sounds. No murmur heard.     No friction rub. No gallop.   Pulmonary:      Effort: Pulmonary effort is normal. No respiratory distress.      Breath sounds: Normal breath sounds. No stridor. No wheezing, rhonchi or rales.   Chest:      Chest wall: No tenderness.   Abdominal:      General: Bowel sounds are normal.      Palpations: Abdomen is soft. There is no mass.      Tenderness: There is no abdominal tenderness. There is no guarding or rebound.   Musculoskeletal:         General: No swelling or deformity. Normal range of motion.      Cervical back: Normal range of motion and neck supple. No rigidity or tenderness.      Right lower leg: No edema.      Left lower leg: No edema.   Lymphadenopathy:      Cervical: No cervical adenopathy.   Skin:     General: Skin is warm and dry.      Coloration: Skin is not jaundiced.      Findings: No bruising or erythema.   Neurological:      General: No focal deficit present.      Mental Status: She is alert and oriented to person, place, and time. Mental status is at baseline.      Cranial Nerves: No cranial nerve deficit.      Motor: No weakness.      Coordination: Coordination normal.      Gait: Gait normal.   Psychiatric:         Mood and Affect: Mood normal.         Behavior: Behavior normal.         Thought Content: Thought content normal.         Judgment: Judgment normal.         Assessment/Plan   Problem List Items Addressed This Visit             ICD-10-CM        Cardiac and Vasculature    Essential hypertension I10     Controlled. Continue current medication.          Hyperlipidemia E78.5     Clinically stable. Continue statin.            Endocrine/Metabolic    Diabetes mellitus type 2 without retinopathy (Multi) E11.9    Relevant Orders    Comprehensive Metabolic Panel (Completed)    Hemoglobin A1C (Completed)       Health Encounters    Healthcare maintenance Z00.00     Other Visit Diagnoses         Codes    Need for immunization against influenza    -  Primary Z23    Relevant Orders    Flu vaccine, trivalent, preservative free, HIGH-DOSE, age 65y+ (Fluzone) (Completed)        It was a pleasure to see you today.  I would like to remind you about importance of a healthy lifestyle in order to improve your well-being and live longer.  Try to engage in physical activities for at least 150 minutes per week.  Eat about 10 servings of fruits and vegetables daily. My advice is 2 servings of fruits and 8 servings of vegetables.  For vegetables choose at least half of them green and at least half of them fresh.  Please avoid sugar, salt, fried food and saturated fat.      F/up in 2-3 months or sooner if needed.

## 2024-10-16 PROBLEM — Z00.00 HEALTHCARE MAINTENANCE: Status: ACTIVE | Noted: 2024-10-16

## 2024-10-16 LAB
ALBUMIN SERPL BCP-MCNC: 4.4 G/DL (ref 3.4–5)
ALP SERPL-CCNC: 100 U/L (ref 33–136)
ALT SERPL W P-5'-P-CCNC: 58 U/L (ref 7–45)
ANION GAP SERPL CALC-SCNC: 16 MMOL/L (ref 10–20)
AST SERPL W P-5'-P-CCNC: 36 U/L (ref 9–39)
BILIRUB SERPL-MCNC: 0.6 MG/DL (ref 0–1.2)
BUN SERPL-MCNC: 19 MG/DL (ref 6–23)
CALCIUM SERPL-MCNC: 9.6 MG/DL (ref 8.6–10.6)
CHLORIDE SERPL-SCNC: 101 MMOL/L (ref 98–107)
CO2 SERPL-SCNC: 24 MMOL/L (ref 21–32)
CREAT SERPL-MCNC: 1.18 MG/DL (ref 0.5–1.05)
EGFRCR SERPLBLD CKD-EPI 2021: 50 ML/MIN/1.73M*2
EST. AVERAGE GLUCOSE BLD GHB EST-MCNC: 197 MG/DL
GLUCOSE SERPL-MCNC: 251 MG/DL (ref 74–99)
HBA1C MFR BLD: 8.5 %
POTASSIUM SERPL-SCNC: 4.1 MMOL/L (ref 3.5–5.3)
PROT SERPL-MCNC: 6.8 G/DL (ref 6.4–8.2)
SODIUM SERPL-SCNC: 137 MMOL/L (ref 136–145)

## 2024-10-16 ASSESSMENT — ENCOUNTER SYMPTOMS
BACK PAIN: 0
CONFUSION: 0
STRIDOR: 0
WEAKNESS: 0
CARDIOVASCULAR NEGATIVE: 1
NAUSEA: 0
APPETITE CHANGE: 0
LIGHT-HEADEDNESS: 0
AGITATION: 0
DIFFICULTY URINATING: 0
DIARRHEA: 0
NECK PAIN: 0
NERVOUS/ANXIOUS: 0
FLANK PAIN: 0
EYE DISCHARGE: 0
ABDOMINAL PAIN: 0
VOICE CHANGE: 0
ACTIVITY CHANGE: 0
ARTHRALGIAS: 0
CONSTIPATION: 0
CONSTITUTIONAL NEGATIVE: 1
ADENOPATHY: 0
EYE REDNESS: 0
WOUND: 0
JOINT SWELLING: 0
SINUS PRESSURE: 0
COUGH: 0
SORE THROAT: 0
SEIZURES: 0
FACIAL ASYMMETRY: 0
TREMORS: 0
DYSURIA: 0
CHEST TIGHTNESS: 0
HALLUCINATIONS: 0
PALPITATIONS: 0
UNEXPECTED WEIGHT CHANGE: 0
WHEEZING: 0
FREQUENCY: 0
DIZZINESS: 0
HEADACHES: 0
NECK STIFFNESS: 0
TROUBLE SWALLOWING: 0
MYALGIAS: 0
NEUROLOGICAL NEGATIVE: 1
BRUISES/BLEEDS EASILY: 0
RESPIRATORY NEGATIVE: 1
ENDOCRINE NEGATIVE: 1
BLOOD IN STOOL: 0
HEMATOLOGIC/LYMPHATIC NEGATIVE: 1
ALLERGIC/IMMUNOLOGIC NEGATIVE: 1
SINUS PAIN: 0
POLYDIPSIA: 0
NUMBNESS: 0
GASTROINTESTINAL NEGATIVE: 1
EYE PAIN: 0
COLOR CHANGE: 0
SPEECH DIFFICULTY: 0
PSYCHIATRIC NEGATIVE: 1
SLEEP DISTURBANCE: 0
MUSCULOSKELETAL NEGATIVE: 1
SHORTNESS OF BREATH: 0
VOMITING: 0
POLYPHAGIA: 0
EYES NEGATIVE: 1

## 2024-10-16 NOTE — ASSESSMENT & PLAN NOTE
F/up with  GI.  Weight loss is advised. Target BMI: 25. Please follow low carbohydrate diet and exercise at least 150 minutes weekly.  Nutritional consultation is available, please let me know if interested.

## 2024-10-20 NOTE — RESULT ENCOUNTER NOTE
Your recent lab work was acceptable except elevated glucose abd HbA1c.  Please follow up with clinical pharmacist regarding your medications adjustment. We will discuss details during your next office visit. Please keep your next appointment as scheduled. Dr. Thakkar

## 2024-11-07 NOTE — PROGRESS NOTES
Clinical Pharmacy Appointment    Patient ID: Pooja Burns is a 69 y.o. female who presents for Type 2 Diabetes Mellitus management    Pt is here for Follow Up appointment.     Referring Provider: Felicia Valero M*  PCP: Felicia Valero MD PhD   Last visit with PCP: 10/15/2024   Next visit with PCP: 12/18/2024      Subjective     Interval History  At last pharmacy visit, patient's metformin was increased from metformin  mg once daily to 2 tablets (1000 mg) twice daily. Patient had stated she will be retiring from  at the end of October and will not longer have  insurance. Discussion regarding SGLT2i, DPP4-inhibitors, and GLP-1 therapy was reviewed with the patient. Weight reduction was also discussed with patient by Dr. Thakkar as well as initiation of Ozempic or Mounjaro.     Last A1c: 8.5% (10/15/2024)    HPI  DIABETES MELLITUS TYPE 2:    Diagnosed with diabetes:  ~10 years. Known diabetic complications: none.  Does patient follow with Endocrinology: No  Last optometry exam: pending insurance  Most recent visit in Podiatry: 10/03/2024-- patient denies sores or cuts on feet today      Current diabetic medications include:  Metformin  mg - 2 tablets (1000 mg) twice daily  Currently taking two tabs in the morning, and one tab in the evening (1500 mg total)   Denies any ADRs    Past diabetic medications include:  Jardiance (discontinued after 1 week due to lightheadedness)  Glipizide (discontinued due to hypoglycemia at the time)  Trulicity (was prescribed but did not start due to not being comfortable with injections and the side effects)  Metformin at 4 tablets daily (states blood sugars were too low)    Glucose Readings:  Glucometer/CGM Type: Accu-Chek Guide Me Glucose Meter   Patient tests BG once times per day    Current home BG readings: check in AM - fasting typically 140-150s   Previous home BG readings: fasting typically in the 140s-180s     Any episodes of hypoglycemia? No,  denies any s/sx of low blood sugar .    Does pt have proteinuria? No - last UACR 8.1 micrograms/mg (07/27/2024)    Lifestyle:  Diet: 3 meals/day.   BK: Eggs, toast, orange, coffee with cream, water  LN: [place] (grilled cheese) or soup, fruit  DN: Usually a protein, salad/vegetable, carb  Snacks: pretzels, fruit, celery w/ cream cheese, carrots with dip  Drinks: Juice occasionally  Physical Activity: Walking more now she is retired - 40 -60 min a day    Secondary Prevention:  Statin? Yes - rosuvastatin 5 mg daily  ACE-I/ARB? Yes - lisinopril 5 mg daily  Aspirin? Yes    The 10-year ASCVD risk score (Dex CUEVAS, et al., 2019) is: 15.8%    Values used to calculate the score:      Age: 69 years      Sex: Female      Is Non- : No      Diabetic: Yes      Tobacco smoker: No      Systolic Blood Pressure: 120 mmHg      Is BP treated: Yes      HDL Cholesterol: 54.4 mg/dL      Total Cholesterol: 170 mg/dL     Pertinent PMH Review:  PMH of Pancreatitis: No  PMH of Retinopathy: No  PMH of Urinary Tract Infections: Yes, a few throughout her life - most recent 03/13/2024  PMH of MTC / MEN2: No    Medication Reconciliation:  No medication changes at this time    Drug Interactions  No relevant drug interactions were noted.    Medication System Management  Adherence/Organization: denies any missed dose, pill organizer  Affordability/Accessibility: Patient has retired and awaiting enrollment into  medicare advantage      Objective   Allergies   Allergen Reactions    Atorvastatin Myalgia    Lovastatin Myalgia    Dicyclomine Rash     Social History     Social History Narrative    Not on file      Medication Review  Current Outpatient Medications   Medication Instructions    aspirin 81 mg EC tablet 1 tablet, Daily    azelastine (Astelin) 137 mcg (0.1 %) nasal spray ADMINISTER 2 SPRAYS INTO EACH NOSTRIL IN THE MORNING AND 2 SPRAYS BEFORE BEDTIME.    blood sugar diagnostic strip Use 1 test strip to check blood  sugar once daily or as directed    blood-glucose meter (Accu-Chek Guide Me Glucose Mtr) misc Use meter as directed daily.    famotidine (Pepcid) 20 mg tablet TAKE 1 TABLET (20 MG) BY MOUTH 2 TIMES A DAY.    hydroCHLOROthiazide (HYDRODiuril) 25 mg tablet TAKE 1 TABLET (25MG) BY MOUTH ONCE DAILY    lancets misc Use once daily whilte testing glucose level.    lisinopril 5 mg, oral, Daily    metFORMIN XR (GLUCOPHAGE-XR) 1,000 mg, oral, 2 times daily, Take with meals    potassium chloride CR (Klor-Con) 10 mEq ER tablet 10 mEq, oral, Daily    rosuvastatin (Crestor) 5 mg tablet TAKE 1 TABLET (5 MG) BY MOUTH ONCE DAILY AT BEDTIME.      Vitals  BP Readings from Last 2 Encounters:   10/15/24 120/60   07/15/24 100/60     BMI Readings from Last 1 Encounters:   10/15/24 29.85 kg/m²      Labs  A1C  Lab Results   Component Value Date    HGBA1C 8.5 (H) 10/15/2024    HGBA1C 8.9 (H) 07/27/2024    HGBA1C 7.9 (A) 09/27/2023     BMP  Lab Results   Component Value Date    CALCIUM 9.6 10/15/2024     10/15/2024    K 4.1 10/15/2024    CO2 24 10/15/2024     10/15/2024    BUN 19 10/15/2024    CREATININE 1.18 (H) 10/15/2024    EGFR 50 (L) 10/15/2024     LFTs  Lab Results   Component Value Date    ALT 58 (H) 10/15/2024    AST 36 10/15/2024    ALKPHOS 100 10/15/2024    BILITOT 0.6 10/15/2024     FLP  Lab Results   Component Value Date    TRIG 103 07/27/2024    CHOL 170 07/27/2024    LDLF 120 (H) 07/08/2023    LDLCALC 95 07/27/2024    HDL 54.4 07/27/2024     Urine Microalbumin  Lab Results   Component Value Date    MICROALBCREA 8.1 07/27/2024     Weight Management  Wt Readings from Last 3 Encounters:   10/15/24 70.5 kg (155 lb 6.4 oz)   07/15/24 68.7 kg (151 lb 6.4 oz)   06/07/24 68 kg (150 lb)        Assessment/Plan   Problem List Items Addressed This Visit       Diabetes mellitus type 2 without retinopathy (Multi)     Patient's goal A1c is < 7%.  Is pt at goal? No, most recent A1c was 8.5 % on 10/15/2024 which was a slight decrease  from 8.9% on 07/27/2024  Patient's SMBGs are typically in the 140-150s fasting.     Rationale for plan:   Patient is still hesitant to start new medications. Patient has strong reservations - mainly concerns with GLP-1 agonists and associated side effects. Educated patient common side effects are NOT the same for everyone and are usually transient.  Patient expressed wanting to wait to see her progress after her next PCP visit with Dr. Thakkar. Reiterated based on her A1c her diabetes is not controlled and puts her at higher risk for cardiovascular events such as stroke and heart attack as well as worsening kidney function.   Patient expressed she wants to continue to with current dose of metformin in adjunct to diet and exercise before considering adding another medication to manage her diabetes.   Reviewed importance of healthy diet, reducing sugary foods, at least 150 min of physical activity/week  Patient was agreeable to follow up by clinical pharmacy after her PCP visit with Dr. Thakkar (12/18/2024) and if no significant changes to her A1c have been made, will revisit the discussion to start new medications for the management of her diabetes.     Medication Changes:  CONTINUE:  Metformin  mg - 2 tablets in the morning and 1 tablet in the evening    Future Considerations:  Farxiga 5 mg once daily or Januvia 100 mg - consider Xigduo or Janumet to reduce pill burden  If agreeable could start Ozempic or Mounjaro once weekly          Relevant Orders    Referral to Clinical Pharmacy     Clinical Pharmacist follow-up: 01/03/2025, Telehealth visit    Continue all meds under the continuation of care with the referring provider and clinical pharmacy team.    Thank you,  Cyndi Lenz (Suji), PharmD  PGY-1  Meds Pharmacy Resident     Verbal consent to manage patient's drug therapy was obtained from the patient. They were informed they may decline to participate or withdraw from participation in pharmacy services at any  time.

## 2024-11-08 ENCOUNTER — APPOINTMENT (OUTPATIENT)
Dept: PHARMACY | Facility: HOSPITAL | Age: 69
End: 2024-11-08
Payer: COMMERCIAL

## 2024-11-08 DIAGNOSIS — E11.9 DIABETES MELLITUS TYPE 2 WITHOUT RETINOPATHY (MULTI): ICD-10-CM

## 2024-11-08 NOTE — ASSESSMENT & PLAN NOTE
Patient's goal A1c is < 7%.  Is pt at goal? No, most recent A1c was 8.5 % on 10/15/2024 which was a slight decrease from 8.9% on 07/27/2024  Patient's SMBGs are typically in the 140-150s fasting.     Rationale for plan:   Patient is still hesitant to start new medications. Patient has strong reservations - mainly concerns with GLP-1 agonists and associated side effects. Educated patient common side effects are NOT the same for everyone and are usually transient.  Patient expressed wanting to wait to see her progress after her next PCP visit with Dr. Thakkar. Reiterated based on her A1c her diabetes is not controlled and puts her at higher risk for cardiovascular events such as stroke and heart attack as well as worsening kidney function.   Patient expressed she wants to continue to with current dose of metformin in adjunct to diet and exercise before considering adding another medication to manage her diabetes.   Reviewed importance of healthy diet, reducing sugary foods, at least 150 min of physical activity/week  Patient was agreeable to follow up by clinical pharmacy after her PCP visit with Dr. Thakkar (12/18/2024) and if no significant changes to her A1c have been made, will revisit the discussion to start new medications for the management of her diabetes.     Medication Changes:  CONTINUE:  Metformin  mg - 2 tablets in the morning and 1 tablet in the evening    Future Considerations:  Farxiga 5 mg once daily or Januvia 100 mg - consider Xigduo or Janumet to reduce pill burden  If agreeable could start Ozempic or Mounjaro once weekly

## 2024-12-04 PROCEDURE — RXMED WILLOW AMBULATORY MEDICATION CHARGE

## 2024-12-06 ENCOUNTER — PHARMACY VISIT (OUTPATIENT)
Dept: PHARMACY | Facility: CLINIC | Age: 69
End: 2024-12-06
Payer: COMMERCIAL

## 2024-12-18 ENCOUNTER — PHARMACY VISIT (OUTPATIENT)
Dept: PHARMACY | Facility: CLINIC | Age: 69
End: 2024-12-18
Payer: COMMERCIAL

## 2024-12-18 ENCOUNTER — APPOINTMENT (OUTPATIENT)
Dept: PRIMARY CARE | Facility: CLINIC | Age: 69
End: 2024-12-18
Payer: COMMERCIAL

## 2024-12-18 VITALS
BODY MASS INDEX: 28.47 KG/M2 | HEART RATE: 78 BPM | WEIGHT: 150.8 LBS | SYSTOLIC BLOOD PRESSURE: 122 MMHG | HEIGHT: 61 IN | OXYGEN SATURATION: 98 % | TEMPERATURE: 98 F | RESPIRATION RATE: 16 BRPM | DIASTOLIC BLOOD PRESSURE: 72 MMHG

## 2024-12-18 DIAGNOSIS — I10 ESSENTIAL HYPERTENSION: ICD-10-CM

## 2024-12-18 DIAGNOSIS — Z12.11 COLON CANCER SCREENING: ICD-10-CM

## 2024-12-18 DIAGNOSIS — K76.0 STEATOSIS OF LIVER: ICD-10-CM

## 2024-12-18 DIAGNOSIS — E11.9 DIABETES MELLITUS TYPE 2 WITHOUT RETINOPATHY (MULTI): ICD-10-CM

## 2024-12-18 DIAGNOSIS — Z00.00 WELCOME TO MEDICARE PREVENTIVE VISIT: Primary | ICD-10-CM

## 2024-12-18 DIAGNOSIS — R06.09 DYSPNEA ON EXERTION: ICD-10-CM

## 2024-12-18 DIAGNOSIS — E78.2 MIXED HYPERLIPIDEMIA: ICD-10-CM

## 2024-12-18 DIAGNOSIS — Z78.0 POSTMENOPAUSAL: ICD-10-CM

## 2024-12-18 DIAGNOSIS — Z12.31 ENCOUNTER FOR SCREENING MAMMOGRAM FOR MALIGNANT NEOPLASM OF BREAST: ICD-10-CM

## 2024-12-18 DIAGNOSIS — N18.31 CHRONIC KIDNEY DISEASE, STAGE 3A (MULTI): ICD-10-CM

## 2024-12-18 LAB
POC FINGERSTICK BLOOD GLUCOSE: 170 MG/DL (ref 70–100)
POC HEMOGLOBIN A1C: 8.2 % (ref 4.2–6.5)

## 2024-12-18 PROCEDURE — 3061F NEG MICROALBUMINURIA REV: CPT | Performed by: INTERNAL MEDICINE

## 2024-12-18 PROCEDURE — 3048F LDL-C <100 MG/DL: CPT | Performed by: INTERNAL MEDICINE

## 2024-12-18 PROCEDURE — 99214 OFFICE O/P EST MOD 30 MIN: CPT | Performed by: INTERNAL MEDICINE

## 2024-12-18 PROCEDURE — 3008F BODY MASS INDEX DOCD: CPT | Performed by: INTERNAL MEDICINE

## 2024-12-18 PROCEDURE — G0403 EKG FOR INITIAL PREVENT EXAM: HCPCS | Performed by: INTERNAL MEDICINE

## 2024-12-18 PROCEDURE — 1170F FXNL STATUS ASSESSED: CPT | Performed by: INTERNAL MEDICINE

## 2024-12-18 PROCEDURE — 1036F TOBACCO NON-USER: CPT | Performed by: INTERNAL MEDICINE

## 2024-12-18 PROCEDURE — 83036 HEMOGLOBIN GLYCOSYLATED A1C: CPT | Performed by: INTERNAL MEDICINE

## 2024-12-18 PROCEDURE — 3074F SYST BP LT 130 MM HG: CPT | Performed by: INTERNAL MEDICINE

## 2024-12-18 PROCEDURE — 82962 GLUCOSE BLOOD TEST: CPT | Performed by: INTERNAL MEDICINE

## 2024-12-18 PROCEDURE — 3078F DIAST BP <80 MM HG: CPT | Performed by: INTERNAL MEDICINE

## 2024-12-18 PROCEDURE — 4010F ACE/ARB THERAPY RXD/TAKEN: CPT | Performed by: INTERNAL MEDICINE

## 2024-12-18 PROCEDURE — G0402 INITIAL PREVENTIVE EXAM: HCPCS | Performed by: INTERNAL MEDICINE

## 2024-12-18 PROCEDURE — 1123F ACP DISCUSS/DSCN MKR DOCD: CPT | Performed by: INTERNAL MEDICINE

## 2024-12-18 PROCEDURE — RXMED WILLOW AMBULATORY MEDICATION CHARGE

## 2024-12-18 PROCEDURE — 1159F MED LIST DOCD IN RCRD: CPT | Performed by: INTERNAL MEDICINE

## 2024-12-18 PROCEDURE — 3052F HG A1C>EQUAL 8.0%<EQUAL 9.0%: CPT | Performed by: INTERNAL MEDICINE

## 2024-12-18 PROCEDURE — 1160F RVW MEDS BY RX/DR IN RCRD: CPT | Performed by: INTERNAL MEDICINE

## 2024-12-18 RX ORDER — INSULIN PUMP SYRINGE, 3 ML
EACH MISCELLANEOUS
Qty: 1 EACH | Refills: 0 | OUTPATIENT
Start: 2024-12-18

## 2024-12-18 RX ORDER — LANCETS 28 GAUGE
EACH MISCELLANEOUS
Qty: 100 EACH | Refills: 2 | OUTPATIENT
Start: 2024-12-18

## 2024-12-18 ASSESSMENT — ENCOUNTER SYMPTOMS
JOINT SWELLING: 0
MYALGIAS: 0
BLOOD IN STOOL: 0
PALPITATIONS: 0
FLANK PAIN: 0
HALLUCINATIONS: 0
BRUISES/BLEEDS EASILY: 0
ENDOCRINE NEGATIVE: 1
PSYCHIATRIC NEGATIVE: 1
NUMBNESS: 0
DIZZINESS: 0
NEUROLOGICAL NEGATIVE: 1
EYE REDNESS: 0
COLOR CHANGE: 0
NAUSEA: 0
POLYPHAGIA: 0
APPETITE CHANGE: 0
NECK PAIN: 0
STRIDOR: 0
ADENOPATHY: 0
CONSTITUTIONAL NEGATIVE: 1
GASTROINTESTINAL NEGATIVE: 1
SLEEP DISTURBANCE: 0
BACK PAIN: 0
OCCASIONAL FEELINGS OF UNSTEADINESS: 0
TREMORS: 0
FREQUENCY: 0
WHEEZING: 0
DIARRHEA: 0
UNEXPECTED WEIGHT CHANGE: 0
WEAKNESS: 0
SEIZURES: 0
DIFFICULTY URINATING: 0
EYE PAIN: 0
NERVOUS/ANXIOUS: 0
EYES NEGATIVE: 1
TROUBLE SWALLOWING: 0
SINUS PRESSURE: 0
CARDIOVASCULAR NEGATIVE: 1
SORE THROAT: 0
FACIAL ASYMMETRY: 0
POLYDIPSIA: 0
ALLERGIC/IMMUNOLOGIC NEGATIVE: 1
CHEST TIGHTNESS: 0
HEMATOLOGIC/LYMPHATIC NEGATIVE: 1
EYE DISCHARGE: 0
AGITATION: 0
LIGHT-HEADEDNESS: 0
VOICE CHANGE: 0
SHORTNESS OF BREATH: 1
SPEECH DIFFICULTY: 0
COUGH: 0
LOSS OF SENSATION IN FEET: 0
ABDOMINAL PAIN: 0
DEPRESSION: 0
ARTHRALGIAS: 0
VOMITING: 0
CONFUSION: 0
HEADACHES: 0
WOUND: 0
CONSTIPATION: 0
DYSURIA: 0
MUSCULOSKELETAL NEGATIVE: 1
SINUS PAIN: 0
NECK STIFFNESS: 0
ACTIVITY CHANGE: 0

## 2024-12-18 ASSESSMENT — ACTIVITIES OF DAILY LIVING (ADL)
TAKING_MEDICATION: INDEPENDENT
DOING_HOUSEWORK: INDEPENDENT
GROCERY_SHOPPING: INDEPENDENT
MANAGING_FINANCES: INDEPENDENT
DRESSING: INDEPENDENT
BATHING: INDEPENDENT

## 2024-12-18 ASSESSMENT — VISUAL ACUITY
OS_CC: 20/40
OD_CC: 20/40

## 2024-12-18 NOTE — ASSESSMENT & PLAN NOTE
Patient's goal A1c is < 7%.  Is pt at goal? No, most recent A1c was 8.5 % on 10/15/2024 which was a slight decrease from 8.9% on 07/27/2024  Patient's SMBGs are typically in the 140-150s fasting.     Rationale for plan:   Patient is still hesitant to start new medications. Patient has strong reservations - mainly concerns with GLP-1 agonists and associated side effects. Educated patient common side effects are NOT the same for everyone and are usually transient.  Patient expressed wanting to wait to see her progress after her next PCP visit with Dr. Thakkar. Reiterated based on her A1c her diabetes is not controlled and puts her at higher risk for cardiovascular events such as stroke and heart attack as well as worsening kidney function.   Patient expressed she wants to continue to with current dose of metformin in adjunct to diet and exercise before considering adding another medication to manage her diabetes.   Reviewed importance of healthy diet, reducing sugary foods, at least 150 min of physical activity/week  Patient was agreeable to follow up by clinical pharmacy after her PCP visit with Dr. Thakkar (12/18/2024) and if no significant changes to her A1c have been made, will revisit the discussion to start new medications for the management of her diabetes.     Medication Changes:  CONTINUE:  Metformin  mg - 2 tablets in the morning and 1 tablet in the evening    Future Considerations:  Farxiga 5 mg once daily or Januvia 100 mg - consider Xigduo or Janumet to reduce pill burden  If agreeable could start Ozempic or Mounjaro once weekly     Orders:    POCT glycosylated hemoglobin (Hb A1C) manually resulted    POCT fingerstick glucose manually resulted

## 2024-12-18 NOTE — PROGRESS NOTES
Subjective   Reason for Visit: Pooja Burns is an 69 y.o. female here for a Medicare Wellness visit.     Past Medical, Surgical, and Family History reviewed and updated in chart.    Reviewed all medications by prescribing practitioner or clinical pharmacist (such as prescriptions, OTCs, herbal therapies and supplements) and documented in the medical record.    [facility]    Patient Care Team:  Felicia Valero MD PhD as PCP - General  Felicia Valero MD PhD as PCP - Employee [employer] PCP     Patient comes for Welcome to Medicare and Follow up visit.     Patient has been feeling pretty good and has been complaint with prescribed medications.    We reviewed blood work including CBC, CMP, TSH, Lipid Panel, HbA1c, Vit D level done in .  Results within acceptable range except elevated HbA1c: 8.5.    Mammogram: 2024  DEXA: : osteopenia  Colon ca screenin, next   Pneumonia Vacc: 2020  Advance Directives: Healthcare Living Will and POA not on file. Patient advised to complete forms and bring/mail to the office.   POA discussed, confirmed and documented in patient's  EPIC record.     MMSE: 30/30 (2024)    She retired from  on 2024.    Patient was diagnosed with fatty liver, saw GI, had Fiberscan done which showed steatosis and 0 fibrosis.     We discussed importance of weight loss.     Patient may be a candidate for GLP1-a, follows with  [facility] pharmacist, we will consider  initiation of Ozempic or Mounjaro.     She went to ER on 3/13/24 with c/o abdominal pain and rectal bleeding.   CT abdomen and pelvis was done, no acute findings.   Urine cultures were positive for UTI, started on Cephalexin.       Her ECG shed possible inferior infarct (noted today and on previous ECG's)  She underwent perfusion stress test in 2023 which was normal.    She gets SOB with exertion, most likely due to poor exercise tolerance and sedentary lifestyle.  I advised to start exercise program and gradually  increase level of exertion. Advised to diony heart rate to at least 120 bpm when exercising.     Patient was diagnosed with fatty liver, saw GI, had Fiberscan done which showed steatosis and 0 fibrosis.     Review of Systems   Constitutional: Negative.  Negative for activity change, appetite change and unexpected weight change.   HENT: Negative.  Negative for congestion, ear discharge, ear pain, hearing loss, mouth sores, nosebleeds, sinus pressure, sinus pain, sore throat, trouble swallowing and voice change.    Eyes: Negative.  Negative for pain, discharge, redness and visual disturbance.   Respiratory:  Positive for shortness of breath. Negative for cough, chest tightness, wheezing and stridor.    Cardiovascular: Negative.  Negative for chest pain, palpitations and leg swelling.   Gastrointestinal: Negative.  Negative for abdominal pain, blood in stool, constipation, diarrhea, nausea and vomiting.   Endocrine: Negative.  Negative for polydipsia, polyphagia and polyuria.   Genitourinary: Negative.  Negative for difficulty urinating, dysuria, flank pain, frequency and urgency.   Musculoskeletal: Negative.  Negative for arthralgias, back pain, gait problem, joint swelling, myalgias, neck pain and neck stiffness.   Skin: Negative.  Negative for color change, rash and wound.   Allergic/Immunologic: Negative.  Negative for environmental allergies, food allergies and immunocompromised state.   Neurological: Negative.  Negative for dizziness, tremors, seizures, syncope, facial asymmetry, speech difficulty, weakness, light-headedness, numbness and headaches.   Hematological: Negative.  Negative for adenopathy. Does not bruise/bleed easily.   Psychiatric/Behavioral: Negative.  Negative for agitation, behavioral problems, confusion, hallucinations, sleep disturbance and suicidal ideas. The patient is not nervous/anxious.    All other systems reviewed and are negative.      Objective   Vitals:  /72 (BP Location: Right  "arm, Patient Position: Sitting, BP Cuff Size: Adult)   Pulse 78   Temp 36.7 °C (98 °F) (Temporal)   Resp 16   Ht 1.537 m (5' 0.5\")   Wt 68.4 kg (150 lb 12.8 oz)   SpO2 98%   BMI 28.97 kg/m²       Physical Exam  Vitals and nursing note reviewed.   Constitutional:       General: She is not in acute distress.     Appearance: Normal appearance.   HENT:      Head: Normocephalic and atraumatic.      Right Ear: Tympanic membrane, ear canal and external ear normal.      Left Ear: Tympanic membrane, ear canal and external ear normal.      Nose: Nose normal. No congestion or rhinorrhea.      Mouth/Throat:      Mouth: Mucous membranes are moist.      Pharynx: Oropharynx is clear.   Eyes:      General:         Right eye: No discharge.         Left eye: No discharge.      Extraocular Movements: Extraocular movements intact.      Conjunctiva/sclera: Conjunctivae normal.      Pupils: Pupils are equal, round, and reactive to light.   Cardiovascular:      Rate and Rhythm: Normal rate and regular rhythm.      Pulses: Normal pulses.      Heart sounds: Normal heart sounds. No murmur heard.     No friction rub. No gallop.   Pulmonary:      Effort: Pulmonary effort is normal. No respiratory distress.      Breath sounds: Normal breath sounds. No stridor. No wheezing, rhonchi or rales.   Chest:      Chest wall: No tenderness.   Abdominal:      General: Bowel sounds are normal.      Palpations: Abdomen is soft. There is no mass.      Tenderness: There is no abdominal tenderness. There is no guarding or rebound.   Musculoskeletal:         General: No swelling or deformity. Normal range of motion.      Cervical back: Normal range of motion and neck supple. No rigidity or tenderness.      Right lower leg: No edema.      Left lower leg: No edema.   Lymphadenopathy:      Cervical: No cervical adenopathy.   Skin:     General: Skin is warm and dry.      Coloration: Skin is not jaundiced.      Findings: No bruising or erythema. "   Neurological:      General: No focal deficit present.      Mental Status: She is alert and oriented to person, place, and time. Mental status is at baseline.      Cranial Nerves: No cranial nerve deficit.      Motor: No weakness.      Coordination: Coordination normal.      Gait: Gait normal.   Psychiatric:         Mood and Affect: Mood normal.         Behavior: Behavior normal.         Thought Content: Thought content normal.         Judgment: Judgment normal.       Assessment & Plan  Welcome to Medicare preventive visit    Orders:    ECG 12 lead (Clinic Performed)    Encounter for screening mammogram for malignant neoplasm of breast    Orders:    BI mammo bilateral screening; Future    Postmenopausal    Orders:    XR DEXA bone density; Future    Diabetes mellitus type 2 without retinopathy (Multi)  Patient's goal A1c is < 7%.  Is pt at goal? No, most recent A1c was 8.5 % on 10/15/2024 which was a slight decrease from 8.9% on 07/27/2024  Patient's SMBGs are typically in the 140-150s fasting.     Rationale for plan:   Patient is still hesitant to start new medications. Patient has strong reservations - mainly concerns with GLP-1 agonists and associated side effects. Educated patient common side effects are NOT the same for everyone and are usually transient.  Patient expressed wanting to wait to see her progress after her next PCP visit with Dr. Thakkar. Reiterated based on her A1c her diabetes is not controlled and puts her at higher risk for cardiovascular events such as stroke and heart attack as well as worsening kidney function.   Patient expressed she wants to continue to with current dose of metformin in adjunct to diet and exercise before considering adding another medication to manage her diabetes.   Reviewed importance of healthy diet, reducing sugary foods, at least 150 min of physical activity/week  Patient was agreeable to follow up by clinical pharmacy after her PCP visit with Dr. Thakkar (12/18/2024)  and if no significant changes to her A1c have been made, will revisit the discussion to start new medications for the management of her diabetes.     Medication Changes:  CONTINUE:  Metformin  mg - 2 tablets in the morning and 1 tablet in the evening    Future Considerations:  Farxiga 5 mg once daily or Januvia 100 mg - consider Xigduo or Janumet to reduce pill burden  If agreeable could start Ozempic or Mounjaro once weekly     Orders:    POCT glycosylated hemoglobin (Hb A1C) manually resulted    POCT fingerstick glucose manually resulted    Essential hypertension  Controlled. Continue current medication.          Mixed hyperlipidemia  Clinically stable. Continue statin.         Chronic kidney disease, stage 3a (Multi)  Clinically stable. Will monitor.         Colon cancer screening    Orders:    Colonoscopy Screening; Average Risk Patient; Future    Dyspnea on exertion  Recent stress test: Normal (2023).  Please gradually increase your activities/exercise.         Steatosis of liver  F/up with  GI.  Weight loss is advised. Target BMI: 25. Please follow low carbohydrate diet and exercise at least 150 minutes weekly.  Nutritional consultation is available, please let me know if interested.        It was a pleasure to see you today.  I would like to remind you about importance of a healthy lifestyle in order to improve your well-being and live longer.  Try to engage in physical activities for at least 150 minutes per week.  Eat about 10 servings of fruits and vegetables daily. My advice is 2 servings of fruits and 8 servings of vegetables.  For vegetables choose at least half of them green and at least half of them fresh.  Please avoid sugar, salt, fried food and saturated fat.    I spent a total of 30 minutes on the date of service for follow up visit, which included preparing to see the patient, face-to-face patient care, completing clinical documentation, obtaining and/or reviewing separately obtained  history, performing a medically appropriate examination, counseling and educating the patient/family/caregiver, ordering medications, tests, or procedures, communicating with other health care providers (not separately reported), independently interpreting results (not separately reported), communicating results to the patient/family/caregiver, and care coordination (not separately reported).      F/up in 3 months or sooner if needed.

## 2024-12-18 NOTE — ASSESSMENT & PLAN NOTE
F/up with  GI.  Weight loss is advised. Target BMI: 25. Please follow low carbohydrate diet and exercise at least 150 minutes weekly.  Nutritional consultation is available, please let me know if interested.        It was a pleasure to see you today.  I would like to remind you about importance of a healthy lifestyle in order to improve your well-being and live longer.  Try to engage in physical activities for at least 150 minutes per week.  Eat about 10 servings of fruits and vegetables daily. My advice is 2 servings of fruits and 8 servings of vegetables.  For vegetables choose at least half of them green and at least half of them fresh.  Please avoid sugar, salt, fried food and saturated fat.    I spent a total of 30 minutes on the date of service for follow up visit, which included preparing to see the patient, face-to-face patient care, completing clinical documentation, obtaining and/or reviewing separately obtained history, performing a medically appropriate examination, counseling and educating the patient/family/caregiver, ordering medications, tests, or procedures, communicating with other health care providers (not separately reported), independently interpreting results (not separately reported), communicating results to the patient/family/caregiver, and care coordination (not separately reported).      F/up in 3 months or sooner if needed.

## 2024-12-23 ENCOUNTER — PHARMACY VISIT (OUTPATIENT)
Dept: PHARMACY | Facility: CLINIC | Age: 69
End: 2024-12-23

## 2024-12-27 DIAGNOSIS — E78.2 MIXED HYPERLIPIDEMIA: ICD-10-CM

## 2024-12-27 PROCEDURE — RXMED WILLOW AMBULATORY MEDICATION CHARGE

## 2024-12-27 RX ORDER — ROSUVASTATIN CALCIUM 5 MG/1
5 TABLET, COATED ORAL NIGHTLY
Qty: 90 TABLET | Refills: 3 | Status: SHIPPED | OUTPATIENT
Start: 2024-12-27 | End: 2025-12-27

## 2024-12-27 RX ORDER — ROSUVASTATIN CALCIUM 5 MG/1
5 TABLET, COATED ORAL NIGHTLY
Qty: 90 TABLET | Refills: 3 | Status: CANCELLED | OUTPATIENT
Start: 2024-12-27 | End: 2025-12-27

## 2024-12-31 ENCOUNTER — PHARMACY VISIT (OUTPATIENT)
Dept: PHARMACY | Facility: CLINIC | Age: 69
End: 2024-12-31
Payer: COMMERCIAL

## 2025-01-02 ENCOUNTER — TELEPHONE (OUTPATIENT)
Dept: GASTROENTEROLOGY | Facility: HOSPITAL | Age: 70
End: 2025-01-02
Payer: MEDICARE

## 2025-01-03 ENCOUNTER — APPOINTMENT (OUTPATIENT)
Dept: PHARMACY | Facility: HOSPITAL | Age: 70
End: 2025-01-03
Payer: COMMERCIAL

## 2025-01-06 DIAGNOSIS — Z12.11 ENCOUNTER FOR SCREENING COLONOSCOPY: Primary | ICD-10-CM

## 2025-01-10 ENCOUNTER — APPOINTMENT (OUTPATIENT)
Dept: PHARMACY | Facility: HOSPITAL | Age: 70
End: 2025-01-10
Payer: MEDICARE

## 2025-01-10 DIAGNOSIS — E11.9 DIABETES MELLITUS TYPE 2 WITHOUT RETINOPATHY (MULTI): ICD-10-CM

## 2025-01-10 NOTE — PROGRESS NOTES
Clinical Pharmacy Appointment    Patient ID: Pooja Burns is a 69 y.o. female who presents for Type 2 Diabetes Mellitus management.    Pt is here for Follow Up appointment.     Referring Provider: Felicia Valero M*  PCP: Felicia aVlero MD PhD   Last visit with PCP: 12/18/24   Next visit with PCP: 03/24/25      Subjective     Interval History  Initial clinical pharmacy visit 10/11/24: patient referred for T2DM, patient was retiring and would no longer have  insurance. Patient expressed hesitancy with starting new diabetes medication. Discussion of side effects and concerns were discussed at length, patient deferred to start new medications and agreed to increase metformin  mg to be titrated up to 1000 mg BID.     Clinical pharmacy visit 11/08/24: Patient's A1c remained above goal and after discussing medication options to help with blood sugar control, patient remained hesitant and wanted to defer initiating another medication until after her PCP visit with Dr. Thakkar (12/18/24) and if no significant changes to her A1c have been made, will revisit the discussion to start new medications for the management of her diabetes.     Last POCT A1c: 8.2% (12/18/24)    HPI  DIABETES MELLITUS TYPE 2:    Diagnosed with diabetes:  ~10 years   Known diabetic complications: none.  Does patient follow with Endocrinology: No  Last optometry exam: over a year since last eye appointment - planning to have one in the next 1-2 months  Last foot exam: Podiatry visit  10/03/24 -- patient denies sores or cuts on feet today      Current diabetic medications include:  Metformin  mg - 2 tablets twice daily  2 tabs in the AM and 1 tab in the evening  Adherence: denies  Adverse effects: denies    Clarifications to above regimen:  Last clinical pharmacy visit 11/08/24: taking two tabs in the morning, and one tab in the evening (1500 mg total)     Past diabetic medications include:  Jardiance (discontinued after 1  week due to lightheadedness)  Glipizide (discontinued due to hypoglycemia at the time)  Trulicity (was prescribed but did not start due to not being comfortable with injections and the side effects)  Metformin at 4 tablets daily (states blood sugars were too low)    Glucose Readings:  Glucometer/CGM Type: FreeStyle lite  Patient tests BG 1 times per day in the morning    Current home BG readings:    DATE FASTING BG   1/10 128   1/09 124   1/08 115     Previous home BG readings:   check in AM - fasting typically 140-150s     Any episodes of hypoglycemia? No, denies .    Did patient treat episode of hypoglycemia appropriately? N/A    Does pt have proteinuria? No - UACR 8.1 ug/mg crt 07/27/24     Lifestyle:  Diet: 3 meals/day - been watching her diet more now retired  Breakfast: Eggs, toast, orange, coffee with cream, water   Lunch: Bay (grilled cheese) or soup, fruit   Dinner: Usually a protein, salad/vegetable, carb   Snacks: pretzels, fruit, celery w/ cream cheese, carrots with dip, nuts  Drinks: Juice occasionally, drinks more water    Physical Activity: Walking more now she is retired - 40 -60 min a day, moving around more    Primary/Secondary Prevention:  Statin? Yes - rosuvastatin 5 mg   ACE-I/ARB? Yes - lisinopril 5 mg   Aspirin? Yes    Pertinent PMH Review:  PMH of Pancreatitis: No  PMH of Retinopathy: No  PMH of Urinary Tract Infections: Yes - most recent 07/2024  PMH of MTC/MEN2: No  PMH of gastroparesis/severe GI diseases: No    Medication Reconciliation:  Changed:   Added:   Discontinued:     Drug Interactions  No relevant drug interactions were noted.    Medication System Management  Patient's preferred pharmacy:   [facility]  [address] , [address]  [facility] [address]  Phone: [phone] Fax: [phone]    [facility] [place] [facility] AT Portal to Registered [facility] Sites  [facility]  [place] [address]  Phone:  [phone] Fax: [phone]     Objective   Allergies   Allergen Reactions    Atorvastatin Myalgia    Lovastatin Myalgia    Dicyclomine Rash     Social History     Social History Narrative    Not on file      Medication Review  Current Outpatient Medications   Medication Instructions    aspirin 81 mg EC tablet 1 tablet, Daily    azelastine (Astelin) 137 mcg (0.1 %) nasal spray ADMINISTER 2 SPRAYS INTO EACH NOSTRIL IN THE MORNING AND 2 SPRAYS BEFORE BEDTIME.    Blood glucose monitoring meter Use as directed to check blood glucose    blood sugar diagnostic strip Use 1 test strip to check blood sugar once daily or as directed    blood sugar diagnostic strip Use to check blood sugar once daily as directed    blood-glucose meter (Accu-Chek Guide Me Glucose Mtr) misc Use meter as directed daily.    famotidine (Pepcid) 20 mg tablet TAKE 1 TABLET (20 MG) BY MOUTH 2 TIMES A DAY.    FreeStyle lancets 28 gauge Use as instructed once daily    hydroCHLOROthiazide (HYDRODiuril) 25 mg tablet TAKE 1 TABLET (25MG) BY MOUTH ONCE DAILY    lancets misc Use once daily whilte testing glucose level.    lisinopril 5 mg, oral, Daily    metFORMIN XR (GLUCOPHAGE-XR) 1,000 mg, oral, 2 times daily, Take with meals    potassium chloride CR (Klor-Con) 10 mEq ER tablet 10 mEq, oral, Daily    rosuvastatin (CRESTOR) 5 mg, oral, Nightly      Vitals  BP Readings from Last 2 Encounters:   12/18/24 122/72   10/15/24 120/60     BMI Readings from Last 1 Encounters:   12/18/24 28.97 kg/m²      Labs  A1C  Lab Results   Component Value Date    HGBA1C 8.2 (A) 12/18/2024    HGBA1C 8.5 (H) 10/15/2024    HGBA1C 8.9 (H) 07/27/2024     BMP  Lab Results   Component Value Date    CALCIUM 9.6 10/15/2024     10/15/2024    K 4.1 10/15/2024    CO2 24 10/15/2024     10/15/2024    BUN 19 10/15/2024    CREATININE 1.18 (H) 10/15/2024    EGFR 50 (L) 10/15/2024     LFTs  Lab Results   Component Value Date    ALT 58 (H) 10/15/2024    AST 36 10/15/2024    ALKPHOS  100 10/15/2024    BILITOT 0.6 10/15/2024     FLP  Lab Results   Component Value Date    TRIG 103 07/27/2024    CHOL 170 07/27/2024    LDLF 120 (H) 07/08/2023    LDLCALC 95 07/27/2024    HDL 54.4 07/27/2024     Urine Microalbumin  Lab Results   Component Value Date    MICROALBCREA 8.1 07/27/2024     Weight Management  Wt Readings from Last 3 Encounters:   12/18/24 68.4 kg (150 lb 12.8 oz)   10/15/24 70.5 kg (155 lb 6.4 oz)   07/15/24 68.7 kg (151 lb 6.4 oz)        Assessment/Plan   Problem List Items Addressed This Visit       Diabetes mellitus type 2 without retinopathy (Multi)     Patient's goal A1c is < 7%.  Is pt at goal? No, patient is not at goal with A1c of 8.2% on 12/18/24, which was a slight decrease from 8.5% on 10/15/24  Patient's fasting blood sugar levels were report by patient to be generally less than 130, was only able to provide 3 days. Patient didn't have a formal log to assess at this encounter.   Discussed with patient the importance of checking blood sugars regularly and it allows for a more informed decision on how to adjust medications to help management diabetes  Patient agreeable to record blood sugar measurements for next follow up  Also discussed the importance of taking medications as directed - reviewed the initial directions for metformin was to reach max therapeutic dose of 1000 mg twice a day.       Rationale for plan:   Due to patient being above goal with A1c of 8.2% and has room to improve blood glucose control - recommended patient increase her evening metformin dose from 500 mg to 1000 mg (2 tabs)  Patient agreeable will start taking metformin 2 tab (1000 mg) BID - verified patient has sufficient qty of metformin at home  Will follow up in one month to assess how patient is doing with the increase in metformin and assess blood sugar control.     Medication Changes:  CONTINUE  Metformin  mg - 2 tab twice daily  Will take 2 tab in the morning and 2 tab in the evening          Relevant Orders    Referral to Clinical Pharmacy   Clinical Pharmacist follow-up: 02/07/25, Telehealth visit    Continue all meds under the continuation of care with the referring provider and clinical pharmacy team.    Thank you,  Cyndi Lenz (Suji), PharmD  PGY-1  Meds Pharmacy Resident     Verbal consent to manage patient's drug therapy was obtained from the patient. They were informed they may decline to participate or withdraw from participation in pharmacy services at any time.

## 2025-01-10 NOTE — ASSESSMENT & PLAN NOTE
Patient's goal A1c is < 7%.  Is pt at goal? No, patient is not at goal with A1c of 8.2% on 12/18/24, which was a slight decrease from 8.5% on 10/15/24  Patient's fasting blood sugar levels were report by patient to be generally less than 130, was only able to provide 3 days. Patient didn't have a formal log to assess at this encounter.   Discussed with patient the importance of checking blood sugars regularly and it allows for a more informed decision on how to adjust medications to help management diabetes  Patient agreeable to record blood sugar measurements for next follow up  Also discussed the importance of taking medications as directed - reviewed the initial directions for metformin was to reach max therapeutic dose of 1000 mg twice a day.       Rationale for plan:   Due to patient being above goal with A1c of 8.2% and has room to improve blood glucose control - recommended patient increase her evening metformin dose from 500 mg to 1000 mg (2 tabs)  Patient agreeable will start taking metformin 2 tab (1000 mg) BID - verified patient has sufficient qty of metformin at home  Will follow up in one month to assess how patient is doing with the increase in metformin and assess blood sugar control.     Medication Changes:  CONTINUE  Metformin  mg - 2 tab twice daily  Will take 2 tab in the morning and 2 tab in the evening

## 2025-01-13 RX ORDER — SOD SULF/POT CHLORIDE/MAG SULF 1.479 G
12 TABLET ORAL SEE ADMIN INSTRUCTIONS
Qty: 24 TABLET | Refills: 0 | Status: SHIPPED | OUTPATIENT
Start: 2025-01-13

## 2025-01-18 ENCOUNTER — APPOINTMENT (OUTPATIENT)
Dept: OPHTHALMOLOGY | Facility: CLINIC | Age: 70
End: 2025-01-18
Payer: COMMERCIAL

## 2025-01-24 ENCOUNTER — APPOINTMENT (OUTPATIENT)
Dept: OPHTHALMOLOGY | Facility: CLINIC | Age: 70
End: 2025-01-24
Payer: COMMERCIAL

## 2025-02-06 NOTE — PROGRESS NOTES
Clinical Pharmacy Appointment    Patient ID: Pooja Burns is a 69 y.o. female who presents for diabetes management.    Pt is here for Follow Up appointment.     Referring Provider: Felicia Valero M*  PCP: Felicia Valero MD PhD   Last visit with PCP: 12/18/24   Next visit with PCP: 03/24/25      Subjective     Interval History  Initial clinical pharmacy visit 10/11/24: patient referred for T2DM, patient was retiring and would no longer have  insurance. Patient expressed hesitancy with starting new diabetes medication. Discussion of side effects and concerns were discussed at length, patient deferred to start new medications and agreed to increase metformin  mg to be titrated up to 1000 mg BID.      Clinical pharmacy visit 11/08/24: Patient's A1c remained above goal and after discussing medication options to help with blood sugar control, patient remained hesitant and wanted to defer initiating another medication until after her PCP visit with Dr. Thakkar (12/18/24) and if no significant changes to her A1c have been made, will revisit the discussion to start new medications for the management of her diabetes.      Last POCT A1c: 8.2% (12/18/24)    HPI  DIABETES MELLITUS TYPE 2:    Diagnosed with diabetes:  ~10 years   Known diabetic complications: none.  Does patient follow with Endocrinology: No  Last optometry exam: over a year since last eye appointment - has eye exam scheduled 04/2025  Last foot exam: Podiatry visit  10/03/24 -- patient denies sores or cuts on feet today      Current diabetic medications include:  Metformin  mg - 2 tablets twice daily  Adherence: denies, uses a pill box  Adverse effects: denied      Clarifications to above regimen:  Last clinical pharmacy visit 11/08/24: taking two tabs in the morning, and one tab in the evening (1500 mg total)      Past diabetic medications include:  Jardiance (discontinued after 1 week due to lightheadedness)  Glipizide  (discontinued due to hypoglycemia at the time)  Trulicity (was prescribed but did not start due to not being comfortable with injections and the side effects)  Metformin at 4 tablets daily (states blood sugars were too low)    Glucose Readings:  Glucometer/CGM Type: FreeStyle lite   Patient usually tests BG 1 time per day - fasting    Current home BG readings:    DATE FASTING BG COMMENTS   01/20/25 121    01/23/25 78 Skipped lunch and doing errands   01/27/25 126    01/28/25 138    01/29/25 119    01/31/25 110    02/06/25 109    02/07/25 114      Average Fasting Glucose: 114 mg/dL    Previous home BG readings:   01/10/25:  DATE FASTING BG   1/10 128   1/09 124   1/08 115      11/08/24: self reported - fasting typically 140-150s     Any episodes of hypoglycemia? Yes, felt dizzy and shaky .  78mg/dL on 1/23 @ 4pm - didn't have lunch and was outside running errands most the day   Did patient treat episode of hypoglycemia appropriately? Yes, drank 1/2 glass of orange juice  Does the patient have a prescription for ready-to-use Glucagon? N/a    Does pt have proteinuria? No - UACR 8.1 ug/mg crt 07/27/24      Lifestyle: - No new changes  Diet: 3 meals/day - been watching her diet more now retired  Breakfast: Eggs, toast, orange, coffee with cream, water   Lunch: [place] (grilled cheese) or soup, fruit   Dinner: Usually a protein, salad/vegetable, carb   Snacks: pretzels, fruit, celery w/ cream cheese, carrots with dip, nuts  Drinks: Juice occasionally, drinks more water     Physical Activity: Walking more now she is retired - 40 -60 min a day, moving around more     Primary/Secondary Prevention:  Statin? Yes - rosuvastatin 5 mg   ACE-I/ARB? Yes - lisinopril 5 mg   Aspirin? Yes     Pertinent PMH Review:  PMH of Pancreatitis: No  PMH of Retinopathy: No  PMH of Urinary Tract Infections: Yes - most recent 07/2024  PMH of MTC/MEN2: No  PMH of gastroparesis/severe GI diseases: No    Drug Interactions  No relevant drug  interactions were noted.     Medication System Management  Patient's preferred pharmacy:   [facility]  [address] , [address]  [facility] [address]  Phone: [phone] Fax: [phone]     [facility] [place] [facility] AT Portal to Registered [facility] Sites  [facility]  [place] [address]  Phone: [phone] Fax: [phone]      Objective   Allergies   Allergen Reactions    Atorvastatin Myalgia    Lovastatin Myalgia    Dicyclomine Rash     Social History     Social History Narrative    Not on file      Medication Review  Current Outpatient Medications   Medication Instructions    aspirin 81 mg EC tablet 1 tablet, Daily    azelastine (Astelin) 137 mcg (0.1 %) nasal spray ADMINISTER 2 SPRAYS INTO EACH NOSTRIL IN THE MORNING AND 2 SPRAYS BEFORE BEDTIME.    Blood glucose monitoring meter Use as directed to check blood glucose    blood sugar diagnostic strip Use 1 test strip to check blood sugar once daily or as directed    blood sugar diagnostic strip Use to check blood sugar once daily as directed    blood-glucose meter (Accu-Chek Guide Me Glucose Mtr) misc Use meter as directed daily.    famotidine (Pepcid) 20 mg tablet TAKE 1 TABLET (20 MG) BY MOUTH 2 TIMES A DAY.    FreeStyle lancets 28 gauge Use as instructed once daily    hydroCHLOROthiazide (HYDRODiuril) 25 mg tablet TAKE 1 TABLET (25MG) BY MOUTH ONCE DAILY    lancets misc Use once daily whilte testing glucose level.    lisinopril 5 mg, oral, Daily    metFORMIN XR (GLUCOPHAGE-XR) 1,000 mg, oral, 2 times daily, Take with meals    potassium chloride CR (Klor-Con) 10 mEq ER tablet 10 mEq, oral, Daily    rosuvastatin (CRESTOR) 5 mg, oral, Nightly    sod sulf-pot chloride-mag sulf (Sutab) 1.479-0.188- 0.225 gram tablet tablet 12 tablets, oral, See admin instructions      Vitals  BP Readings from Last 2 Encounters:   12/18/24 122/72   10/15/24 120/60     BMI Readings from Last 1  Encounters:   12/18/24 28.97 kg/m²      Labs  A1C  Lab Results   Component Value Date    HGBA1C 8.2 (A) 12/18/2024    HGBA1C 8.5 (H) 10/15/2024    HGBA1C 8.9 (H) 07/27/2024     BMP  Lab Results   Component Value Date    CALCIUM 9.6 10/15/2024     10/15/2024    K 4.1 10/15/2024    CO2 24 10/15/2024     10/15/2024    BUN 19 10/15/2024    CREATININE 1.18 (H) 10/15/2024    EGFR 50 (L) 10/15/2024     LFTs  Lab Results   Component Value Date    ALT 58 (H) 10/15/2024    AST 36 10/15/2024    ALKPHOS 100 10/15/2024    BILITOT 0.6 10/15/2024     FLP  Lab Results   Component Value Date    TRIG 103 07/27/2024    CHOL 170 07/27/2024    LDLF 120 (H) 07/08/2023    LDLCALC 95 07/27/2024    HDL 54.4 07/27/2024     Urine Microalbumin  Lab Results   Component Value Date    MICROALBCREA 8.1 07/27/2024     Weight Management  Wt Readings from Last 3 Encounters:   12/18/24 68.4 kg (150 lb 12.8 oz)   10/15/24 70.5 kg (155 lb 6.4 oz)   07/15/24 68.7 kg (151 lb 6.4 oz)      There is no height or weight on file to calculate BMI.     Assessment/Plan   Problem List Items Addressed This Visit       Diabetes mellitus type 2 without retinopathy (Multi)     Patient's goal A1c is < 7%.  Is pt at goal? No, patient is above goal with most recent A1c of 8.2% on 12/18/24  Patient's home fasting blood glucose is generally within goal of <130 mg/dL  Patient generally checks blood sugar once a daily, usually fasting  Average fasting blood glucose was 114 mg/dL based on patient reported readings.   Had once reading >130 mg/dL  Did report having on reading of 78 mg/dL with sx of dizziness and feeling shaky - had skipped lunched and was active doing errands. Patient corrected with glass of orange juice and self-resolved  Patient confirmed taking metformin  mg - 2 tabs twice a day and denied any adherence issues  Denied any adverse drug reactions    Rationale for plan:   Patient doing well on metformin with no issues, fasting blood glucose  are mainly in goal, however her A1c has remained above goal, therefore instructed patient to continue to check blood glucose once daily and just alternate when measuring blood glucose, specifically 2 hours post meal or at bedtime to get a broader look at her blood sugars throughout the day.  Patient agreeable - will follow up in one month to assess home blood sugars and discuss medication adjustments if needed.     Medication Changes:  CONTINUE  Metformin  mg - 2 tab twice daily         Relevant Orders    Referral to Clinical Pharmacy   Clinical Pharmacist follow-up: 03/07/25, Telehealth visit    Continue all meds under the continuation of care with the referring provider and clinical pharmacy team.    Thank you,  Cydni Lenz (Suji), PharmD  PGY-1  Meds Pharmacy Resident     Verbal consent to manage patient's drug therapy was obtained from the patient. They were informed they may decline to participate or withdraw from participation in pharmacy services at any time.

## 2025-02-07 ENCOUNTER — APPOINTMENT (OUTPATIENT)
Dept: PHARMACY | Facility: HOSPITAL | Age: 70
End: 2025-02-07
Payer: MEDICARE

## 2025-02-07 DIAGNOSIS — E11.9 DIABETES MELLITUS TYPE 2 WITHOUT RETINOPATHY (MULTI): ICD-10-CM

## 2025-02-07 NOTE — ASSESSMENT & PLAN NOTE
Patient's goal A1c is < 7%.  Is pt at goal? No, patient is above goal with most recent A1c of 8.2% on 12/18/24  Patient's home fasting blood glucose is generally within goal of <130 mg/dL  Patient generally checks blood sugar once a daily, usually fasting  Average fasting blood glucose was 114 mg/dL based on patient reported readings.   Had once reading >130 mg/dL  Did report having on reading of 78 mg/dL with sx of dizziness and feeling shaky - had skipped lunched and was active doing errands. Patient corrected with glass of orange juice and self-resolved  Patient confirmed taking metformin  mg - 2 tabs twice a day and denied any adherence issues  Denied any adverse drug reactions    Rationale for plan:   Patient doing well on metformin with no issues, fasting blood glucose are mainly in goal, however her A1c has remained above goal, therefore instructed patient to continue to check blood glucose once daily and just alternate when measuring blood glucose, specifically 2 hours post meal or at bedtime to get a broader look at her blood sugars throughout the day.  Patient agreeable - will follow up in one month to assess home blood sugars and discuss medication adjustments if needed.     Medication Changes:  CONTINUE  Metformin  mg - 2 tab twice daily

## 2025-02-26 PROCEDURE — RXMED WILLOW AMBULATORY MEDICATION CHARGE

## 2025-03-03 ENCOUNTER — PHARMACY VISIT (OUTPATIENT)
Dept: PHARMACY | Facility: CLINIC | Age: 70
End: 2025-03-03
Payer: COMMERCIAL

## 2025-03-03 DIAGNOSIS — E11.9 TYPE 2 DIABETES MELLITUS WITHOUT COMPLICATION, WITHOUT LONG-TERM CURRENT USE OF INSULIN (MULTI): Primary | ICD-10-CM

## 2025-03-03 PROCEDURE — RXMED WILLOW AMBULATORY MEDICATION CHARGE

## 2025-03-04 ENCOUNTER — PHARMACY VISIT (OUTPATIENT)
Dept: PHARMACY | Facility: CLINIC | Age: 70
End: 2025-03-04
Payer: MEDICARE

## 2025-03-05 ENCOUNTER — PHARMACY VISIT (OUTPATIENT)
Dept: PHARMACY | Facility: CLINIC | Age: 70
End: 2025-03-05
Payer: COMMERCIAL

## 2025-03-05 PROCEDURE — RXMED WILLOW AMBULATORY MEDICATION CHARGE

## 2025-03-06 ENCOUNTER — TELEPHONE (OUTPATIENT)
Dept: PRIMARY CARE | Facility: CLINIC | Age: 70
End: 2025-03-06
Payer: MEDICARE

## 2025-03-06 NOTE — TELEPHONE ENCOUNTER
Pt was advised advise to stop aspirin for 1 week before colonoscopy, hold all oral medications day before colonoscopy, resume next day, after colonoscopy done.

## 2025-03-06 NOTE — TELEPHONE ENCOUNTER
Pt called- is scheduled for colonoscopy on march 12. Pt will be taking Sutab tablets. Pt would like to know what medications she is needing to stop before this procedure. Please advise, thank you

## 2025-03-07 ENCOUNTER — PHARMACY VISIT (OUTPATIENT)
Dept: PHARMACY | Facility: CLINIC | Age: 70
End: 2025-03-07
Payer: COMMERCIAL

## 2025-03-07 ENCOUNTER — APPOINTMENT (OUTPATIENT)
Dept: PHARMACY | Facility: HOSPITAL | Age: 70
End: 2025-03-07
Payer: MEDICARE

## 2025-03-12 ENCOUNTER — HOSPITAL ENCOUNTER (OUTPATIENT)
Dept: GASTROENTEROLOGY | Facility: HOSPITAL | Age: 70
Discharge: HOME | End: 2025-03-12
Payer: MEDICARE

## 2025-03-12 VITALS
SYSTOLIC BLOOD PRESSURE: 128 MMHG | HEART RATE: 86 BPM | OXYGEN SATURATION: 96 % | DIASTOLIC BLOOD PRESSURE: 84 MMHG | TEMPERATURE: 97.6 F | HEIGHT: 62 IN | WEIGHT: 150 LBS | RESPIRATION RATE: 20 BRPM | BODY MASS INDEX: 27.6 KG/M2

## 2025-03-12 DIAGNOSIS — Z12.11 COLON CANCER SCREENING: ICD-10-CM

## 2025-03-12 DIAGNOSIS — Z86.0100 HISTORY OF COLON POLYPS: Primary | ICD-10-CM

## 2025-03-12 LAB — GLUCOSE BLD MANUAL STRIP-MCNC: 151 MG/DL (ref 74–99)

## 2025-03-12 PROCEDURE — 99152 MOD SED SAME PHYS/QHP 5/>YRS: CPT | Performed by: STUDENT IN AN ORGANIZED HEALTH CARE EDUCATION/TRAINING PROGRAM

## 2025-03-12 PROCEDURE — 3700000012 HC SEDATION LEVEL 5+ TIME - INITIAL 15 MINUTES 5/> YEARS

## 2025-03-12 PROCEDURE — 3700000013 HC SEDATION LEVEL 5+ TIME - EACH ADDITIONAL 15 MINUTES

## 2025-03-12 PROCEDURE — 82947 ASSAY GLUCOSE BLOOD QUANT: CPT

## 2025-03-12 PROCEDURE — 7100000009 HC PHASE TWO TIME - INITIAL BASE CHARGE

## 2025-03-12 PROCEDURE — 7100000010 HC PHASE TWO TIME - EACH INCREMENTAL 1 MINUTE

## 2025-03-12 PROCEDURE — 45380 COLONOSCOPY AND BIOPSY: CPT | Performed by: STUDENT IN AN ORGANIZED HEALTH CARE EDUCATION/TRAINING PROGRAM

## 2025-03-12 PROCEDURE — 2500000004 HC RX 250 GENERAL PHARMACY W/ HCPCS (ALT 636 FOR OP/ED): Performed by: STUDENT IN AN ORGANIZED HEALTH CARE EDUCATION/TRAINING PROGRAM

## 2025-03-12 PROCEDURE — 99153 MOD SED SAME PHYS/QHP EA: CPT | Performed by: STUDENT IN AN ORGANIZED HEALTH CARE EDUCATION/TRAINING PROGRAM

## 2025-03-12 RX ORDER — MIDAZOLAM HYDROCHLORIDE 1 MG/ML
INJECTION, SOLUTION INTRAMUSCULAR; INTRAVENOUS AS NEEDED
Status: COMPLETED | OUTPATIENT
Start: 2025-03-12 | End: 2025-03-12

## 2025-03-12 RX ORDER — FENTANYL CITRATE 50 UG/ML
INJECTION, SOLUTION INTRAMUSCULAR; INTRAVENOUS AS NEEDED
Status: COMPLETED | OUTPATIENT
Start: 2025-03-12 | End: 2025-03-12

## 2025-03-12 RX ADMIN — MIDAZOLAM 1 MG: 1 INJECTION INTRAMUSCULAR; INTRAVENOUS at 08:14

## 2025-03-12 RX ADMIN — MIDAZOLAM 2 MG: 1 INJECTION INTRAMUSCULAR; INTRAVENOUS at 08:12

## 2025-03-12 RX ADMIN — FENTANYL CITRATE 25 MCG: 50 INJECTION, SOLUTION INTRAMUSCULAR; INTRAVENOUS at 08:13

## 2025-03-12 RX ADMIN — MIDAZOLAM 1 MG: 1 INJECTION INTRAMUSCULAR; INTRAVENOUS at 08:18

## 2025-03-12 RX ADMIN — FENTANYL CITRATE 50 MCG: 50 INJECTION, SOLUTION INTRAMUSCULAR; INTRAVENOUS at 08:12

## 2025-03-12 RX ADMIN — MIDAZOLAM 1 MG: 1 INJECTION INTRAMUSCULAR; INTRAVENOUS at 08:36

## 2025-03-12 RX ADMIN — FENTANYL CITRATE 25 MCG: 50 INJECTION, SOLUTION INTRAMUSCULAR; INTRAVENOUS at 08:18

## 2025-03-12 ASSESSMENT — PAIN - FUNCTIONAL ASSESSMENT
PAIN_FUNCTIONAL_ASSESSMENT: 0-10

## 2025-03-12 ASSESSMENT — PAIN SCALES - GENERAL
PAINLEVEL_OUTOF10: 0 - NO PAIN

## 2025-03-12 ASSESSMENT — COLUMBIA-SUICIDE SEVERITY RATING SCALE - C-SSRS: 1. IN THE PAST MONTH, HAVE YOU WISHED YOU WERE DEAD OR WISHED YOU COULD GO TO SLEEP AND NOT WAKE UP?: NO

## 2025-03-12 NOTE — H&P
"History Of Present Illness  Pooja Burns \"Catie\" is a 69 y.o. female with h/o colonic polyps presenting for surveillance colonoscopy. Last colonoscopy (2/2023) with < 1 cm polyp (no colonic tissue identified on path). Prep was fair at the time with recommended repeat colonoscopy in 2 years.      Past Medical History  Past Medical History:   Diagnosis Date    Benign paroxysmal vertigo, unspecified ear 03/22/2022    Benign paroxysmal positional vertigo    Calculus of gallbladder without cholecystitis without obstruction 11/14/2019    Gallstones    Chronic cough 10/18/2016    Chronic cough    Essential (primary) hypertension 03/12/2021    Hypertension    Essential (primary) hypertension 11/06/2022    Essential hypertension    Personal history of other diseases of the circulatory system 04/26/2022    History of essential hypertension    Personal history of other diseases of the digestive system 10/09/2018    History of cholelithiasis    Personal history of other diseases of the digestive system     History of hiatal hernia    Personal history of other diseases of the nervous system and sense organs     History of sleep apnea    Personal history of other diseases of the respiratory system 11/19/2018    History of acute sinusitis    Personal history of other endocrine, nutritional and metabolic disease     History of high cholesterol    Personal history of other endocrine, nutritional and metabolic disease     History of diabetes mellitus    Personal history of other specified conditions 04/26/2022    History of chest pain    Personal history of other specified conditions 11/21/2016    History of nausea    Type 2 diabetes mellitus without complications (Multi) 11/04/2022    Diabetes mellitus type 2 without retinopathy    Unspecified disorder of ear, unspecified ear 04/26/2022    Ear, nose and throat disorder    Urinary tract infection, site not specified 04/02/2018    Acute lower UTI     Surgical History  Past " "Surgical History:   Procedure Laterality Date    APPENDECTOMY  08/22/2013    Appendectomy    HYSTERECTOMY  08/22/2013    Hysterectomy    OTHER SURGICAL HISTORY  11/22/2016    Incisional Hernia Repair - Incarcerated    OTHER SURGICAL HISTORY  10/20/2015    Nephrectomy    RHINOPLASTY  08/22/2013    Rhinoplasty     Social History  She reports that she has never smoked. She has been exposed to tobacco smoke. She has never used smokeless tobacco. She reports that she does not drink alcohol and does not use drugs.    Family History  Family History   Problem Relation Name Age of Onset    Heart attack Mother      Hypertension Mother      Other (acute myocardial infarction) Father      Heart disease Other      Crohn's disease Maternal Cousin          Allergies  Allergies   Allergen Reactions    Atorvastatin Myalgia    Lovastatin Myalgia    Dicyclomine Rash     Review of Systems  Pre-sedation Evaluation:  ASA Classification - ASA 3 - Patient with moderate systemic disease with functional limitations  Mallampati Score - III (soft and hard palate and base of uvula visible)    Physical Exam     Last Recorded Vitals  Blood pressure 142/84, pulse 98, temperature 36.4 °C (97.6 °F), temperature source Temporal, resp. rate (!) 92, height 1.575 m (5' 2\"), weight 68 kg (150 lb), SpO2 96%.     Assessment/Plan   Will proceed with colonoscopy      PTA/Current Medications:  (Not in a hospital admission)    Current Outpatient Medications   Medication Sig Dispense Refill    hydroCHLOROthiazide (HYDRODiuril) 25 mg tablet Take 1 tablet (25 mg) by mouth once daily. 90 tablet 3    lisinopril 5 mg tablet Take 1 tablet (5 mg) by mouth once daily. 90 tablet 3    metFORMIN XR (Glucophage-XR) 500 mg 24 hr tablet Take 2 tablets (1,000 mg) by mouth 2 times a day. Take with meals (Patient taking differently: Take 3 tablets (1,500 mg) by mouth once daily. Taking 2 tablets (1000 mg) in the morning and 1 tablet (500 mg) in the evening) 360 tablet 1    " potassium chloride CR (Klor-Con) 10 mEq ER tablet Take 1 tablet (10 mEq) by mouth once daily. 90 tablet 3    rosuvastatin (Crestor) 5 mg tablet Take 1 tablet (5 mg) by mouth once daily at bedtime. 90 tablet 3    sod sulf-pot chloride-mag sulf (Sutab) 1.479-0.188- 0.225 gram tablet tablet Take 12 tablets by mouth see administration instructions for 2 doses. 24 tablet 0    aspirin 81 mg EC tablet Take 1 tablet (81 mg) by mouth once daily.      azelastine (Astelin) 137 mcg (0.1 %) nasal spray ADMINISTER 2 SPRAYS INTO EACH NOSTRIL IN THE MORNING AND 2 SPRAYS BEFORE BEDTIME. 30 mL 3    Blood glucose monitoring meter Use as directed to check blood glucose 1 each 0    blood sugar diagnostic strip Use 1 test strip to check blood sugar once daily or as directed 100 each 3    blood sugar diagnostic strip Use to check blood sugar once daily as directed 100 each 3    blood-glucose meter (Accu-Chek Guide Me Glucose Mtr) misc Use meter as directed daily. 1 each 0    famotidine (Pepcid) 20 mg tablet TAKE 1 TABLET (20 MG) BY MOUTH 2 TIMES A DAY. 180 tablet 3    FreeStyle lancets 28 gauge Use as instructed once daily 100 each 2    lancets misc Use once daily whilte testing glucose level. 100 each 2     No current facility-administered medications for this encounter.     Andrew Castillo MD

## 2025-03-13 NOTE — PROGRESS NOTES
"  Clinical Pharmacy Appointment    Patient ID: Pooja Burns \"Catie\" is a 69 y.o. female who presents for diabetes management.    Pt is here for Follow Up appointment.     Referring Provider: Felicia Valero M*  PCP: Felicia Valero MD PhD   Last visit with PCP: 12/18/24   Next visit with PCP: 03/24/25      Subjective     Interval History  Patient referred for T2DM. Patient expressed hesitancy with starting new diabetes medication. Discussion of side effects and concerns were discussed at length, patient deferred to start new medications and agreed to increase metformin  mg to be titrated up to 1000 mg BID. Patient still has hesitancy with starting new diabetes medications, took some time to titrated patient to target metformin dose of 1000 mg BID.     At last clinical pharmacy encounter 02/07/25 - patient has started to monitor home blood glucose levels. Fasting blood glucose were generally within goal, did not have post prandial blood glucose levels. Recommended patient continue to check fasting and post prandial blood glucose regularly.      Last POCT A1c: 8.2% (12/18/24)    HPI  DIABETES MELLITUS TYPE 2:    Diagnosed with diabetes:  ~10 years   Known diabetic complications: none.  Does patient follow with Endocrinology: No  Last optometry exam: over a year since last eye appointment - has eye exam scheduled 04/2025  Last foot exam: Podiatry visit  10/03/24 -- patient denies sores or cuts on feet today      Current diabetic medications include:  Metformin  mg - 2 tablets twice daily  Adherence: denies, uses a pill box  Adverse effects: denied      Past diabetic medications include:  Jardiance (discontinued after 1 week due to lightheadedness)  Glipizide (discontinued due to hypoglycemia at the time)  Trulicity (was prescribed but did not start due to not being comfortable with injections and the side effects)  Metformin at 4 tablets daily (states blood sugars were too low)    Glucose " Readings:  Glucometer/CGM Type: FreeStyle lite   Patient tested fasting and 2 hours after a meal    Current home BG readings:   DATE  FBG PPBG Comments                25 115 100   25 128 182 Carb heavy meal  25 108 174   25 106 83 missed a meal prior  25 122 164   25 114 218 Carb heavy meal  25 115 147   25 118 134   25 114 189 Carb heavy meal  25 103 150                                           Average fastin  Average post prandial: 154    Previous home BG readings:   25:  DATE FASTING BG COMMENTS   25 121    25 78 Skipped lunch and doing errands   25 126    25 138    25 119    25 110    25 109    25 114    Average Fasting Glucose: 114 mg/dL    01/10/25:  DATE FASTING BG   1/10 128    124    115      24: self reported - fasting typically 140-150s     Any episodes of hypoglycemia? Denies  Did patient treat episode of hypoglycemia appropriately? N/A  Does the patient have a prescription for ready-to-use Glucagon? N/a    Does pt have proteinuria? No - UACR 8.1 ug/mg crt 24      Lifestyle: - No new changes  Diet: 3 meals/day - been watching her diet more now retired  Breakfast: Eggs, toast, orange, coffee with cream, water   Lunch: [place] (grilled cheese) or soup, fruit   Dinner: Usually a protein, salad/vegetable, carb   Snacks: pretzels, fruit, celery w/ cream cheese, carrots with dip, nuts  Drinks: Juice occasionally, drinks more water     Physical Activity: Walking more now she is retired - 40 -60 min a day, moving around more     Primary/Secondary Prevention:  Statin? Yes - rosuvastatin 5 mg   ACE-I/ARB? Yes - lisinopril 5 mg   Aspirin? Yes     Pertinent PMH Review:  PMH of Pancreatitis: No  PMH of Retinopathy: No  PMH of Urinary Tract Infections: Yes - most recent 2024  PMH of MTC/MEN2: No  PMH of gastroparesis/severe GI diseases: No    Drug Interactions  No relevant  drug interactions were noted.     Medication System Management  Patient's preferred pharmacy:   [facility]  [address] , [address]  [facility] [address]  Phone: [phone] Fax: [phone]     [facility] [place] [facility] AT Portal to Registered [facility]  [facility]  [place] [address]  Phone: [phone] Fax: [phone]      Objective   Allergies   Allergen Reactions    Atorvastatin Myalgia    Lovastatin Myalgia    Dicyclomine Rash     Social History     Social History Narrative    Not on file      Medication Review  Current Outpatient Medications   Medication Instructions    aspirin 81 mg EC tablet 1 tablet, Daily    azelastine (Astelin) 137 mcg (0.1 %) nasal spray ADMINISTER 2 SPRAYS INTO EACH NOSTRIL IN THE MORNING AND 2 SPRAYS BEFORE BEDTIME.    Blood glucose monitoring meter Use as directed to check blood glucose    blood sugar diagnostic strip Use 1 test strip to check blood sugar once daily or as directed    blood sugar diagnostic strip Use to check blood sugar once daily as directed    blood-glucose meter (Accu-Chek Guide Me Glucose Mtr) misc Use meter as directed daily.    famotidine (Pepcid) 20 mg tablet TAKE 1 TABLET (20 MG) BY MOUTH 2 TIMES A DAY.    FreeStyle lancets 28 gauge Use as instructed once daily    hydroCHLOROthiazide (HYDRODIURIL) 25 mg, oral, Daily    lancets misc Use once daily whilte testing glucose level.    lisinopril 5 mg, oral, Daily    metFORMIN XR (GLUCOPHAGE-XR) 1,000 mg, oral, 2 times daily, Take with meals    potassium chloride CR (Klor-Con) 10 mEq ER tablet 10 mEq, oral, Daily    rosuvastatin (CRESTOR) 5 mg, oral, Nightly    sod sulf-pot chloride-mag sulf (Sutab) 1.479-0.188- 0.225 gram tablet tablet 12 tablets, oral, See admin instructions      Vitals  BP Readings from Last 2 Encounters:   03/12/25 128/84   12/18/24 122/72     BMI Readings from Last 1 Encounters:   03/12/25 27.44 kg/m²       Labs  A1C  Lab Results   Component Value Date    HGBA1C 8.2 (A) 2024    HGBA1C 8.5 (H) 10/15/2024    HGBA1C 8.9 (H) 2024     BMP  Lab Results   Component Value Date    CALCIUM 9.6 10/15/2024     10/15/2024    K 4.1 10/15/2024    CO2 24 10/15/2024     10/15/2024    BUN 19 10/15/2024    CREATININE 1.18 (H) 10/15/2024    EGFR 50 (L) 10/15/2024     LFTs  Lab Results   Component Value Date    ALT 58 (H) 10/15/2024    AST 36 10/15/2024    ALKPHOS 100 10/15/2024    BILITOT 0.6 10/15/2024     FLP  Lab Results   Component Value Date    TRIG 103 2024    CHOL 170 2024    LDLF 120 (H) 2023    LDLCALC 95 2024    HDL 54.4 2024     Urine Microalbumin  Lab Results   Component Value Date    MICROALBCREA 8.1 2024     Weight Management  Wt Readings from Last 3 Encounters:   25 68 kg (150 lb)   24 68.4 kg (150 lb 12.8 oz)   10/15/24 70.5 kg (155 lb 6.4 oz)      There is no height or weight on file to calculate BMI.     Assessment/Plan   Problem List Items Addressed This Visit       Diabetes mellitus type 2 without retinopathy (Multi)     Patient's goal A1c is < 7%.  Patient is not at goal with most recent A1c of 8.2% on 24.  Patient's SMBGs are within goal:  Average fastin mg/dL  Average post prandial: 154 mg/dL  Patient confirmed taking metformin  mg - 2 tab twice daily as directed, denies any issues with adherence and denied any adverse drug reactions.  Denied any recent s/sx of hypoglycemia - is mindful of making sure to eat regular meals throughout the day.    Rationale for plan:   Due patient's SMBGs being at goal, will continue to monitor patient's glycemic control and will not make medication adjustments at this time.   Patient at target dose for metformin of 1000 mg BID.  Patient will be seeing Dr. Thakkar 25 and may have another A1c completed.  Will follow up after patient's PCP visit to reassess home blood glucose, new labs if  available, and if medication changes are warranted.     Medication Changes:  CONTINUE  Metformin  mg - 2 tabs twice daily    Future Considerations:  If additional glycemic control is needed based on updated A1c - consider re-trial of Jardiance or discuss GLP-1 therapy.         Relevant Orders    Referral to Clinical Pharmacy     Clinical Pharmacist follow-up: 04/18/25, Telehealth visit    Continue all meds under the continuation of care with the referring provider and clinical pharmacy team.    Thank you,  Cyndi Lenz (Suji), PharmD  PGY-1  Meds Pharmacy Resident     Verbal consent to manage patient's drug therapy was obtained from the patient. They were informed they may decline to participate or withdraw from participation in pharmacy services at any time.

## 2025-03-14 ENCOUNTER — APPOINTMENT (OUTPATIENT)
Dept: PHARMACY | Facility: HOSPITAL | Age: 70
End: 2025-03-14
Payer: MEDICARE

## 2025-03-14 DIAGNOSIS — E11.9 DIABETES MELLITUS TYPE 2 WITHOUT RETINOPATHY (MULTI): ICD-10-CM

## 2025-03-14 PROCEDURE — RXMED WILLOW AMBULATORY MEDICATION CHARGE

## 2025-03-14 NOTE — ASSESSMENT & PLAN NOTE
Patient's goal A1c is < 7%.  Patient is not at goal with most recent A1c of 8.2% on 24.  Patient's SMBGs are within goal:  Average fastin mg/dL  Average post prandial: 154 mg/dL  Patient confirmed taking metformin  mg - 2 tab twice daily as directed, denies any issues with adherence and denied any adverse drug reactions.  Denied any recent s/sx of hypoglycemia - is mindful of making sure to eat regular meals throughout the day.    Rationale for plan:   Due patient's SMBGs being at goal, will continue to monitor patient's glycemic control and will not make medication adjustments at this time.   Patient at target dose for metformin of 1000 mg BID.  Patient will be seeing Dr. Thakkar 25 and may have another A1c completed.  Will follow up after patient's PCP visit to reassess home blood glucose, new labs if available, and if medication changes are warranted.     Medication Changes:  CONTINUE  Metformin  mg - 2 tabs twice daily    Future Considerations:  If additional glycemic control is needed based on updated A1c - consider re-trial of Jardiance or discuss GLP-1 therapy.

## 2025-03-19 ENCOUNTER — PHARMACY VISIT (OUTPATIENT)
Dept: PHARMACY | Facility: CLINIC | Age: 70
End: 2025-03-19
Payer: COMMERCIAL

## 2025-03-19 LAB
LABORATORY COMMENT REPORT: NORMAL
PATH REPORT.FINAL DX SPEC: NORMAL
PATH REPORT.GROSS SPEC: NORMAL
PATH REPORT.TOTAL CANCER: NORMAL

## 2025-03-24 ENCOUNTER — APPOINTMENT (OUTPATIENT)
Dept: PRIMARY CARE | Facility: CLINIC | Age: 70
End: 2025-03-24
Payer: MEDICARE

## 2025-03-24 VITALS
DIASTOLIC BLOOD PRESSURE: 70 MMHG | WEIGHT: 147.6 LBS | OXYGEN SATURATION: 98 % | RESPIRATION RATE: 16 BRPM | BODY MASS INDEX: 27.16 KG/M2 | HEIGHT: 62 IN | TEMPERATURE: 98 F | SYSTOLIC BLOOD PRESSURE: 120 MMHG | HEART RATE: 85 BPM

## 2025-03-24 DIAGNOSIS — R06.09 DYSPNEA ON EXERTION: ICD-10-CM

## 2025-03-24 DIAGNOSIS — N18.31 CHRONIC KIDNEY DISEASE, STAGE 3A (MULTI): ICD-10-CM

## 2025-03-24 DIAGNOSIS — M25.512 BILATERAL SHOULDER PAIN, UNSPECIFIED CHRONICITY: Primary | ICD-10-CM

## 2025-03-24 DIAGNOSIS — Z12.83 SKIN CANCER SCREENING: ICD-10-CM

## 2025-03-24 DIAGNOSIS — R00.2 PALPITATIONS: ICD-10-CM

## 2025-03-24 DIAGNOSIS — R53.83 OTHER FATIGUE: ICD-10-CM

## 2025-03-24 DIAGNOSIS — E11.9 DIABETES MELLITUS TYPE 2 WITHOUT RETINOPATHY (MULTI): ICD-10-CM

## 2025-03-24 DIAGNOSIS — I10 ESSENTIAL HYPERTENSION: ICD-10-CM

## 2025-03-24 DIAGNOSIS — E55.9 VITAMIN D DEFICIENCY: ICD-10-CM

## 2025-03-24 DIAGNOSIS — M25.511 BILATERAL SHOULDER PAIN, UNSPECIFIED CHRONICITY: Primary | ICD-10-CM

## 2025-03-24 DIAGNOSIS — E78.2 MIXED HYPERLIPIDEMIA: ICD-10-CM

## 2025-03-24 PROCEDURE — 1159F MED LIST DOCD IN RCRD: CPT | Performed by: INTERNAL MEDICINE

## 2025-03-24 PROCEDURE — 99214 OFFICE O/P EST MOD 30 MIN: CPT | Performed by: INTERNAL MEDICINE

## 2025-03-24 PROCEDURE — 4010F ACE/ARB THERAPY RXD/TAKEN: CPT | Performed by: INTERNAL MEDICINE

## 2025-03-24 PROCEDURE — 1160F RVW MEDS BY RX/DR IN RCRD: CPT | Performed by: INTERNAL MEDICINE

## 2025-03-24 PROCEDURE — 3008F BODY MASS INDEX DOCD: CPT | Performed by: INTERNAL MEDICINE

## 2025-03-24 PROCEDURE — 3078F DIAST BP <80 MM HG: CPT | Performed by: INTERNAL MEDICINE

## 2025-03-24 PROCEDURE — 1036F TOBACCO NON-USER: CPT | Performed by: INTERNAL MEDICINE

## 2025-03-24 PROCEDURE — 3074F SYST BP LT 130 MM HG: CPT | Performed by: INTERNAL MEDICINE

## 2025-03-24 PROCEDURE — 1123F ACP DISCUSS/DSCN MKR DOCD: CPT | Performed by: INTERNAL MEDICINE

## 2025-03-24 PROCEDURE — G2211 COMPLEX E/M VISIT ADD ON: HCPCS | Performed by: INTERNAL MEDICINE

## 2025-03-24 ASSESSMENT — ENCOUNTER SYMPTOMS
DIZZINESS: 0
NECK STIFFNESS: 0
COUGH: 0
NEUROLOGICAL NEGATIVE: 1
BLOOD IN STOOL: 0
WEAKNESS: 0
TROUBLE SWALLOWING: 0
STRIDOR: 0
PALPITATIONS: 1
SINUS PRESSURE: 0
BRUISES/BLEEDS EASILY: 0
SHORTNESS OF BREATH: 1
DYSURIA: 0
COLOR CHANGE: 0
ALLERGIC/IMMUNOLOGIC NEGATIVE: 1
NUMBNESS: 0
NERVOUS/ANXIOUS: 0
EYES NEGATIVE: 1
WHEEZING: 0
CONSTIPATION: 0
FACIAL ASYMMETRY: 0
CHEST TIGHTNESS: 0
FREQUENCY: 0
FLANK PAIN: 0
ABDOMINAL PAIN: 0
POLYPHAGIA: 0
HALLUCINATIONS: 0
UNEXPECTED WEIGHT CHANGE: 0
CONSTITUTIONAL NEGATIVE: 1
ADENOPATHY: 0
ENDOCRINE NEGATIVE: 1
VOMITING: 0
NECK PAIN: 0
EYE DISCHARGE: 0
APPETITE CHANGE: 0
EYE PAIN: 0
AGITATION: 0
TREMORS: 0
GASTROINTESTINAL NEGATIVE: 1
ACTIVITY CHANGE: 0
WOUND: 0
PSYCHIATRIC NEGATIVE: 1
CONFUSION: 0
SPEECH DIFFICULTY: 0
DIFFICULTY URINATING: 0
SEIZURES: 0
SLEEP DISTURBANCE: 0
SORE THROAT: 0
LIGHT-HEADEDNESS: 0
ARTHRALGIAS: 1
POLYDIPSIA: 0
MYALGIAS: 0
DIARRHEA: 0
HEADACHES: 0
EYE REDNESS: 0
HEMATOLOGIC/LYMPHATIC NEGATIVE: 1
VOICE CHANGE: 0
SINUS PAIN: 0
BACK PAIN: 0
JOINT SWELLING: 0
NAUSEA: 0

## 2025-03-24 NOTE — PROGRESS NOTES
Subjective   Patient ID: Catie Burns is a 69 y.o. female who presents for Follow-up (Pt is here due to a 3 month follow up).    HPI   This is 68 yo patient with complex medical problems including HTN, HLD, DM, osteopenia.    Patient has been feeling pretty good and has been complaint with prescribed medications.    C/o pain in both shoulders worse at nights.  Will refer to ortho.    We reviewed and discussed details of recent blood work: CBC, CMP, TSH, Lipid panel, Hb A1c, Vit D done in 2024.  Results within acceptable range.    Colonoscopy showed a polyp which was removed, next colonoscopy in 5 years.    C/o growing skin lesion on external aspect of left eye.     Also concerned about getting SOB easily and having occasional palpations/fluttering. Will refer to cardiology.   Her ECG showed possible inferior infarct (noted in 2024 and on previous ECG's)  She underwent perfusion stress test in July 2023 which was normal.     She gets SOB with exertion, most likely due to poor exercise tolerance and sedentary lifestyle.    I advised to start exercise program and gradually increase level of exertion. Advised to diony heart rate to at least 120 bpm when exercising.   She retired from  on 11/01/2024.     Patient was diagnosed with fatty liver, saw GI, had Fiberscan done which showed steatosis and 0 fibrosis.     We discussed importance of weight loss.     Patient may be a candidate for GLP1-a, follows with  [facility] pharmacist, we will consider  initiation of Ozempic or Mounjaro.    Patient has been following with [facility] pharmacist, I have been communicating with pharmacist regularly and supervising treatment.      She went to ER on 3/13/24 with c/o abdominal pain and rectal bleeding.   CT abdomen and pelvis was done, no acute findings.   Urine cultures were positive for UTI, started on Cephalexin.          Review of Systems   Constitutional: Negative.  Negative for activity change, appetite change and unexpected weight  "change.   HENT: Negative.  Negative for congestion, ear discharge, ear pain, hearing loss, mouth sores, nosebleeds, sinus pressure, sinus pain, sore throat, trouble swallowing and voice change.    Eyes: Negative.  Negative for pain, discharge, redness and visual disturbance.   Respiratory:  Positive for shortness of breath. Negative for cough, chest tightness, wheezing and stridor.    Cardiovascular:  Positive for palpitations. Negative for chest pain and leg swelling.   Gastrointestinal: Negative.  Negative for abdominal pain, blood in stool, constipation, diarrhea, nausea and vomiting.   Endocrine: Negative.  Negative for polydipsia, polyphagia and polyuria.   Genitourinary: Negative.  Negative for difficulty urinating, dysuria, flank pain, frequency and urgency.   Musculoskeletal:  Positive for arthralgias. Negative for back pain, gait problem, joint swelling, myalgias, neck pain and neck stiffness.   Skin: Negative.  Negative for color change, rash and wound.   Allergic/Immunologic: Negative.  Negative for environmental allergies, food allergies and immunocompromised state.   Neurological: Negative.  Negative for dizziness, tremors, seizures, syncope, facial asymmetry, speech difficulty, weakness, light-headedness, numbness and headaches.   Hematological: Negative.  Negative for adenopathy. Does not bruise/bleed easily.   Psychiatric/Behavioral: Negative.  Negative for agitation, behavioral problems, confusion, hallucinations, sleep disturbance and suicidal ideas. The patient is not nervous/anxious.    All other systems reviewed and are negative.      Objective   /70 (BP Location: Right arm, Patient Position: Sitting, BP Cuff Size: Adult)   Pulse 85   Temp 36.7 °C (98 °F) (Temporal)   Resp 16   Ht 1.575 m (5' 2\")   Wt 67 kg (147 lb 9.6 oz)   SpO2 98%   BMI 27.00 kg/m²     Physical Exam  Vitals and nursing note reviewed.   Constitutional:       General: She is not in acute distress.     Appearance: " Normal appearance.   HENT:      Head: Normocephalic and atraumatic.      Right Ear: External ear normal.      Left Ear: External ear normal.      Nose: Nose normal. No congestion or rhinorrhea.   Eyes:      General:         Right eye: No discharge.         Left eye: No discharge.      Extraocular Movements: Extraocular movements intact.      Conjunctiva/sclera: Conjunctivae normal.      Pupils: Pupils are equal, round, and reactive to light.   Cardiovascular:      Rate and Rhythm: Normal rate and regular rhythm.      Pulses: Normal pulses.      Heart sounds: Normal heart sounds. No murmur heard.     No friction rub. No gallop.   Pulmonary:      Effort: Pulmonary effort is normal. No respiratory distress.      Breath sounds: Normal breath sounds. No stridor. No wheezing, rhonchi or rales.   Chest:      Chest wall: No tenderness.   Abdominal:      General: Bowel sounds are normal.      Palpations: Abdomen is soft. There is no mass.      Tenderness: There is no abdominal tenderness. There is no guarding or rebound.   Musculoskeletal:         General: No swelling or deformity. Normal range of motion.      Cervical back: Normal range of motion and neck supple. No rigidity or tenderness.      Right lower leg: No edema.      Left lower leg: No edema.   Lymphadenopathy:      Cervical: No cervical adenopathy.   Skin:     General: Skin is warm and dry.      Coloration: Skin is not jaundiced.      Findings: No bruising or erythema.      Comments: Skin growth about 2 mm noted on external aspect of left eye.    Neurological:      General: No focal deficit present.      Mental Status: She is alert and oriented to person, place, and time. Mental status is at baseline.      Cranial Nerves: No cranial nerve deficit.      Motor: No weakness.      Coordination: Coordination normal.      Gait: Gait normal.   Psychiatric:         Mood and Affect: Mood normal.         Behavior: Behavior normal.         Thought Content: Thought content  normal.         Judgment: Judgment normal.         Assessment/Plan   Problem List Items Addressed This Visit             ICD-10-CM       Cardiac and Vasculature    Essential hypertension I10     Controlled. Continue current medication.                Hyperlipidemia E78.5     Clinically stable. Continue statin.               Relevant Orders    Comprehensive Metabolic Panel    Lipid Panel    TSH with reflex to Free T4 if abnormal    Dyspnea on exertion R06.09    Relevant Orders    Referral to Cardiology       Endocrine/Metabolic    Diabetes mellitus type 2 without retinopathy (Multi) E11.9     Stable. F/up with [facility] pharmacist.          Relevant Orders    Hemoglobin A1C    Albumin-Creatinine Ratio, Urine Random    Vitamin D deficiency E55.9    Relevant Orders    Vitamin D 25-Hydroxy,Total (for eval of Vitamin D levels)       Genitourinary and Reproductive    Chronic kidney disease, stage 3a (Multi) N18.31     Stable. Will monitor.             Symptoms and Signs    Fatigue R53.83    Relevant Orders    CBC     Other Visit Diagnoses         Codes    Bilateral shoulder pain, unspecified chronicity    -  Primary M25.511, M25.512    Relevant Orders    Referral to Orthopedics and Sports Medicine    Skin cancer screening     Z12.83    Relevant Orders    Referral to Dermatology    Palpitations     R00.2    Relevant Orders    Referral to Cardiology        It was a pleasure to see you today.  I would like to remind you about importance of a healthy lifestyle in order to improve your well-being and live longer.  Try to engage in physical activities for at least 150 minutes per week.  Eat about 10 servings of fruits and vegetables daily. My advice is 2 servings of fruits and 8 servings of vegetables.  For vegetables choose at least half of them green and at least half of them fresh.  Please avoid sugar, salt, fried food and saturated fat.    I spent a total of 30 minutes on the date of service for follow up visit, which included  preparing to see the patient, face-to-face patient care, completing clinical documentation, obtaining and/or reviewing separately obtained history, performing a medically appropriate examination, counseling and educating the patient/family/caregiver, ordering medications, tests, or procedures, communicating with other health care providers (not separately reported), independently interpreting results (not separately reported), communicating results to the patient/family/caregiver, and care coordination (not separately reported).    F/up in 3-4 months or sooner if needed.

## 2025-03-26 PROCEDURE — RXMED WILLOW AMBULATORY MEDICATION CHARGE

## 2025-03-31 ENCOUNTER — PHARMACY VISIT (OUTPATIENT)
Dept: PHARMACY | Facility: CLINIC | Age: 70
End: 2025-03-31
Payer: COMMERCIAL

## 2025-04-13 LAB
25(OH)D3+25(OH)D2 SERPL-MCNC: 19 NG/ML (ref 30–100)
ALBUMIN SERPL-MCNC: 4.8 G/DL (ref 3.6–5.1)
ALP SERPL-CCNC: 84 U/L (ref 37–153)
ALT SERPL-CCNC: 24 U/L (ref 6–29)
ANION GAP SERPL CALCULATED.4IONS-SCNC: 10 MMOL/L (CALC) (ref 7–17)
AST SERPL-CCNC: 23 U/L (ref 10–35)
BILIRUB SERPL-MCNC: 0.7 MG/DL (ref 0.2–1.2)
BUN SERPL-MCNC: 19 MG/DL (ref 7–25)
CALCIUM SERPL-MCNC: 9.7 MG/DL (ref 8.6–10.4)
CHLORIDE SERPL-SCNC: 101 MMOL/L (ref 98–110)
CHOLEST SERPL-MCNC: 167 MG/DL
CHOLEST/HDLC SERPL: 3 (CALC)
CO2 SERPL-SCNC: 28 MMOL/L (ref 20–32)
CREAT SERPL-MCNC: 1.19 MG/DL (ref 0.5–1.05)
EGFRCR SERPLBLD CKD-EPI 2021: 50 ML/MIN/1.73M2
ERYTHROCYTE [DISTWIDTH] IN BLOOD BY AUTOMATED COUNT: 12.5 % (ref 11–15)
EST. AVERAGE GLUCOSE BLD GHB EST-MCNC: 174 MG/DL
EST. AVERAGE GLUCOSE BLD GHB EST-SCNC: 9.7 MMOL/L
GLUCOSE SERPL-MCNC: 142 MG/DL (ref 65–99)
HBA1C MFR BLD: 7.7 % OF TOTAL HGB
HCT VFR BLD AUTO: 42.7 % (ref 35–45)
HDLC SERPL-MCNC: 55 MG/DL
HGB BLD-MCNC: 14.3 G/DL (ref 11.7–15.5)
LDLC SERPL CALC-MCNC: 94 MG/DL (CALC)
MCH RBC QN AUTO: 30.4 PG (ref 27–33)
MCHC RBC AUTO-ENTMCNC: 33.5 G/DL (ref 32–36)
MCV RBC AUTO: 90.9 FL (ref 80–100)
NONHDLC SERPL-MCNC: 112 MG/DL (CALC)
PLATELET # BLD AUTO: 256 THOUSAND/UL (ref 140–400)
PMV BLD REES-ECKER: 12.2 FL (ref 7.5–12.5)
POTASSIUM SERPL-SCNC: 5.3 MMOL/L (ref 3.5–5.3)
PROT SERPL-MCNC: 7.2 G/DL (ref 6.1–8.1)
RBC # BLD AUTO: 4.7 MILLION/UL (ref 3.8–5.1)
SODIUM SERPL-SCNC: 139 MMOL/L (ref 135–146)
TRIGL SERPL-MCNC: 87 MG/DL
TSH SERPL-ACNC: 1.67 MIU/L (ref 0.4–4.5)
WBC # BLD AUTO: 7.7 THOUSAND/UL (ref 3.8–10.8)

## 2025-04-14 ENCOUNTER — TELEPHONE (OUTPATIENT)
Dept: PRIMARY CARE | Facility: CLINIC | Age: 70
End: 2025-04-14
Payer: MEDICARE

## 2025-04-14 LAB
ALBUMIN/CREAT UR: 13 MG/G CREAT
CREAT UR-MCNC: 135 MG/DL (ref 20–275)
MICROALBUMIN UR-MCNC: 1.7 MG/DL

## 2025-04-14 NOTE — TELEPHONE ENCOUNTER
Pt stated that she had labs/urine done on Saturday - ans has been sick since last week - stomach pain/back pain - would like to know if Dr Thakkar sees anything in the labs that could tell if maybe she has a kidney infection - please advise

## 2025-04-15 ENCOUNTER — OFFICE VISIT (OUTPATIENT)
Dept: PRIMARY CARE | Facility: CLINIC | Age: 70
End: 2025-04-15
Payer: MEDICARE

## 2025-04-15 VITALS
TEMPERATURE: 98 F | BODY MASS INDEX: 27.49 KG/M2 | RESPIRATION RATE: 16 BRPM | HEART RATE: 87 BPM | OXYGEN SATURATION: 98 % | SYSTOLIC BLOOD PRESSURE: 120 MMHG | DIASTOLIC BLOOD PRESSURE: 80 MMHG | WEIGHT: 149.4 LBS | HEIGHT: 62 IN

## 2025-04-15 DIAGNOSIS — K76.0 STEATOSIS OF LIVER: ICD-10-CM

## 2025-04-15 DIAGNOSIS — I10 ESSENTIAL HYPERTENSION: ICD-10-CM

## 2025-04-15 DIAGNOSIS — Q25.40 ABNORMALITY OF THORACIC AORTA (HHS-HCC): ICD-10-CM

## 2025-04-15 DIAGNOSIS — E78.2 MIXED HYPERLIPIDEMIA: ICD-10-CM

## 2025-04-15 DIAGNOSIS — R10.31 RIGHT LOWER QUADRANT ABDOMINAL PAIN: ICD-10-CM

## 2025-04-15 DIAGNOSIS — R10.11 RIGHT UPPER QUADRANT ABDOMINAL PAIN: Primary | ICD-10-CM

## 2025-04-15 PROCEDURE — 3079F DIAST BP 80-89 MM HG: CPT | Performed by: INTERNAL MEDICINE

## 2025-04-15 PROCEDURE — 1036F TOBACCO NON-USER: CPT | Performed by: INTERNAL MEDICINE

## 2025-04-15 PROCEDURE — 3074F SYST BP LT 130 MM HG: CPT | Performed by: INTERNAL MEDICINE

## 2025-04-15 PROCEDURE — 1160F RVW MEDS BY RX/DR IN RCRD: CPT | Performed by: INTERNAL MEDICINE

## 2025-04-15 PROCEDURE — 3008F BODY MASS INDEX DOCD: CPT | Performed by: INTERNAL MEDICINE

## 2025-04-15 PROCEDURE — G2211 COMPLEX E/M VISIT ADD ON: HCPCS | Performed by: INTERNAL MEDICINE

## 2025-04-15 PROCEDURE — 1159F MED LIST DOCD IN RCRD: CPT | Performed by: INTERNAL MEDICINE

## 2025-04-15 PROCEDURE — 4010F ACE/ARB THERAPY RXD/TAKEN: CPT | Performed by: INTERNAL MEDICINE

## 2025-04-15 PROCEDURE — 99214 OFFICE O/P EST MOD 30 MIN: CPT | Performed by: INTERNAL MEDICINE

## 2025-04-15 PROCEDURE — 1123F ACP DISCUSS/DSCN MKR DOCD: CPT | Performed by: INTERNAL MEDICINE

## 2025-04-15 ASSESSMENT — ENCOUNTER SYMPTOMS
NAUSEA: 1
DIFFICULTY URINATING: 0
WOUND: 0
ARTHRALGIAS: 0
SEIZURES: 0
COUGH: 0
ADENOPATHY: 0
SORE THROAT: 0
DIZZINESS: 0
NEUROLOGICAL NEGATIVE: 1
STRIDOR: 0
NECK PAIN: 0
SLEEP DISTURBANCE: 0
BACK PAIN: 0
APPETITE CHANGE: 0
HEADACHES: 0
BRUISES/BLEEDS EASILY: 0
CONSTIPATION: 0
ENDOCRINE NEGATIVE: 1
PSYCHIATRIC NEGATIVE: 1
CHEST TIGHTNESS: 0
EYE PAIN: 0
JOINT SWELLING: 0
AGITATION: 0
ALLERGIC/IMMUNOLOGIC NEGATIVE: 1
NECK STIFFNESS: 0
NERVOUS/ANXIOUS: 0
ABDOMINAL PAIN: 1
CONSTITUTIONAL NEGATIVE: 1
DYSURIA: 0
COLOR CHANGE: 0
RESPIRATORY NEGATIVE: 1
TROUBLE SWALLOWING: 0
EYES NEGATIVE: 1
VOICE CHANGE: 0
FLANK PAIN: 0
HALLUCINATIONS: 0
EYE REDNESS: 0
SINUS PAIN: 0
CARDIOVASCULAR NEGATIVE: 1
PALPITATIONS: 0
VOMITING: 0
CONFUSION: 0
NUMBNESS: 0
SHORTNESS OF BREATH: 0
MUSCULOSKELETAL NEGATIVE: 1
WHEEZING: 0
POLYDIPSIA: 0
EYE DISCHARGE: 0
WEAKNESS: 0
FREQUENCY: 0
LIGHT-HEADEDNESS: 0
POLYPHAGIA: 0
BLOOD IN STOOL: 0
UNEXPECTED WEIGHT CHANGE: 0
ACTIVITY CHANGE: 0
TREMORS: 0
FACIAL ASYMMETRY: 0
SPEECH DIFFICULTY: 0
DIARRHEA: 1
MYALGIAS: 0
SINUS PRESSURE: 0
HEMATOLOGIC/LYMPHATIC NEGATIVE: 1

## 2025-04-15 ASSESSMENT — PATIENT HEALTH QUESTIONNAIRE - PHQ9
1. LITTLE INTEREST OR PLEASURE IN DOING THINGS: NOT AT ALL
SUM OF ALL RESPONSES TO PHQ9 QUESTIONS 1 AND 2: 0
2. FEELING DOWN, DEPRESSED OR HOPELESS: NOT AT ALL

## 2025-04-15 NOTE — PROGRESS NOTES
Subjective   Patient ID: Catie Burns is a 69 y.o. female who presents for UTI (Pt is here due to UTI .).    UTI   Associated symptoms include nausea. Pertinent negatives include no flank pain, frequency, urgency or vomiting.        This is 70 yo patient with complex medical problems including HTN, HLD, DM, osteopenia.     We reviewed her medical conditions during today's visit.    Patient has beeen c/o right sided abdominal pain for almost a week, getting worse. Initially it was generalized pain with some nausea and diarrhea, now more on right side. She had her appendix and gallbladder removed.   No h/o kidney stones, no blood in stool or urine, no dysuria.  She had colonoscopy done about 2 weeks ago which showed diverticulosis, small polyp was removed.     Denies fever or chills, has been feeling tired.    We reviewed and discussed details of recent blood work: CBC, CMP, TSH, Lipid panel, Hb A1c done in April 2025.  Results within acceptable range.    Patient was  concerned about getting SOB easily and having occasional palpations/fluttering. Was referred to cardiology.   Her ECG showed possible inferior infarct (noted in 2024 and on previous ECG's)  She underwent perfusion stress test in July 2023 which was normal.     She gets SOB with exertion, most likely due to poor exercise tolerance and sedentary lifestyle.     I advised to start exercise program and gradually increase level of exertion. Advised to diony heart rate to at least 120 bpm when exercising.   She retired from  on 11/01/2024.     Patient was diagnosed with fatty liver, saw GI, had Fiberscan done which showed steatosis and 0 fibrosis.     We discussed importance of weight loss.     Patient may be a candidate for GLP1-a, follows with  [facility] pharmacist, we will consider  initiation of Ozempic or Mounjaro.     Patient has been following with [facility] pharmacist, I have been communicating with pharmacist regularly and supervising treatment.      She went  to ER on 3/13/24 with c/o abdominal pain and rectal bleeding.   CT abdomen and pelvis was done, no acute findings.   Urine cultures were positive for UTI, started on Cephalexin.           Review of Systems   Constitutional: Negative.  Negative for activity change, appetite change and unexpected weight change.   HENT: Negative.  Negative for congestion, ear discharge, ear pain, hearing loss, mouth sores, nosebleeds, sinus pressure, sinus pain, sore throat, trouble swallowing and voice change.    Eyes: Negative.  Negative for pain, discharge, redness and visual disturbance.   Respiratory: Negative.  Negative for cough, chest tightness, shortness of breath, wheezing and stridor.    Cardiovascular: Negative.  Negative for chest pain, palpitations and leg swelling.   Gastrointestinal:  Positive for abdominal pain, diarrhea and nausea. Negative for blood in stool, constipation and vomiting.   Endocrine: Negative.  Negative for polydipsia, polyphagia and polyuria.   Genitourinary: Negative.  Negative for difficulty urinating, dysuria, flank pain, frequency and urgency.   Musculoskeletal: Negative.  Negative for arthralgias, back pain, gait problem, joint swelling, myalgias, neck pain and neck stiffness.   Skin: Negative.  Negative for color change, rash and wound.   Allergic/Immunologic: Negative.  Negative for environmental allergies, food allergies and immunocompromised state.   Neurological: Negative.  Negative for dizziness, tremors, seizures, syncope, facial asymmetry, speech difficulty, weakness, light-headedness, numbness and headaches.   Hematological: Negative.  Negative for adenopathy. Does not bruise/bleed easily.   Psychiatric/Behavioral: Negative.  Negative for agitation, behavioral problems, confusion, hallucinations, sleep disturbance and suicidal ideas. The patient is not nervous/anxious.    All other systems reviewed and are negative.      Objective   /80 (BP Location: Right arm, Patient Position:  "Sitting, BP Cuff Size: Adult)   Pulse 87   Temp 36.7 °C (98 °F) (Temporal)   Resp 16   Ht 1.575 m (5' 2\")   Wt 67.8 kg (149 lb 6.4 oz)   SpO2 98%   BMI 27.33 kg/m²     Physical Exam  Vitals and nursing note reviewed.   Constitutional:       General: She is not in acute distress.     Appearance: Normal appearance.   HENT:      Head: Normocephalic and atraumatic.      Right Ear: External ear normal.      Left Ear: External ear normal.      Nose: Nose normal. No congestion or rhinorrhea.   Eyes:      General:         Right eye: No discharge.         Left eye: No discharge.      Extraocular Movements: Extraocular movements intact.      Conjunctiva/sclera: Conjunctivae normal.      Pupils: Pupils are equal, round, and reactive to light.   Cardiovascular:      Rate and Rhythm: Normal rate and regular rhythm.      Pulses: Normal pulses.      Heart sounds: Normal heart sounds. No murmur heard.     No friction rub. No gallop.   Pulmonary:      Effort: Pulmonary effort is normal. No respiratory distress.      Breath sounds: Normal breath sounds. No stridor. No wheezing, rhonchi or rales.   Chest:      Chest wall: No tenderness.   Abdominal:      General: Bowel sounds are normal.      Palpations: Abdomen is soft. There is no mass.      Tenderness: There is abdominal tenderness in the right upper quadrant and right lower quadrant. There is no guarding or rebound.   Musculoskeletal:         General: No swelling or deformity. Normal range of motion.      Cervical back: Normal range of motion and neck supple. No rigidity or tenderness.      Right lower leg: No edema.      Left lower leg: No edema.   Lymphadenopathy:      Cervical: No cervical adenopathy.   Skin:     General: Skin is warm and dry.      Coloration: Skin is not jaundiced.      Findings: No bruising or erythema.   Neurological:      General: No focal deficit present.      Mental Status: She is alert and oriented to person, place, and time. Mental status is at " baseline.      Cranial Nerves: No cranial nerve deficit.      Motor: No weakness.      Coordination: Coordination normal.      Gait: Gait normal.   Psychiatric:         Mood and Affect: Mood normal.         Behavior: Behavior normal.         Thought Content: Thought content normal.         Judgment: Judgment normal.         Assessment/Plan   Problem List Items Addressed This Visit             ICD-10-CM       Cardiac and Vasculature    Essential hypertension I10     Controlled. Continue current medication.                Hyperlipidemia E78.5     Clinically stable. Continue statin.               Abnormality of thoracic aorta (HHS-HCC) Q25.40     Clinically stable, will monitor.             Gastrointestinal and Abdominal    Steatosis of liver K76.0     Stable. Low carbohydrate diet is advised. F/up with GI.         Right upper quadrant abdominal pain - Primary R10.11     Obtain STAT CT scan, go to ER if symptoms worsen.         Relevant Orders    CT abdomen pelvis wo IV contrast    Right lower quadrant abdominal pain R10.31     Obtain STAT CT scan, go to ER if symptoms worsen.         Relevant Orders    CT abdomen pelvis wo IV contrast     It was a pleasure to see you today.  I would like to remind you about importance of a healthy lifestyle in order to improve your well-being and live longer.  Try to engage in physical activities for at least 150 minutes per week.  Eat about 10 servings of fruits and vegetables daily. My advice is 2 servings of fruits and 8 servings of vegetables.  For vegetables choose at least half of them green and at least half of them fresh.  Please avoid sugar, salt, fried food and saturated fat.    I spent a total of 30 minutes on the date of service for follow up visit, which included preparing to see the patient, face-to-face patient care, completing clinical documentation, obtaining and/or reviewing separately obtained history, performing a medically appropriate examination, counseling and  educating the patient/family/caregiver, ordering medications, tests, or procedures, communicating with other health care providers (not separately reported), independently interpreting results (not separately reported), communicating results to the patient/family/caregiver, and care coordination (not separately reported).    F/up in 2 weeks or sooner if needed.

## 2025-04-16 ENCOUNTER — HOSPITAL ENCOUNTER (OUTPATIENT)
Dept: RADIOLOGY | Facility: CLINIC | Age: 70
Discharge: HOME | End: 2025-04-16
Payer: MEDICARE

## 2025-04-16 DIAGNOSIS — R10.31 RIGHT LOWER QUADRANT ABDOMINAL PAIN: ICD-10-CM

## 2025-04-16 DIAGNOSIS — R10.11 RIGHT UPPER QUADRANT ABDOMINAL PAIN: ICD-10-CM

## 2025-04-16 PROCEDURE — 74176 CT ABD & PELVIS W/O CONTRAST: CPT

## 2025-04-16 NOTE — RESULT ENCOUNTER NOTE
Please call with results:  Your recent CT abdomen and pelvis did not show acute abnormalities.  Please follow BRAT diet, take Mylanta as needed, go to ER if symptoms worsen.  Follow up with me in 1-2 weeks.  Dr. Thakkar

## 2025-04-18 ENCOUNTER — APPOINTMENT (OUTPATIENT)
Dept: PHARMACY | Facility: HOSPITAL | Age: 70
End: 2025-04-18
Payer: MEDICARE

## 2025-04-24 ENCOUNTER — TELEMEDICINE (OUTPATIENT)
Dept: PHARMACY | Facility: HOSPITAL | Age: 70
End: 2025-04-24
Payer: MEDICARE

## 2025-04-24 DIAGNOSIS — E11.9 DIABETES MELLITUS TYPE 2 WITHOUT RETINOPATHY (MULTI): ICD-10-CM

## 2025-04-24 NOTE — ASSESSMENT & PLAN NOTE
Patient's goal A1c is < 7%.  Patient is above goal with most recent A1c of 7.7% on 04/12/25, which was a decrease from 8.2% on 12/18/24.  Patient's fasting blood glucose is generally within goal - however was not able to assess patient's post prandial blood glucose at this encounter.   Patient had been ill early April and stopped metformin per Dr. Thakkar's instructions momentarily and has resumed taking metformin as directed.   Denied any adverse side effects after restarting metformin.   Denied any s/sx of hypoglycemia      Rationale for plan:   Since unable to assess patient's post prandial glucose will defer from making medication recommendations at this time.   Reviewed with patient the importance of getting an overall view of her blood glucose and required to determine if additional adjustments to medications are needed to help maintain a stable glucose level that is within goal throughout the day.   Instructed patient to test blood glucose 2 hours after her heaviest meal and patient agreed.   Initiated discussion if post-prandial glucose is above goal, medications such as GLP-1 agonists have shown great effectiveness with lowering after meal glucose levels and glycemic control overall.  Patient does not like injections, therefore discussed there is an oral option, Rybelsus. Provided patient the name so that she can research the medication on her own. Discussed benefits versus risks of medication and concerns of having uncontrolled diabetes.   Will revisit discussion at next follow up if patient is amenable.  Will follow up in one month to assess glycemic control and if patient is open to starting GLP-1 agonist.     Medication Changes:  CONTINUE  Metformin  mg - 2 tablets BID     Future Considerations:  Patient recently treated for UTI - re-trial of Jardiance may not be the best option at this time.   Patient would be good candidate for GLP-1 therapy, however has concerns for side effects. If patient  absolutely refuses, may consider DPP4-inhibitor.

## 2025-04-24 NOTE — PROGRESS NOTES
"  Clinical Pharmacy Appointment    Patient ID: Pooja Burns \"Catie\" is a 69 y.o. female who presents for diabetes management.    Pt is here for Follow Up appointment.     Referring Provider: Felicia Valero M*  PCP: Felicia Valero MD PhD   Last visit with PCP: 12/18/24   Next visit with PCP: 03/24/25      Subjective     Interval History  Patient referred for T2DM. Patient expressed hesitancy with starting new diabetes medication. Discussion of side effects and concerns were discussed at length, patient deferred to start new medications and agreed to increase metformin  mg to be titrated up to 1000 mg BID. Patient still has hesitancy with starting new diabetes medications, took some time to titrate patient to target metformin dose of 1000 mg BID.     At last clinical pharmacy encounter (03/14/25)- based on patient's reported home blood glucose readings, fasting and post-prandial BG generally seemed controlled, therefore no changes were made to medications at the time of encounter.      Last A1c: 7.7% (04/12/25)    HPI  DIABETES MELLITUS TYPE 2:    Diagnosed with diabetes:  ~10 years   Known diabetic complications: none.  Does patient follow with Endocrinology: No  Last optometry exam: over a year since last eye appointment - has eye exam scheduled 04/2025  Last foot exam: Podiatry visit  10/03/24 -- patient denies sores or cuts on feet today      Current diabetic medications include:  Metformin  mg - 2 tablets twice daily  Adherence: denies, uses a pill box - had been ill early April and held her metformin while she recovered, and has resumed her metformin ~ after 04/20  Adverse effects: denied      Past diabetic medications include:  Jardiance (discontinued after 1 week due to lightheadedness)  Glipizide (discontinued due to hypoglycemia at the time)  Trulicity (was prescribed but did not start due to not being comfortable with injections and the side effects)  Metformin at 4 tablets " daily (states blood sugars were too low)    Glucose Readings:  Glucometer/CGM Type: FreeStyle lite     Current home BG readings: tested fasting blood sugars only  DATE  FBG Comments                                     25 101     04/10/25 125     25 106     25 99     25 96     25 103     04/15/25 102     25 93     25 124 Had stopped metformin  25 130     25 127     25 130 Most likely resumed metformin at this time    25 136                                      Average FB    Previous home BG readings:   25:   DATE  FBG PPBG Comments                25 115 100   25 128 182 Carb heavy meal  25 108 174   25 106 83 missed a meal prior  25 122 164   25 114 218 Carb heavy meal  25 115 147   25 118 134   25 114 189 Carb heavy meal  25 103 150                                           Average fastin  Average post prandial: 154    25:  Average Fasting Glucose: 114 mg/dL    01/10/25:  DATE FASTING BG   1/10 128    124    115      24: self reported - fasting typically 140-150s     Any episodes of hypoglycemia? Denies  Did patient treat episode of hypoglycemia appropriately? N/A  Does the patient have a prescription for ready-to-use Glucagon? N/a    Does pt have proteinuria? No - UACR 8.1 ug/mg crt 24      Lifestyle:   Diet: 3 meals/day - No significant changes at this time  Been watching her diet more now retired  Breakfast: Eggs, toast, orange, coffee with cream, water   Lunch: Liberal (grilled cheese) or soup, fruit   Dinner: Usually a protein, salad/vegetable, carb   Snacks: pretzels, fruit, celery w/ cream cheese, carrots with dip, nuts  Drinks: Juice occasionally, drinks more water     Physical Activity: Recovering from being ill, has not been able to do much  Walking more now she is retired - 40 -60 min a day, moving around more     Primary/Secondary  Prevention:  Statin? Yes - rosuvastatin 5 mg   ACE-I/ARB? Yes - lisinopril 5 mg   Aspirin? Yes     Pertinent PMH Review:  PMH of Pancreatitis: No  PMH of Retinopathy: No  PMH of Urinary Tract Infections: Yes - most recent 07/2024  PMH of MTC/MEN2: No  PMH of gastroparesis/severe GI diseases: No    Drug Interactions  No relevant drug interactions were noted.     Medication System Management  Patient's preferred pharmacy:   [facility]  [address] , [address]  [facility] [address]  Phone: [phone] Fax: [phone]     [facility] - [place] - [facility] AT Portal to Registered [facility] Sites  [facility]  [place] [address]  Phone: [phone] Fax: [phone]      Objective   Allergies   Allergen Reactions    Atorvastatin Myalgia    Lovastatin Myalgia    Dicyclomine Rash     Social History     Social History Narrative    Not on file      Medication Review  Current Outpatient Medications   Medication Instructions    aspirin 81 mg EC tablet 1 tablet, Daily    azelastine (Astelin) 137 mcg (0.1 %) nasal spray ADMINISTER 2 SPRAYS INTO EACH NOSTRIL IN THE MORNING AND 2 SPRAYS BEFORE BEDTIME.    Blood glucose monitoring meter Use as directed to check blood glucose    blood sugar diagnostic strip Use to check blood sugar once daily as directed    famotidine (Pepcid) 20 mg tablet TAKE 1 TABLET (20 MG) BY MOUTH 2 TIMES A DAY.    FreeStyle lancets 28 gauge Use as instructed once daily    hydroCHLOROthiazide (HYDRODIURIL) 25 mg, oral, Daily    lisinopril 5 mg, oral, Daily    metFORMIN XR (GLUCOPHAGE-XR) 1,000 mg, oral, 2 times daily, Take with meals    potassium chloride CR (Klor-Con) 10 mEq ER tablet 10 mEq, oral, Daily    rosuvastatin (CRESTOR) 5 mg, oral, Nightly    sod sulf-pot chloride-mag sulf (Sutab) 1.479-0.188- 0.225 gram tablet tablet 12 tablets, oral, See admin instructions      Vitals  BP Readings from Last 2 Encounters:   04/15/25 120/80    03/24/25 120/70     BMI Readings from Last 1 Encounters:   04/15/25 27.33 kg/m²      Labs  A1C  Lab Results   Component Value Date    HGBA1C 7.7 (H) 04/12/2025    HGBA1C 8.2 (A) 12/18/2024    HGBA1C 8.5 (H) 10/15/2024     BMP  Lab Results   Component Value Date    CALCIUM 9.7 04/12/2025     04/12/2025    K 5.3 04/12/2025    CO2 28 04/12/2025     04/12/2025    BUN 19 04/12/2025    CREATININE 1.19 (H) 04/12/2025    EGFR 50 (L) 04/12/2025     LFTs  Lab Results   Component Value Date    ALT 24 04/12/2025    AST 23 04/12/2025    ALKPHOS 84 04/12/2025    BILITOT 0.7 04/12/2025     FLP  Lab Results   Component Value Date    TRIG 87 04/12/2025    CHOL 167 04/12/2025    LDLF 120 (H) 07/08/2023    LDLCALC 94 04/12/2025    HDL 55 04/12/2025     Urine Microalbumin  Lab Results   Component Value Date    MICROALBCREA 13 04/12/2025     Weight Management  Wt Readings from Last 3 Encounters:   04/15/25 67.8 kg (149 lb 6.4 oz)   03/24/25 67 kg (147 lb 9.6 oz)   03/12/25 68 kg (150 lb)        Assessment/Plan   Problem List Items Addressed This Visit       Diabetes mellitus type 2 without retinopathy (Multi)    Patient's goal A1c is < 7%.  Patient is above goal with most recent A1c of 7.7% on 04/12/25, which was a decrease from 8.2% on 12/18/24.  Patient's fasting blood glucose is generally within goal - however was not able to assess patient's post prandial blood glucose at this encounter.   Patient had been ill early April and stopped metformin per Dr. Thakkar's instructions momentarily and has resumed taking metformin as directed.   Denied any adverse side effects after restarting metformin.   Denied any s/sx of hypoglycemia      Rationale for plan:   Since unable to assess patient's post prandial glucose will defer from making medication recommendations at this time.   Reviewed with patient the importance of getting an overall view of her blood glucose and required to determine if additional adjustments to medications  are needed to help maintain a stable glucose level that is within goal throughout the day.   Instructed patient to test blood glucose 2 hours after her heaviest meal and patient agreed.   Initiated discussion if post-prandial glucose is above goal, medications such as GLP-1 agonists have shown great effectiveness with lowering after meal glucose levels and glycemic control overall.  Patient does not like injections, therefore discussed there is an oral option, Rybelsus. Provided patient the name so that she can research the medication on her own. Discussed benefits versus risks of medication and concerns of having uncontrolled diabetes.   Will revisit discussion at next follow up if patient is amenable.  Will follow up in one month to assess glycemic control and if patient is open to starting GLP-1 agonist.     Medication Changes:  CONTINUE  Metformin  mg - 2 tablets BID     Future Considerations:  Patient recently treated for UTI - re-trial of Jardiance may not be the best option at this time.   Patient would be good candidate for GLP-1 therapy, however has concerns for side effects. If patient absolutely refuses, may consider DPP4-inhibitor.          Relevant Orders    Referral to Clinical Pharmacy     Clinical Pharmacist follow-up: 05/23/25, Telehealth visit    Continue all meds under the continuation of care with the referring provider and clinical pharmacy team.    Thank you,  Cyndi Lenz (Suji), PharmD  PGY-1  Meds Pharmacy Resident     Verbal consent to manage patient's drug therapy was obtained from the patient. They were informed they may decline to participate or withdraw from participation in pharmacy services at any time.

## 2025-04-25 ENCOUNTER — APPOINTMENT (OUTPATIENT)
Dept: OPHTHALMOLOGY | Facility: CLINIC | Age: 70
End: 2025-04-25
Payer: MEDICARE

## 2025-04-25 DIAGNOSIS — H52.03 HYPERMETROPIA OF BOTH EYES: Primary | ICD-10-CM

## 2025-04-25 DIAGNOSIS — H52.4 PRESBYOPIA: ICD-10-CM

## 2025-04-25 DIAGNOSIS — H25.13 NUCLEAR SCLEROTIC CATARACT OF BOTH EYES: ICD-10-CM

## 2025-04-25 DIAGNOSIS — E11.3293 MILD NONPROLIFERATIVE DIABETIC RETINOPATHY OF BOTH EYES WITHOUT MACULAR EDEMA ASSOCIATED WITH TYPE 2 DIABETES MELLITUS: ICD-10-CM

## 2025-04-25 PROBLEM — H90.3 SENSORINEURAL HEARING LOSS (SNHL) OF BOTH EARS: Status: ACTIVE | Noted: 2023-07-31

## 2025-04-25 PROCEDURE — 92015 DETERMINE REFRACTIVE STATE: CPT | Performed by: OPTOMETRIST

## 2025-04-25 PROCEDURE — 92014 COMPRE OPH EXAM EST PT 1/>: CPT | Performed by: OPTOMETRIST

## 2025-04-25 PROCEDURE — 92134 CPTRZ OPH DX IMG PST SGM RTA: CPT | Performed by: OPTOMETRIST

## 2025-04-25 PROCEDURE — 2022F DILAT RTA XM EVC RTNOPTHY: CPT | Performed by: OPTOMETRIST

## 2025-04-25 ASSESSMENT — REFRACTION_MANIFEST
OD_SPHERE: +0.75
OS_SPHERE: +1.00
OD_CYLINDER: SPHERE
OS_AXIS: 110
OS_ADD: +2.25
OD_ADD: +2.25
OS_CYLINDER: -0.25

## 2025-04-25 ASSESSMENT — CONF VISUAL FIELD
OD_INFERIOR_NASAL_RESTRICTION: 0
OS_INFERIOR_NASAL_RESTRICTION: 0
OS_SUPERIOR_TEMPORAL_RESTRICTION: 0
OS_INFERIOR_TEMPORAL_RESTRICTION: 0
OD_INFERIOR_TEMPORAL_RESTRICTION: 0
OS_NORMAL: 1
OD_NORMAL: 1
OS_SUPERIOR_NASAL_RESTRICTION: 0
OD_SUPERIOR_NASAL_RESTRICTION: 0
METHOD: COUNTING FINGERS
OD_SUPERIOR_TEMPORAL_RESTRICTION: 0

## 2025-04-25 ASSESSMENT — ENCOUNTER SYMPTOMS
RESPIRATORY NEGATIVE: 0
PSYCHIATRIC NEGATIVE: 0
ALLERGIC/IMMUNOLOGIC NEGATIVE: 0
NEUROLOGICAL NEGATIVE: 0
HEMATOLOGIC/LYMPHATIC NEGATIVE: 0
MUSCULOSKELETAL NEGATIVE: 0
ENDOCRINE NEGATIVE: 0
CARDIOVASCULAR NEGATIVE: 0
EYES NEGATIVE: 0
GASTROINTESTINAL NEGATIVE: 0
CONSTITUTIONAL NEGATIVE: 0

## 2025-04-25 ASSESSMENT — REFRACTION
OS_SPHERE: +0.75
OD_SPHERE: +0.50

## 2025-04-25 ASSESSMENT — TONOMETRY
OD_IOP_MMHG: 15
OS_IOP_MMHG: 16
IOP_METHOD: GOLDMANN APPLANATION

## 2025-04-25 ASSESSMENT — SLIT LAMP EXAM - LIDS
COMMENTS: NORMAL
COMMENTS: NORMAL

## 2025-04-25 ASSESSMENT — VISUAL ACUITY
OD_SC+: -2
OS_SC+: +2
OS_SC: 20/40
OD_SC: 20/20
METHOD: SNELLEN - LINEAR

## 2025-04-25 ASSESSMENT — CUP TO DISC RATIO
OS_RATIO: 0.2
OD_RATIO: 0.2

## 2025-04-25 ASSESSMENT — EXTERNAL EXAM - LEFT EYE: OS_EXAM: NORMAL

## 2025-04-25 ASSESSMENT — EXTERNAL EXAM - RIGHT EYE: OD_EXAM: NORMAL

## 2025-04-25 NOTE — Clinical Note
Mild diabetic retinopathy (DR) w/o macular edema. Improved from 2023 exam. Recommend monitor in 1 year w/ repeat exam or sooner with any changes in vision.

## 2025-04-25 NOTE — PROGRESS NOTES
Assessment/Plan   Diagnoses and all orders for this visit:  Hypermetropia of both eyes  Presbyopia  Pt consents to receiving glasses Rx today. Patient's/guardian's signature obtained to acknowledge and confirm that a paper copy of glasses Rx was given to patient in compliance with [facility] Eyeglass Rule. Electronic copy of Rx will also be available via [employer].   Spec Rx released. Optional to fill as patient not currently having any difficulty. Ok to use OTC readers as desired. Ocular health WNL for age OU. Monitor 1 year. Refraction billed today.    Nuclear sclerotic cataract of both eyes  Patient's cataracts are not visually significant. Will monitor for changes. No indication for surgery at this time.    Mild nonproliferative diabetic retinopathy of both eyes without macular edema associated with type 2 diabetes mellitus  -     OCT, Retina - OU - Both Eyes  The nature of mild nonproliferative diabetic retinopathy was discussed with the patient.  Emphasis was placed on tight glucose, blood pressure, and serum lipid control.  Avoidance of smoking was emphasized.  Maintenance of normal body weight was emphasized.  Istable to previous recorded fundus exam.

## 2025-04-29 ENCOUNTER — APPOINTMENT (OUTPATIENT)
Dept: CARDIOLOGY | Facility: CLINIC | Age: 70
End: 2025-04-29
Payer: MEDICARE

## 2025-04-29 VITALS
DIASTOLIC BLOOD PRESSURE: 70 MMHG | BODY MASS INDEX: 27.12 KG/M2 | WEIGHT: 147.4 LBS | HEART RATE: 82 BPM | RESPIRATION RATE: 18 BRPM | OXYGEN SATURATION: 98 % | HEIGHT: 62 IN | SYSTOLIC BLOOD PRESSURE: 122 MMHG

## 2025-04-29 DIAGNOSIS — R01.1 HEART MURMUR: ICD-10-CM

## 2025-04-29 DIAGNOSIS — R00.2 PALPITATIONS: ICD-10-CM

## 2025-04-29 DIAGNOSIS — G47.33 OSA (OBSTRUCTIVE SLEEP APNEA): ICD-10-CM

## 2025-04-29 DIAGNOSIS — E78.2 MIXED HYPERLIPIDEMIA: ICD-10-CM

## 2025-04-29 DIAGNOSIS — I10 ESSENTIAL HYPERTENSION: ICD-10-CM

## 2025-04-29 DIAGNOSIS — R06.09 DYSPNEA ON EXERTION: Primary | ICD-10-CM

## 2025-04-29 PROCEDURE — 1036F TOBACCO NON-USER: CPT | Performed by: NURSE PRACTITIONER

## 2025-04-29 PROCEDURE — 1123F ACP DISCUSS/DSCN MKR DOCD: CPT | Performed by: NURSE PRACTITIONER

## 2025-04-29 PROCEDURE — 1160F RVW MEDS BY RX/DR IN RCRD: CPT | Performed by: NURSE PRACTITIONER

## 2025-04-29 PROCEDURE — 3008F BODY MASS INDEX DOCD: CPT | Performed by: NURSE PRACTITIONER

## 2025-04-29 PROCEDURE — 1159F MED LIST DOCD IN RCRD: CPT | Performed by: NURSE PRACTITIONER

## 2025-04-29 PROCEDURE — 99204 OFFICE O/P NEW MOD 45 MIN: CPT | Performed by: NURSE PRACTITIONER

## 2025-04-29 PROCEDURE — 4010F ACE/ARB THERAPY RXD/TAKEN: CPT | Performed by: NURSE PRACTITIONER

## 2025-04-29 PROCEDURE — 3078F DIAST BP <80 MM HG: CPT | Performed by: NURSE PRACTITIONER

## 2025-04-29 PROCEDURE — 93000 ELECTROCARDIOGRAM COMPLETE: CPT | Performed by: NURSE PRACTITIONER

## 2025-04-29 PROCEDURE — 3074F SYST BP LT 130 MM HG: CPT | Performed by: NURSE PRACTITIONER

## 2025-04-29 NOTE — PATIENT INSTRUCTIONS
- Echocardiogram (ultrasound of your heart) to assess the function as well as for any valve abnormalities  - exercise stress test  - follow up after to review results    It was my pleasure to meet you. I look forward to being your cardiac Nurse Practitioner. I am a huge believer in communicating with my patients. Please contact me at any time, if anything is not clear to you regarding anything we have discussed, or if new questions occur to you.

## 2025-04-29 NOTE — PROGRESS NOTES
"Name : Pooja Burns   : 1955   MRN : [id number]   ENC Date : 2025    CC:   Palpitations, Shortness of Breath, Sleep Apnea, DM, Hypertension, and Hyperlipidemia     HPI:    Pooja Burns \"Catie\" is a 69 y.o. female with PMHx sig for HTN, HLD, DM, GERD who presents today as above.    \"Sometimes I feel my heart flutter.\" Does not occur often.    OLEA for the past 1-2 yrs. Notices SOB with walking fast, going upstairs or doing yard work. States, \"I think that it's getting worse\". No c/o chest pain. No associated N/V diaphoresis.    No routine exercise     Has been told in the past that she has a murmur    LDL 94  A1C 7.7    Retired from     CV Diagnostics:  Lexiscan 23: Normal, EF 65%    Echo 22: EF 60%, impaired relaxation, RVSP 24.1 mmHg    ROS: unless otherwise noted in the history of present illness, all other systems were reviewed and they are negative for complaints     PMH:  As above    PSH:  She has a past surgical history that includes Appendectomy (2013); Rhinoplasty (2013); Hysterectomy (2013); Other surgical history (2016); and Other surgical history (10/20/2015).    SHx:  She reports that she has never smoked. She has been exposed to tobacco smoke. She has never used smokeless tobacco. She reports that she does not drink alcohol and does not use drugs.    FHx:  No sig CVD in 1st degree relative    Allergies:  Atorvastatin, Lovastatin, and Dicyclomine    Outpatient Medications:  Current Outpatient Medications   Medication Instructions    aspirin 81 mg EC tablet 1 tablet, Daily    azelastine (Astelin) 137 mcg (0.1 %) nasal spray ADMINISTER 2 SPRAYS INTO EACH NOSTRIL IN THE MORNING AND 2 SPRAYS BEFORE BEDTIME.    Blood glucose monitoring meter Use as directed to check blood glucose    blood sugar diagnostic strip Use to check blood sugar once daily as directed    famotidine (Pepcid) 20 mg tablet TAKE 1 TABLET (20 MG) BY MOUTH 2 TIMES A DAY.    FreeStyle " "lancets 28 gauge Use as instructed once daily    hydroCHLOROthiazide (HYDRODIURIL) 25 mg, oral, Daily    lisinopril 5 mg, oral, Daily    metFORMIN XR (GLUCOPHAGE-XR) 1,000 mg, oral, 2 times daily, Take with meals    potassium chloride CR (Klor-Con) 10 mEq ER tablet 10 mEq, oral, Daily    rosuvastatin (CRESTOR) 5 mg, oral, Nightly     Last Recorded Vitals:  Vitals:    04/29/25 1559   BP: 122/70   BP Location: Left arm   Patient Position: Sitting   Pulse: 82   Resp: 18   SpO2: 98%   Weight: 66.9 kg (147 lb 6.4 oz)   Height: 1.575 m (5' 2\")     Physical Exam:  On exam Ms. Pooja Burns appears her stated age, is alert and oriented x3, and in no acute distress. Her sclera are anicteric and her oropharynx has moist mucous membranes. Her neck is supple and without thyromegaly. The JVP is ~5 cm of water above the right atrium. Her cardiac exam has regular rhythm, normal S1, S2. No S3/4. There are no murmurs. Her lungs are clear to auscultation bilaterally and there is no dullness to percussion. Her abdomen is soft, nontender with normoactive bowel sounds. There is no HJR. The extremities are warm and without edema. The skin is dry. There is no rash present. The distal pulses are 2-3+ in all four extremities. Her mood and affect are appropriate for todays encounter.      Last Labs:  CBC -  Lab Results   Component Value Date    WBC 7.7 04/12/2025    HGB 14.3 04/12/2025    HCT 42.7 04/12/2025    MCV 90.9 04/12/2025     04/12/2025       CMP -  Lab Results   Component Value Date    CALCIUM 9.7 04/12/2025    PROT 7.2 04/12/2025    ALBUMIN 4.8 04/12/2025    AST 23 04/12/2025    ALT 24 04/12/2025    ALKPHOS 84 04/12/2025    BILITOT 0.7 04/12/2025       LIPID PANEL -   Lab Results   Component Value Date    CHOL 167 04/12/2025    TRIG 87 04/12/2025    HDL 55 04/12/2025    CHHDL 3.0 04/12/2025    LDLF 120 (H) 07/08/2023    VLDL 21 07/27/2024    NHDL 112 04/12/2025       RENAL FUNCTION PANEL -   Lab Results   Component Value " Date    GLUCOSE 142 (H) 04/12/2025     04/12/2025    K 5.3 04/12/2025     04/12/2025    CO2 28 04/12/2025    ANIONGAP 10 04/12/2025    BUN 19 04/12/2025    CREATININE 1.19 (H) 04/12/2025    CALCIUM 9.7 04/12/2025    ALBUMIN 4.8 04/12/2025        Lab Results   Component Value Date    BNP 5 01/31/2023    HGBA1C 7.7 (H) 04/12/2025    HGBA1C 8.2 (A) 12/18/2024     I have personally reviewed the above lab results: CBC, chemistry, other labs as you see listed & diagnostics, I have specifically listed the results of these tests above.    Assessment/Plan:  OLEA  Murmur  Hypertension  Hyperlipidemia.  Palpitations  FIDELINA    She appears euvolemic. Hemodynamically optimized. Murmur sounds consistent with Aortic Sclerosis or mild stenosis, LDL nicely controlled. Diabetes is not well controlled with A1C 7.7.     Will get echocardiogram for further evaluation of structure & function. EKG is without ischemic changes, but many risk factors for CAD (age, HTN, HLD, DM, Obesity) & symptoms are concerning. Would initiate ischemic work up. As her murmur does not sound consistent with Severe Aortic stenosis, I think it is safe for her to undergo exercise stress test.    Palpitations are infrequent so will hold off on monitor for now.    Should follow up on treatment of her FIDELINA. Long discussion about negative impact on the heart if FIDELINA goes untreated.    Follow up after testing    Tracy M Schwab, APRN-CNP

## 2025-05-12 ENCOUNTER — APPOINTMENT (OUTPATIENT)
Dept: PRIMARY CARE | Facility: CLINIC | Age: 70
End: 2025-05-12
Payer: MEDICARE

## 2025-05-12 VITALS
DIASTOLIC BLOOD PRESSURE: 60 MMHG | HEART RATE: 88 BPM | OXYGEN SATURATION: 98 % | RESPIRATION RATE: 16 BRPM | BODY MASS INDEX: 27.09 KG/M2 | SYSTOLIC BLOOD PRESSURE: 110 MMHG | WEIGHT: 147.2 LBS | TEMPERATURE: 98 F | HEIGHT: 62 IN

## 2025-05-12 DIAGNOSIS — Q25.40 ABNORMALITY OF THORACIC AORTA (HHS-HCC): ICD-10-CM

## 2025-05-12 DIAGNOSIS — R06.09 DYSPNEA ON EXERTION: ICD-10-CM

## 2025-05-12 DIAGNOSIS — E78.2 MIXED HYPERLIPIDEMIA: ICD-10-CM

## 2025-05-12 DIAGNOSIS — I10 ESSENTIAL HYPERTENSION: ICD-10-CM

## 2025-05-12 DIAGNOSIS — E11.3293 TYPE 2 DIABETES MELLITUS WITH BOTH EYES AFFECTED BY MILD NONPROLIFERATIVE RETINOPATHY WITHOUT MACULAR EDEMA, WITHOUT LONG-TERM CURRENT USE OF INSULIN: Primary | ICD-10-CM

## 2025-05-12 PROCEDURE — 3074F SYST BP LT 130 MM HG: CPT | Performed by: INTERNAL MEDICINE

## 2025-05-12 PROCEDURE — 3008F BODY MASS INDEX DOCD: CPT | Performed by: INTERNAL MEDICINE

## 2025-05-12 PROCEDURE — G2211 COMPLEX E/M VISIT ADD ON: HCPCS | Performed by: INTERNAL MEDICINE

## 2025-05-12 PROCEDURE — 99214 OFFICE O/P EST MOD 30 MIN: CPT | Performed by: INTERNAL MEDICINE

## 2025-05-12 PROCEDURE — 1160F RVW MEDS BY RX/DR IN RCRD: CPT | Performed by: INTERNAL MEDICINE

## 2025-05-12 PROCEDURE — 1036F TOBACCO NON-USER: CPT | Performed by: INTERNAL MEDICINE

## 2025-05-12 PROCEDURE — 3078F DIAST BP <80 MM HG: CPT | Performed by: INTERNAL MEDICINE

## 2025-05-12 PROCEDURE — 1159F MED LIST DOCD IN RCRD: CPT | Performed by: INTERNAL MEDICINE

## 2025-05-12 PROCEDURE — 4010F ACE/ARB THERAPY RXD/TAKEN: CPT | Performed by: INTERNAL MEDICINE

## 2025-05-12 ASSESSMENT — ENCOUNTER SYMPTOMS
NAUSEA: 0
FLANK PAIN: 0
DIFFICULTY URINATING: 0
SINUS PAIN: 0
AGITATION: 0
BACK PAIN: 0
JOINT SWELLING: 0
GASTROINTESTINAL NEGATIVE: 1
DIARRHEA: 0
APPETITE CHANGE: 0
PALPITATIONS: 0
VOMITING: 0
CONFUSION: 0
NECK STIFFNESS: 0
ALLERGIC/IMMUNOLOGIC NEGATIVE: 1
FREQUENCY: 0
ENDOCRINE NEGATIVE: 1
BLOOD IN STOOL: 0
POLYPHAGIA: 0
SINUS PRESSURE: 0
LIGHT-HEADEDNESS: 0
MYALGIAS: 0
WEAKNESS: 0
SHORTNESS OF BREATH: 0
DYSURIA: 0
ADENOPATHY: 0
WOUND: 0
CONSTITUTIONAL NEGATIVE: 1
CONSTIPATION: 0
FACIAL ASYMMETRY: 0
MUSCULOSKELETAL NEGATIVE: 1
EYE DISCHARGE: 0
TREMORS: 0
SORE THROAT: 0
NUMBNESS: 0
TROUBLE SWALLOWING: 0
COLOR CHANGE: 0
CHEST TIGHTNESS: 0
NEUROLOGICAL NEGATIVE: 1
HALLUCINATIONS: 0
BRUISES/BLEEDS EASILY: 0
DIZZINESS: 0
EYES NEGATIVE: 1
NERVOUS/ANXIOUS: 0
NECK PAIN: 0
PSYCHIATRIC NEGATIVE: 1
ABDOMINAL PAIN: 0
CARDIOVASCULAR NEGATIVE: 1
STRIDOR: 0
COUGH: 0
WHEEZING: 0
UNEXPECTED WEIGHT CHANGE: 0
VOICE CHANGE: 0
SLEEP DISTURBANCE: 0
RESPIRATORY NEGATIVE: 1
SEIZURES: 0
ARTHRALGIAS: 0
EYE REDNESS: 0
HEADACHES: 0
EYE PAIN: 0
ACTIVITY CHANGE: 0
POLYDIPSIA: 0
HEMATOLOGIC/LYMPHATIC NEGATIVE: 1
SPEECH DIFFICULTY: 0

## 2025-05-12 NOTE — ASSESSMENT & PLAN NOTE
Recent stress test: Normal (2023).  Please gradually increase your activities/exercise.  F/up with cardiology.

## 2025-05-12 NOTE — PROGRESS NOTES
Subjective   Patient ID: Catie Burns is a 70 y.o. female who presents for Follow-up (Pt is here due to a follow up).    HPI   This is 70 yo patient with complex medical problems including HTN, HLD, DM, osteopenia.     We reviewed her medical conditions during today's visit.    Patient has been feeling pretty good and has been complaint with prescribed medications.    Abdominal pain resolved. Recent CT abdomen/pel ( 4/15/2025) did not show significant abnormalities.   We reviewed results.  She had colonoscopy done in March 2025 which showed diverticulosis, small polyp was removed.        We reviewed and discussed details of recent blood work: CBC, CMP, TSH, Lipid panel, Hb A1c, Vit D Results within acceptable range except low Vit d.  HbA1c: 7.7 (8.5). LDL: 94.      Patient has been following with [facility] pharmacist, I have been communicating with pharmacist regularly and supervising treatment.     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    She saw cardiology Tracy Schwab regarding SOB, advised echocardiogram, stress test and sleep study.  I reviewed recent cardiology visit note.    Patient was  concerned about getting SOB easily and having occasional palpations/fluttering. Was referred to cardiology.   Her ECG showed possible inferior infarct (noted in 2024 and on previous ECG's)  She underwent perfusion stress test in July 2023 which was normal.     She gets SOB with exertion, most likely due to poor exercise tolerance and sedentary lifestyle.     I advised to start exercise program and gradually increase level of exertion. Advised to diony heart rate to at least 120 bpm when exercising.   She retired from  on 11/01/2024.     Patient was diagnosed with fatty liver, saw GI, had Fiberscan done which showed steatosis and 0 fibrosis.     We discussed importance of weight loss.     Patient may be a candidate for GLP1-a, follows with  [facility] pharmacist, we will consider  initiation of Ozempic or  Mounjaro.     Patient has been following with [facility] pharmacist, I have been communicating with pharmacist regularly and supervising treatment.      She went to ER on 3/13/24 with c/o abdominal pain and rectal bleeding.   CT abdomen and pelvis was done, no acute findings.   Urine cultures were positive for UTI, started on Cephalexin.        Review of Systems   Constitutional: Negative.  Negative for activity change, appetite change and unexpected weight change.   HENT: Negative.  Negative for congestion, ear discharge, ear pain, hearing loss, mouth sores, nosebleeds, sinus pressure, sinus pain, sore throat, trouble swallowing and voice change.    Eyes: Negative.  Negative for pain, discharge, redness and visual disturbance.   Respiratory: Negative.  Negative for cough, chest tightness, shortness of breath, wheezing and stridor.    Cardiovascular: Negative.  Negative for chest pain, palpitations and leg swelling.   Gastrointestinal: Negative.  Negative for abdominal pain, blood in stool, constipation, diarrhea, nausea and vomiting.   Endocrine: Negative.  Negative for polydipsia, polyphagia and polyuria.   Genitourinary: Negative.  Negative for difficulty urinating, dysuria, flank pain, frequency and urgency.   Musculoskeletal: Negative.  Negative for arthralgias, back pain, gait problem, joint swelling, myalgias, neck pain and neck stiffness.   Skin: Negative.  Negative for color change, rash and wound.   Allergic/Immunologic: Negative.  Negative for environmental allergies, food allergies and immunocompromised state.   Neurological: Negative.  Negative for dizziness, tremors, seizures, syncope, facial asymmetry, speech difficulty, weakness, light-headedness, numbness and headaches.   Hematological: Negative.  Negative for adenopathy. Does not bruise/bleed easily.   Psychiatric/Behavioral: Negative.  Negative for agitation, behavioral problems, confusion, hallucinations, sleep disturbance and suicidal ideas. The  "patient is not nervous/anxious.    All other systems reviewed and are negative.      Objective   /60 (BP Location: Right arm, Patient Position: Sitting, BP Cuff Size: Adult)   Pulse 88   Temp 36.7 °C (98 °F) (Temporal)   Resp 16   Ht 1.575 m (5' 2\")   Wt 66.8 kg (147 lb 3.2 oz)   SpO2 98%   BMI 26.92 kg/m²     Physical Exam  Vitals and nursing note reviewed.   Constitutional:       General: She is not in acute distress.     Appearance: Normal appearance.   HENT:      Head: Normocephalic and atraumatic.      Right Ear: External ear normal.      Left Ear: External ear normal.      Nose: Nose normal. No congestion or rhinorrhea.   Eyes:      General:         Right eye: No discharge.         Left eye: No discharge.      Extraocular Movements: Extraocular movements intact.      Conjunctiva/sclera: Conjunctivae normal.      Pupils: Pupils are equal, round, and reactive to light.   Cardiovascular:      Rate and Rhythm: Normal rate and regular rhythm.      Pulses: Normal pulses.      Heart sounds: Normal heart sounds. No murmur heard.     No friction rub. No gallop.   Pulmonary:      Effort: Pulmonary effort is normal. No respiratory distress.      Breath sounds: Normal breath sounds. No stridor. No wheezing, rhonchi or rales.   Chest:      Chest wall: No tenderness.   Abdominal:      General: Bowel sounds are normal.      Palpations: Abdomen is soft. There is no mass.      Tenderness: There is no abdominal tenderness. There is no guarding or rebound.   Musculoskeletal:         General: No swelling or deformity. Normal range of motion.      Cervical back: Normal range of motion and neck supple. No rigidity or tenderness.      Right lower leg: No edema.      Left lower leg: No edema.   Lymphadenopathy:      Cervical: No cervical adenopathy.   Skin:     General: Skin is warm and dry.      Coloration: Skin is not jaundiced.      Findings: No bruising or erythema.   Neurological:      General: No focal deficit " present.      Mental Status: She is alert and oriented to person, place, and time. Mental status is at baseline.      Cranial Nerves: No cranial nerve deficit.      Motor: No weakness.      Coordination: Coordination normal.      Gait: Gait normal.   Psychiatric:         Mood and Affect: Mood normal.         Behavior: Behavior normal.         Thought Content: Thought content normal.         Judgment: Judgment normal.         Assessment/Plan   Problem List Items Addressed This Visit           ICD-10-CM       Cardiac and Vasculature    Essential hypertension I10    Controlled. Continue current medication.                Hyperlipidemia E78.5    Clinically stable. Continue statin.               Dyspnea on exertion R06.09    Recent stress test: Normal (2023).  Please gradually increase your activities/exercise.  F/up with cardiology.              Abnormality of thoracic aorta (HHS-HCC) Q25.40    Clinically stable, will monitor.             Endocrine/Metabolic    Type 2 diabetes mellitus - Primary E11.9    Clinically stable. Continue current treatment.          Relevant Orders    Comprehensive Metabolic Panel    Hemoglobin A1C    Albumin-Creatinine Ratio, Urine Random   It was a pleasure to see you today.  I would like to remind you about importance of a healthy lifestyle in order to improve your well-being and live longer.  Try to engage in physical activities for at least 150 minutes per week.  Eat about 10 servings of fruits and vegetables daily. My advice is 2 servings of fruits and 8 servings of vegetables.  For vegetables choose at least half of them green and at least half of them fresh.  Please avoid sugar, salt, fried food and saturated fat.    I spent a total of 35 minutes on the date of service for follow up visit, which included preparing to see the patient, face-to-face patient care, completing clinical documentation, obtaining and/or reviewing separately obtained history, performing a medically appropriate  examination, counseling and educating the patient/family/caregiver, ordering medications, tests, or procedures, communicating with other health care providers (not separately reported), independently interpreting results (not separately reported), communicating results to the patient/family/caregiver, and care coordination (not separately reported).      F/up in 3 months or sooner if needed.

## 2025-05-21 NOTE — PROGRESS NOTES
"  Clinical Pharmacy Appointment    Patient ID: Pooja Burns \"Moses" is a 70 y.o. female who presents for diabetes management.    Pt is here for Follow Up appointment.     Referring Provider: Felicia Valero M*  PCP: Felicia Valero MD PhD   Last visit with PCP: 05/12/25  Next visit with PCP: not scheduled yet - most likely in August      Subjective     Interval History  Patient referred for T2DM. Patient expressed hesitancy with starting new diabetes medication. Discussion of side effects and concerns were discussed at length, patient deferred to start new medications and agreed to increase metformin  mg to be titrated up to 1000 mg BID. Patient still has hesitancy with starting new diabetes medications, took some time to titrate patient to target metformin dose of 1000 mg BID.     At last clinical pharmacy encounter (04/24/25)- Although patient's A1c improved slightly, patient is still not at goal. Fasting blood glucose seemed to be generally controlled but was not able to assess post-prandial blood glucose. Instructed patient to record post-prandial blood glucose if able to help assess best treatment options to help get diabetes under control. Discussed GLP-1 therapy - patient was still hesitant. Also discussed oral Rybelsus as an option. Agreed to discuss at next follow up.     Patient recently treated for UTI - re-trial of Jardiance may not be the best option at this time. Patient would be good candidate for GLP-1 therapy, however has concerns for side effects. If patient absolutely refuses, may consider DPP4-inhibitor.     Last A1c: 7.7% (04/12/25)    HPI  DIABETES MELLITUS TYPE 2:    Diagnosed with diabetes:  ~10 years   Known diabetic complications: none.  Does patient follow with Endocrinology: No  Last optometry exam: over a year since last eye appointment - has eye exam scheduled 04/2025  Last foot exam: Podiatry visit  10/03/24 -- patient denies sores or cuts on feet today    "   Current diabetic medications include:  Metformin  mg - 2 tablets twice daily  Adherence: denies, uses a pill box   Adverse effects: denied      Past diabetic medications include:  Jardiance (discontinued after 1 week due to lightheadedness)  Glipizide (discontinued due to hypoglycemia at the time)  Trulicity (was prescribed but did not start due to not being comfortable with injections and the side effects)  Metformin at 4 tablets daily (states blood sugars were too low)    Glucose Readings:  Glucometer/CGM Type: FreeStyle lite     Current home BG readings: tested fasting blood sugars only  DATE  FBG PPBG  25 112 115  25 87 108  25 94 138  25 95 70  25 136 79  25 94 127  25 85 133  25 88 154  25 96 130  Average FB  Average PPB    Previous home BG readings:   25:  DATE  FBG Comments                                     25 101     04/10/25 125     25 106     25 99     25 96     25 103     04/15/25 102     25 93     25 124 Had stopped metformin  25 130     25 127     25 130 Most likely resumed metformin at this time    25 136                                      Average FB    25:  Average fastin  Average post prandial: 154    25:  Average Fasting Glucose: 114 mg/dL    01/10/25:  DATE FASTING BG   1/10 128    124    115      24: self reported - fasting typically 140-150s     Any episodes of hypoglycemia? Denies  Did patient treat episode of hypoglycemia appropriately? N/A  Does the patient have a prescription for ready-to-use Glucagon? N/a    Does pt have proteinuria? No - UACR 8.1 ug/mg crt 24      Lifestyle:   Diet: 3 meals/day - eating less sugary, eating more vegetables  No significant changes at this time  Been watching her diet more now retired  Breakfast: Eggs, toast, orange, coffee with cream, water   Lunch: [place] (grilled  cheese) or soup, fruit   Dinner: Usually a protein, salad/vegetable, carb   Snacks: pretzels, fruit, celery w/ cream cheese, carrots with dip, nuts  Drinks: Juice occasionally, drinks more water     Physical Activity: doing more activity  Recovering from being ill, has not been able to do much  Walking more now she is retired - 40 -60 min a day, moving around more     Primary/Secondary Prevention:  Statin? Yes - rosuvastatin 5 mg   ACE-I/ARB? Yes - lisinopril 5 mg   Aspirin? Yes     Pertinent PMH Review:  PMH of Pancreatitis: No  PMH of Retinopathy: No  PMH of Urinary Tract Infections: Yes - most recent 07/2024  PMH of MTC/MEN2: No  PMH of gastroparesis/severe GI diseases: No    Drug Interactions  No relevant drug interactions were noted.     Medication System Management  Patient's preferred pharmacy:   [facility]  [address], [address]  [place] [facility] [address]  Phone: [phone] Fax: [phone]     [facility] - [place] [facility] [address] [facility] [address] [facility] [address]  [place] [address]  Phone: [phone] Fax: [phone]      Objective   Allergies   Allergen Reactions    Atorvastatin Myalgia    Lovastatin Myalgia    Dicyclomine Rash     Social History     Social History Narrative    Not on file      Medication Review  Current Outpatient Medications   Medication Instructions    aspirin 81 mg EC tablet 1 tablet, Daily    azelastine (Astelin) 137 mcg (0.1 %) nasal spray ADMINISTER 2 SPRAYS INTO EACH NOSTRIL IN THE MORNING AND 2 SPRAYS BEFORE BEDTIME.    Blood glucose monitoring meter Use as directed to check blood glucose    blood sugar diagnostic (Blood Glucose Test) Test blood sugar 1-2 times a day - fasting or 2 hours after meal    famotidine (Pepcid) 20 mg tablet TAKE 1 TABLET (20 MG) BY MOUTH 2 TIMES A DAY.    FreeStyle lancets 28 gauge Use as instructed once daily    hydroCHLOROthiazide (HYDRODIURIL) 25 mg, oral, Daily     lisinopril 5 mg, oral, Daily    metFORMIN XR (GLUCOPHAGE-XR) 1,000 mg, oral, 2 times daily, Take with meals    potassium chloride CR (Klor-Con) 10 mEq ER tablet 10 mEq, oral, Daily    rosuvastatin (CRESTOR) 5 mg, oral, Nightly      Vitals  BP Readings from Last 2 Encounters:   05/12/25 110/60   04/29/25 122/70     BMI Readings from Last 1 Encounters:   05/12/25 26.92 kg/m²      Labs  A1C  Lab Results   Component Value Date    HGBA1C 7.7 (H) 04/12/2025    HGBA1C 8.2 (A) 12/18/2024    HGBA1C 8.5 (H) 10/15/2024     BMP  Lab Results   Component Value Date    CALCIUM 9.7 04/12/2025     04/12/2025    K 5.3 04/12/2025    CO2 28 04/12/2025     04/12/2025    BUN 19 04/12/2025    CREATININE 1.19 (H) 04/12/2025    EGFR 50 (L) 04/12/2025     LFTs  Lab Results   Component Value Date    ALT 24 04/12/2025    AST 23 04/12/2025    ALKPHOS 84 04/12/2025    BILITOT 0.7 04/12/2025     FLP  Lab Results   Component Value Date    TRIG 87 04/12/2025    CHOL 167 04/12/2025    LDLF 120 (H) 07/08/2023    LDLCALC 94 04/12/2025    HDL 55 04/12/2025     Urine Microalbumin  Lab Results   Component Value Date    MICROALBCREA 13 04/12/2025     Weight Management  Wt Readings from Last 3 Encounters:   05/12/25 66.8 kg (147 lb 3.2 oz)   04/29/25 66.9 kg (147 lb 6.4 oz)   04/15/25 67.8 kg (149 lb 6.4 oz)        Assessment/Plan   Problem List Items Addressed This Visit       Diabetes mellitus type 2 without retinopathy (Multi)    Relevant Medications    blood sugar diagnostic (Blood Glucose Test)    Other Relevant Orders    Referral to Clinical Pharmacy    Type 2 diabetes mellitus    Patient's goal A1c is < 7%.  Patient is above goal with most recent A1c of 7.7% on 04/12/25, which was a decrease from 8.2% on 12/18/24.   Patient's SMBGs are generally controlled - average fasting blood glucose is 99 mg/dL and average post-prandial blood glucose is 117 mg/dL, with majority of the readings being within goal.  Patient confirmed taking diabetes  medication as directed and denied any significant issues with adherence or adverse side effects.   Denied any s/sx of hypoglycemia or hyperglycemia.       Rationale for plan:   Due to patient's SMBGs being generally within goal, will defer from initiating new medications at this time - patient is still very hesitant with starting new medications, therefore will defer and re-discuss after patient's PCP follow up around 08/2025.   Discussed with patient having A1c of >7% in individuals with Type 2 diabetes are at increased risk of adverse cardiovascular events and kidney disease.   Patient is willing to revisit discussion about GLP-1 therapy or DPP4-inhibitor if there has been no significant decrease in her A1c at next follow up.    Instructed patient to continue to monitor blood glucose levels at home and sent adjusted script to be able to test 1-2 times a day either fasting or 2 hours after heaviest meal - patient agreed.   Will follow up in about 3 months to reassess glycemic control.     Medication Changes:  CONTINUE  Metformin  mg - 2 tablets twice daily         Relevant Medications    blood sugar diagnostic (Blood Glucose Test)     Clinical Pharmacist follow-up: 08/22/25, Telehealth visit  Continue all meds under the continuation of care with the referring provider and clinical pharmacy team.    Thank you,  Cyndi Lenz (Suji), PharmD  PGY-1  Meds Pharmacy Resident     Verbal consent to manage patient's drug therapy was obtained from the patient. They were informed they may decline to participate or withdraw from participation in pharmacy services at any time.

## 2025-05-23 ENCOUNTER — APPOINTMENT (OUTPATIENT)
Dept: PHARMACY | Facility: HOSPITAL | Age: 70
End: 2025-05-23
Payer: MEDICARE

## 2025-05-23 DIAGNOSIS — E11.9 TYPE 2 DIABETES MELLITUS WITHOUT COMPLICATION, WITHOUT LONG-TERM CURRENT USE OF INSULIN: ICD-10-CM

## 2025-05-23 DIAGNOSIS — E11.9 DIABETES MELLITUS TYPE 2 WITHOUT RETINOPATHY (MULTI): ICD-10-CM

## 2025-05-23 DIAGNOSIS — I10 ESSENTIAL HYPERTENSION: ICD-10-CM

## 2025-05-23 PROCEDURE — RXMED WILLOW AMBULATORY MEDICATION CHARGE

## 2025-05-23 RX ORDER — IBUPROFEN 200 MG
CAPSULE ORAL
Qty: 200 EACH | Refills: 3 | Status: SHIPPED | OUTPATIENT
Start: 2025-05-23

## 2025-05-23 RX ORDER — HYDROCHLOROTHIAZIDE 25 MG/1
25 TABLET ORAL DAILY
Qty: 90 TABLET | Refills: 3 | Status: SHIPPED | OUTPATIENT
Start: 2025-05-23

## 2025-05-23 RX ORDER — POTASSIUM CHLORIDE 750 MG/1
10 TABLET, FILM COATED, EXTENDED RELEASE ORAL DAILY
Qty: 90 TABLET | Refills: 3 | Status: SHIPPED | OUTPATIENT
Start: 2025-05-23 | End: 2026-05-23

## 2025-05-23 NOTE — ASSESSMENT & PLAN NOTE
Patient's goal A1c is < 7%.  Patient is above goal with most recent A1c of 7.7% on 04/12/25, which was a decrease from 8.2% on 12/18/24.   Patient's SMBGs are generally controlled - average fasting blood glucose is 99 mg/dL and average post-prandial blood glucose is 117 mg/dL, with majority of the readings being within goal.  Patient confirmed taking diabetes medication as directed and denied any significant issues with adherence or adverse side effects.   Denied any s/sx of hypoglycemia or hyperglycemia.       Rationale for plan:   Due to patient's SMBGs being generally within goal, will defer from initiating new medications at this time - patient is still very hesitant with starting new medications, therefore will defer and re-discuss after patient's PCP follow up around 08/2025.   Discussed with patient having A1c of >7% in individuals with Type 2 diabetes are at increased risk of adverse cardiovascular events and kidney disease.   Patient is willing to revisit discussion about GLP-1 therapy or DPP4-inhibitor if there has been no significant decrease in her A1c at next follow up.    Instructed patient to continue to monitor blood glucose levels at home and sent adjusted script to be able to test 1-2 times a day either fasting or 2 hours after heaviest meal - patient agreed.   Will follow up in about 3 months to reassess glycemic control.     Medication Changes:  CONTINUE  Metformin  mg - 2 tablets twice daily

## 2025-05-27 ENCOUNTER — PHARMACY VISIT (OUTPATIENT)
Dept: PHARMACY | Facility: CLINIC | Age: 70
End: 2025-05-27
Payer: COMMERCIAL

## 2025-05-28 ENCOUNTER — TELEPHONE (OUTPATIENT)
Dept: PHARMACY | Facility: HOSPITAL | Age: 70
End: 2025-05-28
Payer: MEDICARE

## 2025-05-28 ENCOUNTER — PHARMACY VISIT (OUTPATIENT)
Dept: PHARMACY | Facility: CLINIC | Age: 70
End: 2025-05-28

## 2025-05-28 NOTE — TELEPHONE ENCOUNTER
Patient called clinical pharmacy team to report a low blood glucose reading. Patient reported waking up at 3 am on Saturday (05/24) feeling shaky and jittery. She tested her blood glucose and had a reading of 64 mg/dL.     Patient denied any new changes to p.o. intake. Did report she has been eating healthier and exercising more. Have noted that her morning fasting blood glucose levels have been lower over the last few days ranging in the 90-80s.    Plan:  Counseled patient to reduce evening metformin dose to 1 tablet instead of 2 tablets - will have patient continue to check her morning blood glucose to monitor her fasting blood glucose isn't significantly elevated or continues to trend downwards.   Will follow up in 2 weeks to assess blood glucose readings and if additional medication adjustments are warranted.     Cyndi Lenz (Suji), PharmD  PGY-1  Meds Pharmacy Resident

## 2025-06-09 ENCOUNTER — HOSPITAL ENCOUNTER (OUTPATIENT)
Dept: CARDIOLOGY | Facility: HOSPITAL | Age: 70
Discharge: HOME | End: 2025-06-09
Payer: MEDICARE

## 2025-06-09 ENCOUNTER — HOSPITAL ENCOUNTER (OUTPATIENT)
Dept: RADIOLOGY | Facility: HOSPITAL | Age: 70
Discharge: HOME | End: 2025-06-09
Payer: MEDICARE

## 2025-06-09 VITALS — HEIGHT: 62 IN | BODY MASS INDEX: 27.09 KG/M2 | WEIGHT: 147.2 LBS

## 2025-06-09 DIAGNOSIS — Z12.31 ENCOUNTER FOR SCREENING MAMMOGRAM FOR MALIGNANT NEOPLASM OF BREAST: ICD-10-CM

## 2025-06-09 DIAGNOSIS — R06.02 SHORTNESS OF BREATH: ICD-10-CM

## 2025-06-09 DIAGNOSIS — R06.09 DYSPNEA ON EXERTION: ICD-10-CM

## 2025-06-09 DIAGNOSIS — Z78.0 POSTMENOPAUSAL: ICD-10-CM

## 2025-06-09 DIAGNOSIS — R01.1 HEART MURMUR: ICD-10-CM

## 2025-06-09 LAB
AORTIC VALVE MEAN GRADIENT: 7 MMHG
AORTIC VALVE PEAK VELOCITY: 1.72 M/S
AV PEAK GRADIENT: 12 MMHG
AVA (PEAK VEL): 1.36 CM2
AVA (VTI): 1.47 CM2
EJECTION FRACTION APICAL 4 CHAMBER: 60.6
EJECTION FRACTION: 58 %
LEFT ATRIUM VOLUME AREA LENGTH INDEX BSA: 20.3 ML/M2
LEFT VENTRICLE INTERNAL DIMENSION DIASTOLE: 4.43 CM (ref 3.5–6)
LEFT VENTRICULAR OUTFLOW TRACT DIAMETER: 1.78 CM
LV EJECTION FRACTION BIPLANE: 54 %
MITRAL VALVE E/A RATIO: 0.54
RIGHT VENTRICLE FREE WALL PEAK S': 13.57 CM/S
TRICUSPID ANNULAR PLANE SYSTOLIC EXCURSION: 1.7 CM

## 2025-06-09 PROCEDURE — 77067 SCR MAMMO BI INCL CAD: CPT

## 2025-06-09 PROCEDURE — 77063 BREAST TOMOSYNTHESIS BI: CPT | Mod: BILATERAL PROCEDURE | Performed by: STUDENT IN AN ORGANIZED HEALTH CARE EDUCATION/TRAINING PROGRAM

## 2025-06-09 PROCEDURE — 93306 TTE W/DOPPLER COMPLETE: CPT

## 2025-06-09 PROCEDURE — 77080 DXA BONE DENSITY AXIAL: CPT

## 2025-06-09 PROCEDURE — 93306 TTE W/DOPPLER COMPLETE: CPT | Performed by: INTERNAL MEDICINE

## 2025-06-09 PROCEDURE — 77080 DXA BONE DENSITY AXIAL: CPT | Performed by: STUDENT IN AN ORGANIZED HEALTH CARE EDUCATION/TRAINING PROGRAM

## 2025-06-09 PROCEDURE — 77067 SCR MAMMO BI INCL CAD: CPT | Mod: BILATERAL PROCEDURE | Performed by: STUDENT IN AN ORGANIZED HEALTH CARE EDUCATION/TRAINING PROGRAM

## 2025-06-10 NOTE — PROGRESS NOTES
"  Clinical Pharmacy Appointment    Patient ID: Pooja Burns \"Catie\" is a 70 y.o. female who presents for diabetes management.    Pt is here for Follow Up appointment.     Referring Provider: Felicia Valero M*  PCP: Felicia Valero MD PhD   Last visit with PCP: 05/12/25  Next visit with PCP: 08/25/25      Subjective     Interval History  Patient referred for T2DM. Patient expressed hesitancy with starting new diabetes medication. Discussion of side effects and concerns were discussed at length, patient deferred to start new medications and agreed to increase metformin  mg to be titrated up to 1000 mg BID. Patient still has hesitancy with starting new diabetes medications, took some time to titrate patient to target metformin dose of 1000 mg BID.     Discussed GLP-1 therapy - patient was still hesitant. Also discussed oral Rybelsus as an option. Patient recently treated for UTI - re-trial of Jardiance may not be the best option at this time. Patient would be good candidate for GLP-1 therapy, however has concerns for side effects. If patient absolutely refuses, may consider DPP4-inhibitor.    At last clinical pharmacy encounter (05/23/25) - SMBGs have been generally within goal, therefore deferred from initiating new medications. Planned to follow up after next PCP visit and updated A1c to assess if patient still requires follow up by clinical pharmacy.     As of note - patient reached out to clinical pharmacy due to having a low BG reading of <70 mg/dL during the night. Adjusted metformin to 2 tabs in the AM and 1 tab in the evening. Following up today to assess glycemic control and that patient is not having low BG readings.     Last A1c: 7.7% (04/12/25)    HPI  DIABETES MELLITUS TYPE 2:    Diagnosed with diabetes:  ~10 years   Known diabetic complications: none.  Does patient follow with Endocrinology: No  Last optometry exam: over a year since last eye appointment - has eye exam scheduled " 2025  Last foot exam: Podiatry visit  10/03/24 -- patient denies sores or cuts on feet today      Current diabetic medications include:  Metformin  mg -  taking 2 tab in AM and 1 tab in evening  Adherence: denies, uses a pill box   Adverse effects: denied      Past diabetic medications include:  Jardiance (discontinued after 1 week due to lightheadedness)  Glipizide (discontinued due to hypoglycemia at the time)  Trulicity (was prescribed but did not start due to not being comfortable with injections and the side effects)  Metformin at 4 tablets daily (states blood sugars were too low)    Glucose Readings:  Glucometer/CGM Type: FreeStyle lite   Current home BG readings:   DATE  FBG / AC PPBG  25 116  124  25 90  129  25 96  119  25 114  88  25 131  93  25 115  127  25 108  130  25 104  120  Average fastin mg/dL  Average post-prandial: 116 mg/dL    Previous home BG readings:   25:  DATE  FBG PPBG  25 112 115  25 87 108  25 94 138  25 95 70  25 136 79  25 94 127  25 85 133  25 88 154  25 96 130  Average FB  Average PPB    25:  DATE  FBG Comments                                     25 101     04/10/25 125     25 106     25 99     25 96     25 103     04/15/25 102     25 93     25 124 Had stopped metformin  25 130     25 127     25 130 Most likely resumed metformin at this time    25 136                                      Average FB    25:  Average fastin  Average post prandial: 154    25:  Average Fasting Glucose: 114 mg/dL    01/10/25:  DATE FASTING BG   1/10 128    124    115      24: self reported - fasting typically 140-150s     Any episodes of hypoglycemia? Denies  Did patient treat episode of hypoglycemia appropriately? N/A  Does the patient have a prescription for  ready-to-use Glucagon? N/a    Does pt have proteinuria? No - UACR 8.1 ug/mg crt 07/27/24      Lifestyle:   Diet: 3 meals/day - eating less sugary, eating more vegetables  No significant changes at this time  Been watching her diet more now retired  Breakfast: Eggs, toast, orange, coffee with cream, water   Lunch: [place] (grilled cheese) or soup, fruit   Dinner: Usually a protein, salad/vegetable, carb   Snacks: pretzels, fruit, celery w/ cream cheese, carrots with dip, nuts  Drinks: Juice occasionally, drinks more water     Physical Activity: doing more activity  Recovering from being ill, has not been able to do much  Walking more now she is retired - 40 -60 min a day, moving around more     Primary/Secondary Prevention:  Statin? Yes - rosuvastatin 5 mg   ACE-I/ARB? Yes - lisinopril 5 mg   Aspirin? Yes     Pertinent PMH Review:  PMH of Pancreatitis: No  PMH of Retinopathy: No  PMH of Urinary Tract Infections: Yes - most recent 07/2024  PMH of MTC/MEN2: No  PMH of gastroparesis/severe GI diseases: No    Drug Interactions  No relevant drug interactions were noted.     Medication System Management  Patient's preferred pharmacy:   [facility]  [address] [place] [address], [address]  [place] [address]  Phone: [phone] Fax: [phone]     [facility] [place] [facility]  [place] [address]  Phone: [phone] Fax: [phone]      Objective   Allergies   Allergen Reactions    Atorvastatin Myalgia    Lovastatin Myalgia    Dicyclomine Rash     Social History     Social History Narrative    Not on file      Medication Review  Current Outpatient Medications   Medication Instructions    aspirin 81 mg EC tablet 1 tablet, Daily    azelastine (Astelin) 137 mcg (0.1 %) nasal spray ADMINISTER 2 SPRAYS INTO EACH NOSTRIL IN THE MORNING AND 2 SPRAYS BEFORE BEDTIME.    Blood glucose monitoring meter Use as  directed to check blood glucose    blood sugar diagnostic (Blood Glucose Test) Test blood sugar 1-2 times a day - fasting or 2 hours after meal    famotidine (Pepcid) 20 mg tablet TAKE 1 TABLET (20 MG) BY MOUTH 2 TIMES A DAY.    FreeStyle lancets 28 gauge Use as instructed once daily    hydroCHLOROthiazide (HYDRODIURIL) 25 mg, oral, Daily    lisinopril 5 mg, oral, Daily    metFORMIN XR (GLUCOPHAGE-XR) 1,000 mg, oral, 2 times daily, Take with meals    potassium chloride CR (Klor-Con) 10 mEq ER tablet 10 mEq, oral, Daily    rosuvastatin (CRESTOR) 5 mg, oral, Nightly      Vitals  BP Readings from Last 2 Encounters:   05/12/25 110/60   04/29/25 122/70     BMI Readings from Last 1 Encounters:   06/09/25 26.92 kg/m²      Labs  A1C  Lab Results   Component Value Date    HGBA1C 7.7 (H) 04/12/2025    HGBA1C 8.2 (A) 12/18/2024    HGBA1C 8.5 (H) 10/15/2024     BMP  Lab Results   Component Value Date    CALCIUM 9.7 04/12/2025     04/12/2025    K 5.3 04/12/2025    CO2 28 04/12/2025     04/12/2025    BUN 19 04/12/2025    CREATININE 1.19 (H) 04/12/2025    EGFR 50 (L) 04/12/2025     LFTs  Lab Results   Component Value Date    ALT 24 04/12/2025    AST 23 04/12/2025    ALKPHOS 84 04/12/2025    BILITOT 0.7 04/12/2025     FLP  Lab Results   Component Value Date    TRIG 87 04/12/2025    CHOL 167 04/12/2025    LDLF 120 (H) 07/08/2023    LDLCALC 94 04/12/2025    HDL 55 04/12/2025     Urine Microalbumin  Lab Results   Component Value Date    MICROALBCREA 13 04/12/2025     Weight Management  Wt Readings from Last 3 Encounters:   06/09/25 66.8 kg (147 lb 3.2 oz)   05/12/25 66.8 kg (147 lb 3.2 oz)   04/29/25 66.9 kg (147 lb 6.4 oz)        Assessment/Plan   Problem List Items Addressed This Visit       Diabetes mellitus type 2 without retinopathy (Multi)    Type 2 diabetes mellitus    Patient's goal A1c is < 7%.  Patient is above goal with most recent A1c of 7.7% on 04/12/25, which was a decrease from 8.2% on 12/18/24.   Patient's  SMBGs are generally controlled - average fasting blood glucose is 109 mg/dL and average post-prandial blood glucose is 116 mg/dL, with majority of the readings being within goal.  Patient had called prior to next follow reporting a low blood sugar reading in the middle of the night of <70 mg/dL. She able to correct by ingesting fasting acting carbohydrate.  Adjusted metformin  mg to 2 tab in the morning and 1 tab in the evening to help with potential night time lows.   Denied any current issues with adverse side effects or adherence issues.   Denied any s/sx of hypoglycemia or hyperglycemia.    Rationale for plan:  Due to patient's SMBGs being generally within goal will have patient continue with metformin  mg - 2 tab in the morning and 1 tab in the evening.   Instructed patient to continue to monitor blood glucose levels at home and to contact PCP or clinical pharmacy team if her blood glucose levels are consistently >130 mg/dL for fasting or >180 mg/dL 2 hours after a meal.   Reviewed s/sx of hypoglycemia and hyperglycemia.   Will follow up in 2 months to reassess glycemic control and review update labs to determine if further medication changes are warranted.   Has follow up with PCP 08/2025 and most likely will have updated labs completed.     Medication Changes:  CONTINUE  Metformin  mg - 2 tab in the morning and 1 tab in the evening with meals    Future Considerations:  For additional glycemic control - consider GLP-1, however patient has expressed hesitancy in the past for injectable medications, can also consider DPP4-inhibitor.   Reported feeling lightheaded with Jardiance in the past, and with UTI within the last few months,  may not be the best option.           Clinical Pharmacist follow-up: 08/22/25, Telehealth visit  Continue all meds under the continuation of care with the referring provider and clinical pharmacy team.    Thank you,  Cyndi Lenz (Suji), PharmD  PGY-1  Meds Pharmacy  Resident     Verbal consent to manage patient's drug therapy was obtained from the patient. They were informed they may decline to participate or withdraw from participation in pharmacy services at any time.

## 2025-06-11 ENCOUNTER — TELEPHONE (OUTPATIENT)
Dept: ORTHOPEDIC SURGERY | Facility: CLINIC | Age: 70
End: 2025-06-11
Payer: MEDICARE

## 2025-06-11 NOTE — TELEPHONE ENCOUNTER
Called patient regarding appointment on June 26th that is going to need to be rescheduled. The phone number was invalid and it is the only one that is on file for the patient. Will try to contact patients daughter later today.

## 2025-06-12 ENCOUNTER — APPOINTMENT (OUTPATIENT)
Dept: PHARMACY | Facility: HOSPITAL | Age: 70
End: 2025-06-12
Payer: MEDICARE

## 2025-06-12 DIAGNOSIS — E11.9 DIABETES MELLITUS TYPE 2 WITHOUT RETINOPATHY (MULTI): ICD-10-CM

## 2025-06-12 DIAGNOSIS — E11.9 TYPE 2 DIABETES MELLITUS WITHOUT COMPLICATION, WITHOUT LONG-TERM CURRENT USE OF INSULIN: ICD-10-CM

## 2025-06-12 NOTE — ASSESSMENT & PLAN NOTE
Patient's goal A1c is < 7%.  Patient is above goal with most recent A1c of 7.7% on 04/12/25, which was a decrease from 8.2% on 12/18/24.   Patient's SMBGs are generally controlled - average fasting blood glucose is 109 mg/dL and average post-prandial blood glucose is 116 mg/dL, with majority of the readings being within goal.  Patient had called prior to next follow reporting a low blood sugar reading in the middle of the night of <70 mg/dL. She able to correct by ingesting fasting acting carbohydrate.  Adjusted metformin  mg to 2 tab in the morning and 1 tab in the evening to help with potential night time lows.   Denied any current issues with adverse side effects or adherence issues.   Denied any s/sx of hypoglycemia or hyperglycemia.    Rationale for plan:  Due to patient's SMBGs being generally within goal will have patient continue with metformin  mg - 2 tab in the morning and 1 tab in the evening.   Instructed patient to continue to monitor blood glucose levels at home and to contact PCP or clinical pharmacy team if her blood glucose levels are consistently >130 mg/dL for fasting or >180 mg/dL 2 hours after a meal.   Reviewed s/sx of hypoglycemia and hyperglycemia.   Will follow up in 2 months to reassess glycemic control and review update labs to determine if further medication changes are warranted.   Has follow up with PCP 08/2025 and most likely will have updated labs completed.     Medication Changes:  CONTINUE  Metformin  mg - 2 tab in the morning and 1 tab in the evening with meals    Future Considerations:  For additional glycemic control - consider GLP-1, however patient has expressed hesitancy in the past for injectable medications, can also consider DPP4-inhibitor.   Reported feeling lightheaded with Jardiance in the past, and with UTI within the last few months,  may not be the best option.

## 2025-06-13 NOTE — PROGRESS NOTES
I reviewed the progress note and agree with the resident’s findings and plans as written. Case discussed with resident.    Yuan Gallego, KasieD

## 2025-06-15 NOTE — RESULT ENCOUNTER NOTE
Your bone density scan showed osteopenia. Please maintain enough calcium in your diet:  2344-2178 mg daily (consume foods rich in calcium rather than taking only calcium supplement to meet daily calcium requirements), take daily Vitamin D supplement with meal. Average Vit D dose is 2000 international units daily.  Weight bearing exercise routine is recommended. We will discuss details during your next office visit.  Dr. Thakkar

## 2025-06-16 ENCOUNTER — PHARMACY VISIT (OUTPATIENT)
Dept: PHARMACY | Facility: CLINIC | Age: 70
End: 2025-06-16
Payer: COMMERCIAL

## 2025-06-16 DIAGNOSIS — J30.2 SEASONAL ALLERGIES: ICD-10-CM

## 2025-06-16 DIAGNOSIS — I10 ESSENTIAL (PRIMARY) HYPERTENSION: ICD-10-CM

## 2025-06-16 PROCEDURE — RXMED WILLOW AMBULATORY MEDICATION CHARGE

## 2025-06-16 RX ORDER — LISINOPRIL 5 MG/1
5 TABLET ORAL DAILY
Qty: 90 TABLET | Refills: 3 | Status: SHIPPED | OUTPATIENT
Start: 2025-06-16 | End: 2026-06-16

## 2025-06-16 RX ORDER — AZELASTINE 1 MG/ML
2 SPRAY, METERED NASAL NIGHTLY
Qty: 30 ML | Refills: 3 | Status: SHIPPED | OUTPATIENT
Start: 2025-06-16 | End: 2026-06-16

## 2025-06-18 ENCOUNTER — HOSPITAL ENCOUNTER (OUTPATIENT)
Dept: CARDIOLOGY | Facility: CLINIC | Age: 70
Discharge: HOME | End: 2025-06-18
Payer: MEDICARE

## 2025-06-18 DIAGNOSIS — R06.09 DYSPNEA ON EXERTION: ICD-10-CM

## 2025-06-18 PROCEDURE — 93018 CV STRESS TEST I&R ONLY: CPT | Performed by: INTERNAL MEDICINE

## 2025-06-18 PROCEDURE — 93016 CV STRESS TEST SUPVJ ONLY: CPT | Performed by: INTERNAL MEDICINE

## 2025-06-18 PROCEDURE — 93017 CV STRESS TEST TRACING ONLY: CPT

## 2025-06-26 ENCOUNTER — APPOINTMENT (OUTPATIENT)
Dept: ORTHOPEDIC SURGERY | Facility: CLINIC | Age: 70
End: 2025-06-26
Payer: MEDICARE

## 2025-07-03 DIAGNOSIS — R10.13 ABDOMINAL PAIN, EPIGASTRIC: ICD-10-CM

## 2025-07-03 DIAGNOSIS — E78.2 MIXED HYPERLIPIDEMIA: ICD-10-CM

## 2025-07-03 PROCEDURE — RXMED WILLOW AMBULATORY MEDICATION CHARGE

## 2025-07-03 RX ORDER — ROSUVASTATIN CALCIUM 5 MG/1
5 TABLET, COATED ORAL NIGHTLY
Qty: 90 TABLET | Refills: 3 | Status: SHIPPED | OUTPATIENT
Start: 2025-07-03 | End: 2026-07-03

## 2025-07-03 RX ORDER — FAMOTIDINE 20 MG/1
20 TABLET, FILM COATED ORAL 2 TIMES DAILY
Qty: 180 TABLET | Refills: 3 | Status: SHIPPED | OUTPATIENT
Start: 2025-07-03 | End: 2026-07-03

## 2025-07-08 ENCOUNTER — PHARMACY VISIT (OUTPATIENT)
Dept: PHARMACY | Facility: CLINIC | Age: 70
End: 2025-07-08
Payer: COMMERCIAL

## 2025-07-16 DIAGNOSIS — E11.9 DIABETES MELLITUS TYPE 2 WITHOUT RETINOPATHY (MULTI): ICD-10-CM

## 2025-07-16 PROCEDURE — RXMED WILLOW AMBULATORY MEDICATION CHARGE

## 2025-07-16 RX ORDER — METFORMIN HYDROCHLORIDE 500 MG/1
1000 TABLET, EXTENDED RELEASE ORAL 2 TIMES DAILY
Qty: 360 TABLET | Refills: 1 | Status: SHIPPED | OUTPATIENT
Start: 2025-07-16 | End: 2026-01-12

## 2025-07-18 ENCOUNTER — PHARMACY VISIT (OUTPATIENT)
Dept: PHARMACY | Facility: CLINIC | Age: 70
End: 2025-07-18
Payer: COMMERCIAL

## 2025-07-31 ENCOUNTER — APPOINTMENT (OUTPATIENT)
Facility: CLINIC | Age: 70
End: 2025-07-31
Payer: MEDICARE

## 2025-08-22 ENCOUNTER — APPOINTMENT (OUTPATIENT)
Dept: PHARMACY | Facility: HOSPITAL | Age: 70
End: 2025-08-22
Payer: MEDICARE

## 2025-08-22 DIAGNOSIS — E11.9 DIABETES MELLITUS TYPE 2 WITHOUT RETINOPATHY (MULTI): Primary | ICD-10-CM

## 2025-08-22 RX ORDER — METFORMIN HYDROCHLORIDE 500 MG/1
TABLET, EXTENDED RELEASE ORAL
Qty: 270 TABLET | Refills: 3 | Status: SHIPPED | OUTPATIENT
Start: 2025-08-22

## 2025-08-25 ENCOUNTER — APPOINTMENT (OUTPATIENT)
Dept: PRIMARY CARE | Facility: CLINIC | Age: 70
End: 2025-08-25
Payer: MEDICARE

## 2025-08-25 PROCEDURE — RXMED WILLOW AMBULATORY MEDICATION CHARGE

## 2025-08-28 ENCOUNTER — PHARMACY VISIT (OUTPATIENT)
Dept: PHARMACY | Facility: CLINIC | Age: 70
End: 2025-08-28
Payer: COMMERCIAL

## 2025-09-06 LAB
ALBUMIN SERPL-MCNC: 4.5 G/DL (ref 3.6–5.1)
ALBUMIN/CREAT UR: 13 MG/G CREAT
ALP SERPL-CCNC: 78 U/L (ref 37–153)
ALT SERPL-CCNC: 27 U/L (ref 6–29)
ANION GAP SERPL CALCULATED.4IONS-SCNC: 12 MMOL/L (CALC) (ref 7–17)
AST SERPL-CCNC: 22 U/L (ref 10–35)
BILIRUB SERPL-MCNC: 0.7 MG/DL (ref 0.2–1.2)
BUN SERPL-MCNC: 21 MG/DL (ref 7–25)
CALCIUM SERPL-MCNC: 9.7 MG/DL (ref 8.6–10.4)
CHLORIDE SERPL-SCNC: 101 MMOL/L (ref 98–110)
CO2 SERPL-SCNC: 26 MMOL/L (ref 20–32)
CREAT SERPL-MCNC: 1.22 MG/DL (ref 0.5–1.05)
CREAT UR-MCNC: 67 MG/DL (ref 20–275)
EGFRCR SERPLBLD CKD-EPI 2021: 48 ML/MIN/1.73M2
EST. AVERAGE GLUCOSE BLD GHB EST-MCNC: 163 MG/DL
EST. AVERAGE GLUCOSE BLD GHB EST-SCNC: 9 MMOL/L
GLUCOSE SERPL-MCNC: 97 MG/DL (ref 65–99)
HBA1C MFR BLD: 7.3 %
MICROALBUMIN UR-MCNC: 0.9 MG/DL
POTASSIUM SERPL-SCNC: 4 MMOL/L (ref 3.5–5.3)
PROT SERPL-MCNC: 7.2 G/DL (ref 6.1–8.1)
SODIUM SERPL-SCNC: 139 MMOL/L (ref 135–146)

## 2025-09-11 ENCOUNTER — APPOINTMENT (OUTPATIENT)
Dept: PRIMARY CARE | Facility: CLINIC | Age: 70
End: 2025-09-11
Payer: MEDICARE

## 2025-09-19 ENCOUNTER — APPOINTMENT (OUTPATIENT)
Dept: PHARMACY | Facility: HOSPITAL | Age: 70
End: 2025-09-19
Payer: MEDICARE

## 2025-10-16 ENCOUNTER — APPOINTMENT (OUTPATIENT)
Dept: DERMATOLOGY | Facility: CLINIC | Age: 70
End: 2025-10-16
Payer: MEDICARE

## 2025-11-06 ENCOUNTER — APPOINTMENT (OUTPATIENT)
Facility: CLINIC | Age: 70
End: 2025-11-06
Payer: MEDICARE